# Patient Record
Sex: FEMALE | Race: BLACK OR AFRICAN AMERICAN | NOT HISPANIC OR LATINO | Employment: FULL TIME | URBAN - METROPOLITAN AREA
[De-identification: names, ages, dates, MRNs, and addresses within clinical notes are randomized per-mention and may not be internally consistent; named-entity substitution may affect disease eponyms.]

---

## 2020-01-26 ENCOUNTER — HOSPITAL ENCOUNTER (INPATIENT)
Facility: HOSPITAL | Age: 55
LOS: 11 days | Discharge: HOME | DRG: 442 | End: 2020-02-06
Attending: HOSPITALIST | Admitting: HOSPITALIST
Payer: COMMERCIAL

## 2020-01-26 DIAGNOSIS — D64.9 NORMOCYTIC ANEMIA: ICD-10-CM

## 2020-01-26 DIAGNOSIS — R53.81 DEBILITY: ICD-10-CM

## 2020-01-26 DIAGNOSIS — R07.9 CHEST PAIN: ICD-10-CM

## 2020-01-26 DIAGNOSIS — R55 SYNCOPE AND COLLAPSE: ICD-10-CM

## 2020-01-26 DIAGNOSIS — E88.09 HYPOALBUMINEMIA: Primary | ICD-10-CM

## 2020-01-26 DIAGNOSIS — K72.00 ACUTE HEPATIC FAILURE: ICD-10-CM

## 2020-01-26 DIAGNOSIS — B18.2 CHRONIC HEPATITIS C WITHOUT HEPATIC COMA: ICD-10-CM

## 2020-01-26 DIAGNOSIS — S36.119A HEPATIC TRAUMA, INITIAL ENCOUNTER: ICD-10-CM

## 2020-01-26 PROCEDURE — 20600001 HC STEP DOWN PRIVATE ROOM

## 2020-01-26 RX ORDER — SODIUM CHLORIDE 0.9 % (FLUSH) 0.9 %
10 SYRINGE (ML) INJECTION
Status: DISCONTINUED | OUTPATIENT
Start: 2020-01-27 | End: 2020-02-06 | Stop reason: HOSPADM

## 2020-01-26 NOTE — LETTER
February 6, 2020         [address]  [facility] [address]  Phone: [phone]       Patient: Albania Richter   YOB: 1965  Date of Visit: 02/06/2020    To Whom It May Concern:    Albania Richter  was hospitalized at [facility] from 01/26/2020 to 02/06/2020 for management of acute liver injury. She may return to work on 02/24/2020 no restrictions. If you have any questions or concerns, or if I can be of further assistance, please do not hesitate to contact me.    Sincerely,        Abel Schmid MD

## 2020-01-26 NOTE — LETTER
February 6, 2020         [address]  [facility] [address]  Phone: [phone]       Patient: Albania Richter   YOB: 1965  Date of Visit: 02/06/2020    To Whom It May Concern:    Albania Richter was at [facility] from 01/26/2020 to 02/06/2020. She may return to work/school on 02/24/2020 with no restrictions. If you have any questions or concerns, or if I can be of further assistance, please do not hesitate to contact me.    Sincerely,        Abel Schmid MD

## 2020-01-26 NOTE — PLAN OF CARE
[facility] Transfer Acceptance Note    [place] [facility] [place] [address] -  Please call extension [phone] (if nobody answers, this will flip to a beeper, so put in your call back number) upon patient arrival to floor for Hospital Medicine admit team assignment and for additional admit orders for the patient.  Do not page the attending, staff physician associate with the patient on arrival (may not be in-house at the time of arrival).  Rather, always call [phone] to reach the triage physician for orders and team assignment.     Transferring Facility/Hospital: [facility]     Referring Provider/Specialty giving report: Dr. Lit Cortez    Accepting Physician for admission to hospital: Zelalem Dangelo MD    [place] Dest[place] & [place] [facility] [place] [address] -     Date of acceptance:  1/26/2020   2:49 PM    Patients name: Albania Richter     Allergies:Review of patient's allergies indicates:  Allergies not on file     Reason for transfer:  higher level of care,      Overview/ Report from Physician/Mid-Level Provider:    HPI:  55 Y/O with hx of fatty liver dz (dx 13 yrs ago), presented on 1/23 with abdominal pain and fvers/chills CT scan showed inflamed gallbladder and concerns for acalculous cholecystitis, surgery was consulted, on admit pt was afebrile, borderline Bp (90/60s) nromocardic, normal pulse ox, she had no leukocytosis, normal renal function, labs notable for T. Bili of 4.5 and transaminase values ast/alt of 1662/1831 with alk phos of 247.  Minaya sign was present on examination.     On 1/23 - Dr. Miguelito Honeycutt took aptient for lap corbin and intraoperative cholangiogram, cholangiogram described as entire biliary tree was visualized and there was rapid emptying into the small bowel, We then removed the cholangiogram catheter     Subsequent lab w/u on 1/25 noted pts HCV Ab was positive, blood cultures shown no antwon x 2 days,a nd urine culture with no significant growth,  on 1/24 pt  with some acute dyspnea checst pain, cardiac enzymes negative, pain started after IV acetylcysteine was administered due to hx of increased Tylenol intake prior to arrival (taking 8-500mg Tylenol daily for 4 days prior to admission, apap level 2.4 )  GI recommended acetylcysteine which has completed.     Conference call with Dr. Cortez and Dr. Rowe hepatology  -patient has had HIDA scan that has returned normal, no encephalopathy reported.   Current Inpatient meds are D5-Normal saline infusion, Lovenox Sq,  Protonix, DIlaudid PRN and Zofran PRN     No reported drug use     VS: Temp:  [99.7 °F (37.6 °C)]   Pulse:  [90]   Resp:  [20]   BP: (90)/(46)   SpO2:  [93 %]     Labs:  1/22 - T.bili 4.5   Ast/alt - 1662/1831     1/26     Cbc - 9.5/9.9/209     CMP  - na 142 k 3.6      Diagnostic Tests/Radiographs:   CT scan 1/23  -fatty enlarged liver, 19cm increased echogenicity , alarge amt of pericholecystic edema or fluid, no stones by ultrasound, correlate clinically for cholecystitis,     HIDA scan completed today - reported result as normal.     To Do List upon arrival:  Admit to Stepdown bed     1. Obtain HCV viral load RNA_Autoimmune markers - ANA_Anti-Sm muscle Ab, Anti-LKM, Anti-microsome  Check EBV, CMV, HSV 1-2 serum titers   Check IgG levels   Check Ammonia level  Check Type & Screen - obtain blood consent   Check ABG x 1 _Lactic acid   Check Blood cultures _UA/reflex culture and CXR     2. Trend CBC_PT/INR_CMP_Mg_Phos  Daily to q12hrs   -POCT Blood glucose monitoring     3. Hepatology consult            Zelalem Dangelo M.D.  Attending Physician  [facility] Medicine Dept.  Pager: [phone]

## 2020-01-27 PROBLEM — K76.82 HEPATIC ENCEPHALOPATHY: Status: ACTIVE | Noted: 2020-01-27

## 2020-01-27 PROBLEM — E83.39 HYPOPHOSPHATEMIA: Status: ACTIVE | Noted: 2020-01-27

## 2020-01-27 PROBLEM — E88.09 HYPOALBUMINEMIA: Status: ACTIVE | Noted: 2020-01-27

## 2020-01-27 PROBLEM — Z90.49 S/P LAPAROSCOPIC CHOLECYSTECTOMY: Status: ACTIVE | Noted: 2020-01-27

## 2020-01-27 PROBLEM — S36.119A LIVER INJURY: Status: ACTIVE | Noted: 2020-01-26

## 2020-01-27 PROBLEM — D64.9 NORMOCYTIC ANEMIA: Status: ACTIVE | Noted: 2020-01-27

## 2020-01-27 PROBLEM — B19.20 HEPATITIS C: Status: ACTIVE | Noted: 2020-01-27

## 2020-01-27 LAB
ALBUMIN SERPL BCP-MCNC: 2.5 G/DL (ref 3.5–5.2)
ALBUMIN SERPL BCP-MCNC: 2.7 G/DL (ref 3.5–5.2)
ALLENS TEST: ABNORMAL
ALP SERPL-CCNC: 193 U/L (ref 55–135)
ALP SERPL-CCNC: 200 U/L (ref 55–135)
ALT SERPL W/O P-5'-P-CCNC: 1401 U/L (ref 10–44)
ALT SERPL W/O P-5'-P-CCNC: 1451 U/L (ref 10–44)
AMMONIA PLAS-SCNC: 86 UMOL/L (ref 10–50)
ANION GAP SERPL CALC-SCNC: 13 MMOL/L (ref 8–16)
ANION GAP SERPL CALC-SCNC: 9 MMOL/L (ref 8–16)
APAP SERPL-MCNC: <3 UG/ML (ref 10–20)
APTT BLDCRRT: 26.5 SEC (ref 21–32)
AST SERPL-CCNC: 1437 U/L (ref 10–40)
AST SERPL-CCNC: 1476 U/L (ref 10–40)
BASOPHILS # BLD AUTO: 0.04 K/UL (ref 0–0.2)
BASOPHILS # BLD AUTO: 0.04 K/UL (ref 0–0.2)
BASOPHILS NFR BLD: 0.3 % (ref 0–1.9)
BASOPHILS NFR BLD: 0.4 % (ref 0–1.9)
BILIRUB DIRECT SERPL-MCNC: >14 MG/DL (ref 0.1–0.3)
BILIRUB SERPL-MCNC: 24.5 MG/DL (ref 0.1–1)
BILIRUB SERPL-MCNC: 24.8 MG/DL (ref 0.1–1)
BILIRUB UR QL STRIP: ABNORMAL
BUN SERPL-MCNC: 10 MG/DL (ref 6–20)
BUN SERPL-MCNC: 11 MG/DL (ref 6–20)
CALCIUM SERPL-MCNC: 8.7 MG/DL (ref 8.7–10.5)
CALCIUM SERPL-MCNC: 8.8 MG/DL (ref 8.7–10.5)
CERULOPLASMIN SERPL-MCNC: 39 MG/DL (ref 15–45)
CHLORIDE SERPL-SCNC: 105 MMOL/L (ref 95–110)
CHLORIDE SERPL-SCNC: 106 MMOL/L (ref 95–110)
CLARITY UR REFRACT.AUTO: CLEAR
CO2 SERPL-SCNC: 22 MMOL/L (ref 23–29)
CO2 SERPL-SCNC: 24 MMOL/L (ref 23–29)
COLOR UR AUTO: ABNORMAL
CREAT SERPL-MCNC: 0.8 MG/DL (ref 0.5–1.4)
CREAT SERPL-MCNC: 0.9 MG/DL (ref 0.5–1.4)
DIFFERENTIAL METHOD: ABNORMAL
DIFFERENTIAL METHOD: ABNORMAL
EOSINOPHIL # BLD AUTO: 0.3 K/UL (ref 0–0.5)
EOSINOPHIL # BLD AUTO: 0.3 K/UL (ref 0–0.5)
EOSINOPHIL NFR BLD: 2.4 % (ref 0–8)
EOSINOPHIL NFR BLD: 2.5 % (ref 0–8)
ERYTHROCYTE [DISTWIDTH] IN BLOOD BY AUTOMATED COUNT: 14.6 % (ref 11.5–14.5)
ERYTHROCYTE [DISTWIDTH] IN BLOOD BY AUTOMATED COUNT: 14.6 % (ref 11.5–14.5)
EST. GFR  (AFRICAN AMERICAN): >60 ML/MIN/1.73 M^2
EST. GFR  (AFRICAN AMERICAN): >60 ML/MIN/1.73 M^2
EST. GFR  (NON AFRICAN AMERICAN): >60 ML/MIN/1.73 M^2
EST. GFR  (NON AFRICAN AMERICAN): >60 ML/MIN/1.73 M^2
FERRITIN SERPL-MCNC: ABNORMAL NG/ML (ref 20–300)
GLUCOSE SERPL-MCNC: 100 MG/DL (ref 70–110)
GLUCOSE SERPL-MCNC: 102 MG/DL (ref 70–110)
GLUCOSE UR QL STRIP: NEGATIVE
HAV IGM SERPL QL IA: NEGATIVE
HBV CORE IGM SERPL QL IA: NEGATIVE
HBV SURFACE AG SERPL QL IA: NEGATIVE
HCO3 UR-SCNC: 24 MMOL/L (ref 24–28)
HCT VFR BLD AUTO: 31.9 % (ref 37–48.5)
HCT VFR BLD AUTO: 32.6 % (ref 37–48.5)
HCV AB SERPL QL IA: POSITIVE
HGB BLD-MCNC: 10 G/DL (ref 12–16)
HGB BLD-MCNC: 9.7 G/DL (ref 12–16)
HGB UR QL STRIP: NEGATIVE
HIV 1+2 AB+HIV1 P24 AG SERPL QL IA: NEGATIVE
IGG SERPL-MCNC: 1505 MG/DL (ref 650–1600)
IMM GRANULOCYTES # BLD AUTO: 0.16 K/UL (ref 0–0.04)
IMM GRANULOCYTES # BLD AUTO: 0.2 K/UL (ref 0–0.04)
IMM GRANULOCYTES NFR BLD AUTO: 1.5 % (ref 0–0.5)
IMM GRANULOCYTES NFR BLD AUTO: 1.7 % (ref 0–0.5)
INR PPP: 1.3 (ref 0.8–1.2)
INR PPP: 1.4 (ref 0.8–1.2)
KETONES UR QL STRIP: NEGATIVE
LACTATE SERPL-SCNC: 1.2 MMOL/L (ref 0.5–2.2)
LDH SERPL L TO P-CCNC: 2.31 MMOL/L (ref 0.36–1.25)
LEUKOCYTE ESTERASE UR QL STRIP: NEGATIVE
LYMPHOCYTES # BLD AUTO: 2.5 K/UL (ref 1–4.8)
LYMPHOCYTES # BLD AUTO: 2.7 K/UL (ref 1–4.8)
LYMPHOCYTES NFR BLD: 22.1 % (ref 18–48)
LYMPHOCYTES NFR BLD: 23 % (ref 18–48)
MAGNESIUM SERPL-MCNC: 2 MG/DL (ref 1.6–2.6)
MAGNESIUM SERPL-MCNC: 2 MG/DL (ref 1.6–2.6)
MCH RBC QN AUTO: 29.5 PG (ref 27–31)
MCH RBC QN AUTO: 29.7 PG (ref 27–31)
MCHC RBC AUTO-ENTMCNC: 30.4 G/DL (ref 32–36)
MCHC RBC AUTO-ENTMCNC: 30.7 G/DL (ref 32–36)
MCV RBC AUTO: 97 FL (ref 82–98)
MCV RBC AUTO: 97 FL (ref 82–98)
MONOCYTES # BLD AUTO: 1.3 K/UL (ref 0.3–1)
MONOCYTES # BLD AUTO: 1.4 K/UL (ref 0.3–1)
MONOCYTES NFR BLD: 11.7 % (ref 4–15)
MONOCYTES NFR BLD: 12.5 % (ref 4–15)
NEUTROPHILS # BLD AUTO: 6.4 K/UL (ref 1.8–7.7)
NEUTROPHILS # BLD AUTO: 7.4 K/UL (ref 1.8–7.7)
NEUTROPHILS NFR BLD: 60.1 % (ref 38–73)
NEUTROPHILS NFR BLD: 61.8 % (ref 38–73)
NITRITE UR QL STRIP: NEGATIVE
NRBC BLD-RTO: 0 /100 WBC
NRBC BLD-RTO: 0 /100 WBC
PCO2 BLDA: 31.9 MMHG (ref 35–45)
PH SMN: 7.48 [PH] (ref 7.35–7.45)
PH UR STRIP: 7 [PH] (ref 5–8)
PHOSPHATE SERPL-MCNC: 2 MG/DL (ref 2.7–4.5)
PHOSPHATE SERPL-MCNC: 2.4 MG/DL (ref 2.7–4.5)
PLATELET # BLD AUTO: 221 K/UL (ref 150–350)
PLATELET # BLD AUTO: 224 K/UL (ref 150–350)
PMV BLD AUTO: 13 FL (ref 9.2–12.9)
PMV BLD AUTO: 14 FL (ref 9.2–12.9)
PO2 BLDA: 88 MMHG (ref 80–100)
POC BE: 1 MMOL/L
POC SATURATED O2: 98 % (ref 95–100)
POC TCO2: 25 MMOL/L (ref 23–27)
POCT GLUCOSE: 138 MG/DL (ref 70–110)
POTASSIUM SERPL-SCNC: 3.5 MMOL/L (ref 3.5–5.1)
POTASSIUM SERPL-SCNC: 3.6 MMOL/L (ref 3.5–5.1)
PROT SERPL-MCNC: 6.2 G/DL (ref 6–8.4)
PROT SERPL-MCNC: 6.3 G/DL (ref 6–8.4)
PROT UR QL STRIP: NEGATIVE
PROTHROMBIN TIME: 13 SEC (ref 9–12.5)
PROTHROMBIN TIME: 13.4 SEC (ref 9–12.5)
RBC # BLD AUTO: 3.29 M/UL (ref 4–5.4)
RBC # BLD AUTO: 3.37 M/UL (ref 4–5.4)
SAMPLE: ABNORMAL
SITE: ABNORMAL
SODIUM SERPL-SCNC: 139 MMOL/L (ref 136–145)
SODIUM SERPL-SCNC: 140 MMOL/L (ref 136–145)
SP GR UR STRIP: 1.01 (ref 1–1.03)
URN SPEC COLLECT METH UR: ABNORMAL
WBC # BLD AUTO: 10.63 K/UL (ref 3.9–12.7)
WBC # BLD AUTO: 11.97 K/UL (ref 3.9–12.7)

## 2020-01-27 PROCEDURE — 85610 PROTHROMBIN TIME: CPT

## 2020-01-27 PROCEDURE — 87040 BLOOD CULTURE FOR BACTERIA: CPT

## 2020-01-27 PROCEDURE — 99223 1ST HOSP IP/OBS HIGH 75: CPT | Mod: ,,, | Performed by: HOSPITALIST

## 2020-01-27 PROCEDURE — 81003 URINALYSIS AUTO W/O SCOPE: CPT

## 2020-01-27 PROCEDURE — 87529 HSV DNA AMP PROBE: CPT | Mod: 91

## 2020-01-27 PROCEDURE — 86038 ANTINUCLEAR ANTIBODIES: CPT

## 2020-01-27 PROCEDURE — 87522 HEPATITIS C REVRS TRNSCRPJ: CPT

## 2020-01-27 PROCEDURE — 82784 ASSAY IGA/IGD/IGG/IGM EACH: CPT

## 2020-01-27 PROCEDURE — 84100 ASSAY OF PHOSPHORUS: CPT

## 2020-01-27 PROCEDURE — 80329 ANALGESICS NON-OPIOID 1 OR 2: CPT

## 2020-01-27 PROCEDURE — 80053 COMPREHEN METABOLIC PANEL: CPT

## 2020-01-27 PROCEDURE — 86706 HEP B SURFACE ANTIBODY: CPT

## 2020-01-27 PROCEDURE — 82140 ASSAY OF AMMONIA: CPT

## 2020-01-27 PROCEDURE — 36600 WITHDRAWAL OF ARTERIAL BLOOD: CPT

## 2020-01-27 PROCEDURE — 82248 BILIRUBIN DIRECT: CPT

## 2020-01-27 PROCEDURE — 20600001 HC STEP DOWN PRIVATE ROOM

## 2020-01-27 PROCEDURE — 36415 COLL VENOUS BLD VENIPUNCTURE: CPT

## 2020-01-27 PROCEDURE — 86235 NUCLEAR ANTIGEN ANTIBODY: CPT

## 2020-01-27 PROCEDURE — 83735 ASSAY OF MAGNESIUM: CPT

## 2020-01-27 PROCEDURE — 80321 ALCOHOLS BIOMARKERS 1OR 2: CPT

## 2020-01-27 PROCEDURE — 83605 ASSAY OF LACTIC ACID: CPT

## 2020-01-27 PROCEDURE — 99223 PR INITIAL HOSPITAL CARE,LEVL III: ICD-10-PCS | Mod: ,,, | Performed by: INTERNAL MEDICINE

## 2020-01-27 PROCEDURE — 86703 HIV-1/HIV-2 1 RESULT ANTBDY: CPT

## 2020-01-27 PROCEDURE — 83735 ASSAY OF MAGNESIUM: CPT | Mod: 91

## 2020-01-27 PROCEDURE — 82728 ASSAY OF FERRITIN: CPT

## 2020-01-27 PROCEDURE — 87799 DETECT AGENT NOS DNA QUANT: CPT

## 2020-01-27 PROCEDURE — 99223 1ST HOSP IP/OBS HIGH 75: CPT | Mod: ,,, | Performed by: INTERNAL MEDICINE

## 2020-01-27 PROCEDURE — 80053 COMPREHEN METABOLIC PANEL: CPT | Mod: 91

## 2020-01-27 PROCEDURE — 93010 EKG 12-LEAD: ICD-10-PCS | Mod: ,,, | Performed by: INTERNAL MEDICINE

## 2020-01-27 PROCEDURE — 85730 THROMBOPLASTIN TIME PARTIAL: CPT

## 2020-01-27 PROCEDURE — 82390 ASSAY OF CERULOPLASMIN: CPT

## 2020-01-27 PROCEDURE — 25000003 PHARM REV CODE 250: Performed by: STUDENT IN AN ORGANIZED HEALTH CARE EDUCATION/TRAINING PROGRAM

## 2020-01-27 PROCEDURE — 63600175 PHARM REV CODE 636 W HCPCS: Performed by: STUDENT IN AN ORGANIZED HEALTH CARE EDUCATION/TRAINING PROGRAM

## 2020-01-27 PROCEDURE — 85610 PROTHROMBIN TIME: CPT | Mod: 91

## 2020-01-27 PROCEDURE — 27000221 HC OXYGEN, UP TO 24 HOURS

## 2020-01-27 PROCEDURE — 86704 HEP B CORE ANTIBODY TOTAL: CPT

## 2020-01-27 PROCEDURE — 93010 ELECTROCARDIOGRAM REPORT: CPT | Mod: ,,, | Performed by: INTERNAL MEDICINE

## 2020-01-27 PROCEDURE — 86376 MICROSOMAL ANTIBODY EACH: CPT

## 2020-01-27 PROCEDURE — 80074 ACUTE HEPATITIS PANEL: CPT

## 2020-01-27 PROCEDURE — 84100 ASSAY OF PHOSPHORUS: CPT | Mod: 91

## 2020-01-27 PROCEDURE — 86256 FLUORESCENT ANTIBODY TITER: CPT | Mod: 91

## 2020-01-27 PROCEDURE — 94761 N-INVAS EAR/PLS OXIMETRY MLT: CPT

## 2020-01-27 PROCEDURE — 99900035 HC TECH TIME PER 15 MIN (STAT)

## 2020-01-27 PROCEDURE — 82803 BLOOD GASES ANY COMBINATION: CPT

## 2020-01-27 PROCEDURE — 85025 COMPLETE CBC W/AUTO DIFF WBC: CPT

## 2020-01-27 PROCEDURE — 86803 HEPATITIS C AB TEST: CPT

## 2020-01-27 PROCEDURE — 99223 PR INITIAL HOSPITAL CARE,LEVL III: ICD-10-PCS | Mod: ,,, | Performed by: HOSPITALIST

## 2020-01-27 PROCEDURE — 93005 ELECTROCARDIOGRAM TRACING: CPT

## 2020-01-27 RX ORDER — IBUPROFEN 600 MG/1
600 TABLET ORAL EVERY 8 HOURS PRN
Status: ON HOLD | COMMUNITY
End: 2020-01-30 | Stop reason: HOSPADM

## 2020-01-27 RX ORDER — IBUPROFEN 200 MG
400 TABLET ORAL EVERY 6 HOURS PRN
Status: DISCONTINUED | OUTPATIENT
Start: 2020-01-27 | End: 2020-01-27

## 2020-01-27 RX ORDER — HYDROMORPHONE HYDROCHLORIDE 1 MG/ML
0.2 INJECTION, SOLUTION INTRAMUSCULAR; INTRAVENOUS; SUBCUTANEOUS ONCE
Status: COMPLETED | OUTPATIENT
Start: 2020-01-27 | End: 2020-01-27

## 2020-01-27 RX ORDER — KETOROLAC TROMETHAMINE 15 MG/ML
15 INJECTION, SOLUTION INTRAMUSCULAR; INTRAVENOUS ONCE
Status: DISCONTINUED | OUTPATIENT
Start: 2020-01-27 | End: 2020-01-27

## 2020-01-27 RX ORDER — ENOXAPARIN SODIUM 100 MG/ML
40 INJECTION SUBCUTANEOUS EVERY 24 HOURS
Status: DISCONTINUED | OUTPATIENT
Start: 2020-01-27 | End: 2020-02-06 | Stop reason: HOSPADM

## 2020-01-27 RX ORDER — SODIUM CHLORIDE, SODIUM LACTATE, POTASSIUM CHLORIDE, CALCIUM CHLORIDE 600; 310; 30; 20 MG/100ML; MG/100ML; MG/100ML; MG/100ML
INJECTION, SOLUTION INTRAVENOUS CONTINUOUS
Status: ACTIVE | OUTPATIENT
Start: 2020-01-27 | End: 2020-01-28

## 2020-01-27 RX ORDER — ACETAMINOPHEN 500 MG
500 TABLET ORAL EVERY 6 HOURS PRN
Status: ON HOLD | COMMUNITY
End: 2020-01-30 | Stop reason: HOSPADM

## 2020-01-27 RX ORDER — ONDANSETRON 4 MG/1
4 TABLET, ORALLY DISINTEGRATING ORAL EVERY 6 HOURS PRN
Status: DISCONTINUED | OUTPATIENT
Start: 2020-01-27 | End: 2020-01-29

## 2020-01-27 RX ORDER — SODIUM CHLORIDE, SODIUM LACTATE, POTASSIUM CHLORIDE, CALCIUM CHLORIDE 600; 310; 30; 20 MG/100ML; MG/100ML; MG/100ML; MG/100ML
INJECTION, SOLUTION INTRAVENOUS CONTINUOUS
Status: ACTIVE | OUTPATIENT
Start: 2020-01-27 | End: 2020-01-27

## 2020-01-27 RX ORDER — KETOROLAC TROMETHAMINE 10 MG/1
10 TABLET, FILM COATED ORAL EVERY 6 HOURS PRN
Status: DISPENSED | OUTPATIENT
Start: 2020-01-27 | End: 2020-01-30

## 2020-01-27 RX ORDER — LACTULOSE 10 G/15ML
20 SOLUTION ORAL 3 TIMES DAILY
Status: DISCONTINUED | OUTPATIENT
Start: 2020-01-27 | End: 2020-01-29

## 2020-01-27 RX ORDER — HYDROMORPHONE HYDROCHLORIDE 1 MG/ML
0.2 INJECTION, SOLUTION INTRAMUSCULAR; INTRAVENOUS; SUBCUTANEOUS ONCE
Status: DISCONTINUED | OUTPATIENT
Start: 2020-01-27 | End: 2020-01-27

## 2020-01-27 RX ADMIN — ONDANSETRON 4 MG: 4 TABLET, ORALLY DISINTEGRATING ORAL at 03:01

## 2020-01-27 RX ADMIN — SODIUM CHLORIDE, SODIUM LACTATE, POTASSIUM CHLORIDE, AND CALCIUM CHLORIDE: .6; .31; .03; .02 INJECTION, SOLUTION INTRAVENOUS at 06:01

## 2020-01-27 RX ADMIN — LACTULOSE 20 G: 20 SOLUTION ORAL at 11:01

## 2020-01-27 RX ADMIN — ACETYLCYSTEINE 6.25 MG/KG/HR: 200 INJECTION, SOLUTION INTRAVENOUS at 09:01

## 2020-01-27 RX ADMIN — KETOROLAC TROMETHAMINE 10 MG: 10 TABLET, FILM COATED ORAL at 08:01

## 2020-01-27 RX ADMIN — ENOXAPARIN SODIUM 40 MG: 100 INJECTION SUBCUTANEOUS at 04:01

## 2020-01-27 RX ADMIN — LACTULOSE 20 G: 20 SOLUTION ORAL at 04:01

## 2020-01-27 RX ADMIN — SODIUM CHLORIDE, SODIUM LACTATE, POTASSIUM CHLORIDE, AND CALCIUM CHLORIDE: .6; .31; .03; .02 INJECTION, SOLUTION INTRAVENOUS at 05:01

## 2020-01-27 RX ADMIN — CEFTRIAXONE 2 G: 2 INJECTION, SOLUTION INTRAVENOUS at 01:01

## 2020-01-27 RX ADMIN — PHYTONADIONE 10 MG: 10 INJECTION, EMULSION INTRAMUSCULAR; INTRAVENOUS; SUBCUTANEOUS at 11:01

## 2020-01-27 RX ADMIN — ACETYLCYSTEINE 6.25 MG/KG/HR: 200 INJECTION, SOLUTION INTRAVENOUS at 01:01

## 2020-01-27 RX ADMIN — HYDROMORPHONE HYDROCHLORIDE 0.2 MG: 1 INJECTION, SOLUTION INTRAMUSCULAR; INTRAVENOUS; SUBCUTANEOUS at 01:01

## 2020-01-27 NOTE — PLAN OF CARE
"VSS. A/Ox4.  C/o abdominal discomfort and "some nausea".  PRN zofran given with full relief, patient sleeping upon one hour follow up.  Acetadote infusing at 25 mL/hr due to pump not having a continuous infusion option, charge nurse made aware and also attempted to fix.  Patient lethargic and with delayed responses.  Family at bedside.  Patient has been NPO for liver U/S that is scheduled for first thing in the AM.  No acute events overnight. Safety maintained.  All questions answered. Patient and family updated on plan of care.  Will continue to monitor.    "

## 2020-01-27 NOTE — PHARMACY MED REC
"Admission Medication Reconciliation - Pharmacy Consult Note    The home medication history was taken by Patrizia Dougherty.  Based on information gathered and subsequent review by the clinical pharmacist, the items below may need attention.    You may go to "Admission" then "Reconcile Home Medications" tabs to review and/or act upon these items.        No issues noted with the medication reconciliation.        Please address this information as you see fit.  Feel free to contact us if you have any questions or require assistance.    Berto Matias, PharmD  [id number]                .    .          "

## 2020-01-27 NOTE — ASSESSMENT & PLAN NOTE
Ms Richter is a 54 year old female with history of ?fatty liver disease, who is presenting from OSH due to concern for acute liver injury.    Unclear etiology of patient's acute liver injury.  Not encephalopathic.  No evidence of underlying advanced liver disease though unclear if history of underlying fatty liver. Given preceding symptoms abdominal pain, nausea vomiting diarrhea concern for while illness however initial viral workup at outside hospital was negative with exception of hep C antibody positive (pending viral titer though this is unlikely acute viral hep C).  Differential otherwise includes infectious, drug, vascular, ischemic, autoimmune.  Not currently hypotensive though on admission to outside hospital was noted to be hypotensive with systolic in 90s.  No personal history of autoimmune disorder and IgG is within limits.  Right upper quadrant negative for vascular injury.    Plan  - continue NAC gtt.  - repeat viral studies, CMV EBV HSV pending  - autoimmune serologies pending  - denies history of EtOH use and would not be c/w current presentation.  PETH pending  - mental status currently alert and oriented.  Monitor closely.  No narcotics or sedatives.  No lactulose or rifaximin. CT head for acute changes in mentation  - if initial workup is negative will plan for biopsy tomorrow  - HCV positive.  PCR pending.  - coagulopathy.  Trial of vitamin K 10 mg IV x3 days  - blood cultures, infectious workup, empiric ceftriaxone  - Trend CMP, CBC, INR b.i.d.  - call fellow with any change in clinical status  - transplant:  Will follow clinically and determine if evaluation is indicated

## 2020-01-27 NOTE — ASSESSMENT & PLAN NOTE
Found to be Hep C at OSH. Denies illicit drug use.    Plan:  -will obtain Hep C RNA  -Hepatology consulted

## 2020-01-27 NOTE — H&P
[facility] Hospital Medicine  History & Physical    Patient Name: Albania Richter  MRN: [id number]  Admission Date: 1/26/2020  Attending Physician: Zheng Thompson MD   Primary Care Provider: Primary [facility] Medicine Team: [facility] 4 Jaquelin Dunn MD     Patient information was obtained from patient and ER records.     Subjective:     Principal Problem:Acute hepatic failure    Chief Complaint:   Chief Complaint   Patient presents with    Abdominal Pain    Fatigue        HPI: Albania Richter is a 54 year old female with history of fatty liver disease who presents from [facility] for acute liver injury and hepatology evaluation.    She initially presented to the OSH after 1 week of abdominal pain and increased fatigue. She denies fever or chills, nausea or vomiting. She denies confusion. Her only medications ibuprofen and extra strength tylenol (x 2 q4hrs) which she states she only took occasionally for headache. She denies smoking, alcohol, and illicit drug use. She does not have a family history of liver disease or autoimmune disease.     At the OSH, she was BP 90/60's and afebrile on presentation. She had no leukocytosis. Labs were significant for Tbili 4.5, AST/ALT 1662/1831, and alk phos 247. CT scan showed inflamed gallbladder and concerns for acalculous cholecystitis. Surgery was consulted and performed Lap cholecystomy the following day. Intraoperative cholangiogram was negative. HIDA later found to be negative. Subsequent labs on 1/25 were significant for HCV Ab positive. On 1/24, she was started on IV acetylcysteine at the recommendation of GI due to a history of increased Tylenol intake prior to arrival (taking 8-500mg Tylenol daily for 4 days prior to admission, apap level 2.4) which was noted to be complete per transfer note.  .    [place] [facility] [place] further Hepatology evaluation.    Past Medical History:   Diagnosis Date    Fatty  liver disease, nonalcoholic        Past Surgical History:   Procedure Laterality Date     x3         Review of patient's allergies indicates:  No Known Allergies    No current facility-administered medications on file prior to encounter.      Current Outpatient Medications on File Prior to Encounter   Medication Sig    acetaminophen (TYLENOL EXTRA STRENGTH) 500 MG tablet Take 500 mg by mouth every 6 (six) hours as needed for Pain.    ibuprofen (ADVIL,MOTRIN) 600 MG tablet Take 600 mg by mouth every 8 (eight) hours as needed for Pain.     Family History     None        Tobacco Use    Smoking status: Not on file   Substance and Sexual Activity    Alcohol use: Not on file    Drug use: Not on file    Sexual activity: Not on file     Review of Systems   Constitutional: Negative for chills, fatigue and fever.   HENT: Negative for hearing loss, sore throat and trouble swallowing.    Eyes: Negative for visual disturbance.   Respiratory: Negative for cough and shortness of breath.    Cardiovascular: Negative for chest pain.   Gastrointestinal: Positive for abdominal distention. Negative for abdominal pain, constipation, diarrhea, nausea and vomiting.   Genitourinary: Negative for dysuria and frequency.   Musculoskeletal: Negative for arthralgias and myalgias.   Skin: Negative for rash.   Neurological: Negative for dizziness, weakness and headaches.     Objective:     Vital Signs (Most Recent):  Temp: 99.5 °F (37.5 °C) (20)  Pulse: 83 (20)  Resp: 18 (20)  BP: (!) 96/54 (20)  SpO2: 98 % (20) Vital Signs (24h Range):  Temp:  [99.5 °F (37.5 °C)-100 °F (37.8 °C)] 99.5 °F (37.5 °C)  Pulse:  [83-91] 83  Resp:  [18-20] 18  SpO2:  [93 %-98 %] 98 %  BP: (90-99)/(46-54) 96/54     Weight: 95.7 kg (210 lb 15.7 oz)  Body mass index is 34.05 kg/m².    Physical Exam   Constitutional: No distress.   Tired appearing    HENT:   Head: Normocephalic and atraumatic.   Right  Ear: External ear normal.   Left Ear: External ear normal.   Nose: Nose normal.   Eyes: Right eye exhibits no discharge. Left eye exhibits no discharge. Scleral icterus is present.   Neck: Neck supple.   Cardiovascular: Normal rate, regular rhythm and normal heart sounds. Exam reveals no gallop and no friction rub.   No murmur heard.  Pulmonary/Chest: Effort normal and breath sounds normal. No stridor. No respiratory distress. She has no wheezes. She has no rales.   Abdominal: Soft. Bowel sounds are normal. She exhibits no distension. There is tenderness. There is no rebound and no guarding.   Diffuse tenderness  Healing surgical scar without erythema or drainage   Musculoskeletal: She exhibits no edema.   Neurological: She is alert.   Oriented x 3, no astericks on exam   Skin: Skin is warm and dry. She is not diaphoretic.   Psychiatric: She has a normal mood and affect. Her behavior is normal. Thought content normal.   Nursing note and vitals reviewed.          Significant Labs:   CBC:   Recent Labs   Lab 01/27/20  0013   WBC 10.63   HGB 10.0*   HCT 32.6*        CMP:   Recent Labs   Lab 01/27/20  0013      K 3.5      CO2 24      BUN 10   CREATININE 0.9   CALCIUM 8.7   PROT 6.3   ALBUMIN 2.7*   ALKPHOS 200*   AST 1,476*   ALT 1,451*   ANIONGAP 9   EGFRNONAA >60.0         Assessment/Plan:     * Acute Liver Injury  55 yo Female with history of fatty liver disease presents with acute liver injury at OSH. Denies alcohol and IV drug use. Reported recent extra strength tylenol use prior to admission and found to be Hep C positive on labs. Received NAC at OSH. DDX include acute injury from Hepatitis C vs  DILI from tylenol vs autoimmune.    Plan:  -will obtain acute hep panel, Hep C RNA, ferritin, ABDI, Anti-smooth muscle, ceruloplasmin, CMV PCR, EBV PCR, HSV PCR, HIV, ammonia, IgG  -will continue NAC for now   -Hepatology consulted, appreciate recs  -blood cultures pending    Hepatitis C  Found to  be Hep C at OSH. Denies illicit drug use.    Plan:  -will obtain Hep C RNA  -Hepatology consulted      S/P laparoscopic cholecystectomy  S/p laparoscopic cholecystomy at OSH on 1/24     VTE Risk Mitigation (From admission, onward)         Ordered     IP VTE HIGH RISK PATIENT  Once      01/26/20 2351     Reason for No Pharmacological VTE Prophylaxis  Once     Question:  Reasons:  Answer:  Risk of Bleeding    01/26/20 2351                   Jaquelin Dunn MD  Department of Hospital Medicine   [facility]

## 2020-01-27 NOTE — CODE/ RAPID DOCUMENTATION
"RAPID RESPONSE NURSE NOTE     Admit Date: 2020  LOS: 1  Code Status: Full Code   Date of Consult: 2020  : 1965  Age: 54 y.o.  Weight:   Wt Readings from Last 1 Encounters:   20 95.7 kg (210 lb 15.7 oz)     Sex: female  Race: Black or    Bed: 8083/80 A:   MRN: [id number]  Time Rapid Response Team page Received: 1157  Time Rapid Response Team at Bedside: 1159  Time Rapid Response Team left Bedside: 1225  Was the patient discharged from an ICU this admission?   no  Was the patient discharged from a PACU within last 24 hours?  no  Did the patient receive conscious sedation/general anesthesia within last 24 hours?  no  Was the patient in the ED within the past 24 hours?  no  Was the patient started on NIPPV within the past 24 hours?  no  Did this progress into an ARC or CPA:  no  Attending Physician: Zheng Thompson MD  Primary Service: [facility] 4  Consult Requested By: Zheng Thompson MD     SITUATION     Reason for Call: syncopal episode  Called to evaluate the patient for Neuro    BACKGROUND     Why is the patient in the hospital?: Liver injury    Patient has a past medical history of Fatty liver disease, nonalcoholic, Hepatitis C, and S/P laparoscopic cholecystectomy.    ASSESSMENT/INTERVENTIONS     /70   Pulse 84   Temp 98.5 °F (36.9 °C) (Axillary)   Resp 18   Ht 5' 6" (1.676 m)   Wt 95.7 kg (210 lb 15.7 oz)   SpO2 96%   Breastfeeding? No   BMI 34.05 kg/m²     What did you find: Per bedside RN, pt was in bathroom, vomited, and experienced a syncopal episode. Upon return to bed, pt back to baseline. VSS. Pt sitting in bed lethargic, oriented x 4, and following commands. No respiratory distress noted. EKG completed. Primary team notified by bedside RN of syncopal episode.       RECOMMENDATIONS     We recommend: Fall precautions, closely monitor vital signs and neurological exam.     FOLLOW-UP/CONTINGENCY PLAN       Call the Rapid Response Nurse, " Michelle Briones RN at x [phone] for additional questions or concerns.    PHYSICIAN ESCALATION       Disposition: Remain in room 8083.

## 2020-01-27 NOTE — PLAN OF CARE
01/27/20 1431   Discharge Assessment   Confirmed/corrected address and phone number on facesheet? Yes   Assessment information obtained from? Patient;Caregiver  (Son)   Expected Length of Stay (days) 3   Communicated expected length of stay with patient/caregiver yes   Prior to hospitilization cognitive status: Alert/Oriented   Prior to hospitalization functional status: Independent   Current cognitive status: Alert/Oriented   Current Functional Status: Independent   Lives With parent(s)   Able to Return to Prior Arrangements yes   Is patient able to care for self after discharge? Unable to determine at this time (comments)   Who are your caregiver(s) and their phone number(s)? Pelon Kearns son [phone]   Patient's perception of discharge disposition home or selfcare   Readmission Within the Last 30 Days no previous admission in last 30 days   Patient currently being followed by outpatient case management? Unable to determine (comments)   Patient currently receives any other outside agency services? No   Equipment Currently Used at Home none   Do you have any problems affording any of your prescribed medications? No   Is the patient taking medications as prescribed? yes   Does the patient have transportation home? Yes   Transportation Anticipated family or friend will provide   Dialysis Name and Scheduled days n/a   Does the patient receive services at the Coumadin Clinic? No   Discharge Plan A Home with family   Discharge Plan B Home Health;Home with family   DME Needed Upon Discharge  none

## 2020-01-27 NOTE — HPI
Ms Richter is a 54 year old female with history of ?fatty liver disease, who is presenting from OSH due to concern for acute liver injury.    History obtained from discussion with patient, daughter and chart review.    Her daughter reports that she was well at baseline until approximately 2 weeks ago when she began to develop abdominal pain (diffuse, non-radiating), fatigue, weakness, nausea, vomitus and diarrhea (non-bloody).  Due to worsening symptoms she presented outside hospital for evaluation.  On presentation on 01/22 labs are notable for bilirubin 4.5, AST 1.6k, ALT 1.8k, , no INR available.  Abdominal imaging was concerning for acute cholecystitis with gallbladder wall thickening and pericholecystic fluid therefore underwent lap corbin on 01/23 with negative IOC.  Despite cholecystectomy her labs continue to worsen including a bilirubin which prateek from 4.6 on 01/23 to 10.201/25.  LFTs fluctuating initially improving to AST 1.0 K, ALT 1.3k on 1/25. INR was 1.3 on 1/25.  GI workup included viral hep panel which was notable for positive hep C antibody.  Due worsening liver function was started on NAC and transferred for further evaluation.  Verbally had HIDA scan outside hospital which was negative (report not available).  On admission labs are notable for INR 1.4, T bili 24.8, AST 1.4 K, ALT 1.4 K, ammonia 86.    For history patient reports that she is feeling well currently with exception of fatigue. She reports she she has some persistent abdominal pain predominantly right upper quadrant.  Denies nausea or vomiting. Feels that her symptoms did not improve after cholecystectomy at all.  Denies other history except as above.    She denies any prior history of liver disease.  Denies any family history of liver disease. Denies any knowledge of hep C contraction or prior history of jaundice or hepatitis.    Social History  - Herbal / OTC / Online medications: denies  - APAP: 8 extra-strength tab/day x 4  days prior to admission  - ETOH: none  - Prior ETOH-related hospitalizations: n/a  - Incarcerations/Law issues due to ETOH: n/a  - Drug use: denies  - Smoking: denies  - Occupation: works as  and sitter at [employer]  - Live with: , daughter and grand-daughter  - Animals: none  - Functional status: fully autonomous prior to admission    Past Surgical History  - , cholecystectomy    Family History  - no family history of liver disease

## 2020-01-27 NOTE — SUBJECTIVE & OBJECTIVE
Review of Systems   Constitutional: Positive for fatigue. Negative for activity change, appetite change, chills, diaphoresis, fever and unexpected weight change.   HENT: Negative for sore throat and trouble swallowing.    Eyes: Negative for visual disturbance.   Respiratory: Negative for chest tightness and shortness of breath.    Cardiovascular: Negative for chest pain and leg swelling.   Gastrointestinal: Negative for abdominal distention, abdominal pain, anal bleeding, blood in stool, constipation, diarrhea, nausea and vomiting.   Genitourinary: Negative for dysuria and hematuria.   Musculoskeletal: Negative for arthralgias and myalgias.   Skin: Negative for rash.   Neurological: Negative for dizziness, weakness, light-headedness and headaches.   Psychiatric/Behavioral: Negative for agitation and confusion.       Past Medical History:   Diagnosis Date    Fatty liver disease, nonalcoholic     Hepatitis C 2020    S/P laparoscopic cholecystectomy 2020       Past Surgical History:   Procedure Laterality Date     x3         Family history of liver disease: No    Review of patient's allergies indicates:  No Known Allergies    Tobacco Use    Smoking status: Never Smoker    Smokeless tobacco: Never Used   Substance and Sexual Activity    Alcohol use: Not on file    Drug use: Not on file    Sexual activity: Not on file       Medications Prior to Admission   Medication Sig Dispense Refill Last Dose    acetaminophen (TYLENOL EXTRA STRENGTH) 500 MG tablet Take 500 mg by mouth every 6 (six) hours as needed for Pain.       ibuprofen (ADVIL,MOTRIN) 600 MG tablet Take 600 mg by mouth every 8 (eight) hours as needed for Pain.          Objective:     Vital Signs (Most Recent):  Temp: 98.5 °F (36.9 °C) (20 0719)  Pulse: 84 (20 1218)  Resp: 18 (20 1218)  BP: 128/70 (20 1201)  SpO2: 96 % (20 1218) Vital Signs (24h Range):  Temp:  [98.3 °F (36.8 °C)-100 °F (37.8 °C)] 98.5 °F  (36.9 °C)  Pulse:  [81-97] 84  Resp:  [18-20] 18  SpO2:  [91 %-98 %] 96 %  BP: ()/(53-70) 128/70     Weight: 95.7 kg (210 lb 15.7 oz) (01/26/20 2300)  Body mass index is 34.05 kg/m².    Physical Exam   Constitutional: She is oriented to person, place, and time. She appears well-developed and well-nourished. No distress.   Appears tired but awake alert and answering questions appropriately.  No confusion.  Multiple family members at bedside.   HENT:   Head: Normocephalic and atraumatic.   Mouth/Throat: Oropharynx is clear and moist. No oropharyngeal exudate.   Eyes: Conjunctivae are normal. Scleral icterus is present.   Cardiovascular: Normal rate, regular rhythm, normal heart sounds and intact distal pulses.   Pulmonary/Chest: Effort normal and breath sounds normal. No respiratory distress.   Abdominal: Soft. Bowel sounds are normal. She exhibits no distension and no mass. There is no tenderness. There is no rebound and no guarding.   Soft, mild right upper quadrant tenderness.  Healing labp incisions.   Musculoskeletal: She exhibits edema. She exhibits no tenderness.   Lymphadenopathy:     She has no cervical adenopathy.   Neurological: She is alert and oriented to person, place, and time.   No asterixis.   Skin: Skin is warm. Capillary refill takes less than 2 seconds. No rash noted. She is not diaphoretic.   Psychiatric: She has a normal mood and affect. Her behavior is normal. Judgment and thought content normal.   Nursing note and vitals reviewed.      MELD-Na score: 22 at 1/27/2020  3:09 AM  MELD score: 22 at 1/27/2020  3:09 AM  Calculated from:  Serum Creatinine: 0.8 mg/dL (Rounded to 1 mg/dL) at 1/27/2020  3:09 AM  Serum Sodium: 140 mmol/L (Rounded to 137 mmol/L) at 1/27/2020  3:09 AM  Total Bilirubin: 24.8 mg/dL at 1/27/2020  3:09 AM  INR(ratio): 1.4 at 1/27/2020  3:09 AM  Age: 54 years    Significant Labs:  Labs within the past month have been reviewed.    Significant Imaging:  Labs: Reviewed

## 2020-01-27 NOTE — SUBJECTIVE & OBJECTIVE
Past Medical History:   Diagnosis Date    Fatty liver disease, nonalcoholic        Past Surgical History:   Procedure Laterality Date     x3         Review of patient's allergies indicates:  No Known Allergies    No current facility-administered medications on file prior to encounter.      Current Outpatient Medications on File Prior to Encounter   Medication Sig    acetaminophen (TYLENOL EXTRA STRENGTH) 500 MG tablet Take 500 mg by mouth every 6 (six) hours as needed for Pain.    ibuprofen (ADVIL,MOTRIN) 600 MG tablet Take 600 mg by mouth every 8 (eight) hours as needed for Pain.     Family History     None        Tobacco Use    Smoking status: Not on file   Substance and Sexual Activity    Alcohol use: Not on file    Drug use: Not on file    Sexual activity: Not on file     Review of Systems   Constitutional: Negative for chills, fatigue and fever.   HENT: Negative for hearing loss, sore throat and trouble swallowing.    Eyes: Negative for visual disturbance.   Respiratory: Negative for cough and shortness of breath.    Cardiovascular: Negative for chest pain.   Gastrointestinal: Positive for abdominal distention. Negative for abdominal pain, constipation, diarrhea, nausea and vomiting.   Genitourinary: Negative for dysuria and frequency.   Musculoskeletal: Negative for arthralgias and myalgias.   Skin: Negative for rash.   Neurological: Negative for dizziness, weakness and headaches.     Objective:     Vital Signs (Most Recent):  Temp: 99.5 °F (37.5 °C) (20)  Pulse: 83 (20)  Resp: 18 (20)  BP: (!) 96/54 (20)  SpO2: 98 % (20) Vital Signs (24h Range):  Temp:  [99.5 °F (37.5 °C)-100 °F (37.8 °C)] 99.5 °F (37.5 °C)  Pulse:  [83-91] 83  Resp:  [18-20] 18  SpO2:  [93 %-98 %] 98 %  BP: (90-99)/(46-54) 96/54     Weight: 95.7 kg (210 lb 15.7 oz)  Body mass index is 34.05 kg/m².    Physical Exam   Constitutional: No distress.   Tired appearing    HENT:    Head: Normocephalic and atraumatic.   Right Ear: External ear normal.   Left Ear: External ear normal.   Nose: Nose normal.   Eyes: Right eye exhibits no discharge. Left eye exhibits no discharge. Scleral icterus is present.   Neck: Neck supple.   Cardiovascular: Normal rate, regular rhythm and normal heart sounds. Exam reveals no gallop and no friction rub.   No murmur heard.  Pulmonary/Chest: Effort normal and breath sounds normal. No stridor. No respiratory distress. She has no wheezes. She has no rales.   Abdominal: Soft. Bowel sounds are normal. She exhibits no distension. There is tenderness. There is no rebound and no guarding.   Diffuse tenderness  Healing surgical scar without erythema or drainage   Musculoskeletal: She exhibits no edema.   Neurological: She is alert.   Oriented x 3, no astericks on exam   Skin: Skin is warm and dry. She is not diaphoretic.   Psychiatric: She has a normal mood and affect. Her behavior is normal. Thought content normal.   Nursing note and vitals reviewed.          Significant Labs:   CBC:   Recent Labs   Lab 01/27/20  0013   WBC 10.63   HGB 10.0*   HCT 32.6*        CMP:   Recent Labs   Lab 01/27/20  0013      K 3.5      CO2 24      BUN 10   CREATININE 0.9   CALCIUM 8.7   PROT 6.3   ALBUMIN 2.7*   ALKPHOS 200*   AST 1,476*   ALT 1,451*   ANIONGAP 9   EGFRNONAA >60.0

## 2020-01-27 NOTE — HPI
Albania Richter is a 54 year old female with history of fatty liver disease who presents from [facility] for acute liver injury and hepatology evaluation.    She initially presented to the OSH after 1 week of abdominal pain and increased fatigue. She denies fever or chills, nausea or vomiting. She denies confusion. Her only medications ibuprofen and extra strength tylenol (x 2 q4hrs) which she states she only took occasionally for headache. She denies smoking, alcohol, and illicit drug use. She does not have a family history of liver disease or autoimmune disease.     At the OSH, she was BP 90/60's and afebrile on presentation. She had no leukocytosis. Labs were significant for Tbili 4.5, AST/ALT 1662/1831, and alk phos 247. CT scan showed inflamed gallbladder and concerns for acalculous cholecystitis. Surgery was consulted and performed Lap cholecystomy the following day. Intraoperative cholangiogram was negative. HIDA later found to be negative. Subsequent labs on 1/25 were significant for HCV Ab positive. On 1/24, she was started on IV acetylcysteine at the recommendation of GI due to a history of increased Tylenol intake prior to arrival (taking 8-500mg Tylenol daily for 4 days prior to admission, apap level 2.4) which was noted to be complete per transfer note.  .    Transfer to [facility] was requested for further Hepatology evaluation.

## 2020-01-27 NOTE — ASSESSMENT & PLAN NOTE
53 yo Female with history of fatty liver disease presents with acute liver injury at OSH. Denies alcohol and IV drug use. Reported recent extra strength tylenol use prior to admission and found to be Hep C positive on labs. Received NAC at OSH. DDX include acute injury from Hepatitis C vs  DILI from tylenol vs autoimmune.    Plan:  -will obtain acute hep panel, Hep C RNA, ferritin, ABDI, Anti-smooth muscle, ceruloplasmin, CMV PCR, EBV PCR, HSV PCR, HIV, ammonia, IgG  -will continue NAC for now   -Hepatology consulted, appreciate recs  -blood cultures pending

## 2020-01-27 NOTE — CONSULTS
[facility]  Hepatology  Consult Note    Patient Name: Albania Richter  MRN: [id number]  Admission Date: 1/26/2020  Hospital Length of Stay: 1 days  Attending Provider: Zheng Thompson MD   Primary Care Physician: Primary Doctor No  Principal Problem:Liver injury    Inpatient consult to Hepatology  Consult performed by: Elan Hernandez MD  Consult ordered by: Jaquelin Dunn MD        Subjective:     Transplant status: No    HPI:  Ms Richter is a 54 year old female with history of ?fatty liver disease, who is presenting from [facility] due to concern for acute liver injury.    History obtained from discussion with patient, daughter and chart review.    Her daughter reports that she was well at baseline until approximately 2 weeks ago when she began to develop abdominal pain (diffuse, non-radiating), fatigue, weakness, nausea, vomitus and diarrhea (non-bloody).  Due to worsening symptoms she presented outside hospital for evaluation.  On presentation on 01/22 labs are notable for bilirubin 4.5, AST 1.6k, ALT 1.8k, , no INR available.  Abdominal imaging was concerning for acute cholecystitis with gallbladder wall thickening and pericholecystic fluid therefore underwent lap corbin on 01/23 with negative IOC.  Despite cholecystectomy her labs continue to worsen including a bilirubin which prateek from 4.6 on 01/23 to 10.201/25.  LFTs fluctuating initially improving to AST 1.0 K, ALT 1.3k on 1/25. INR was 1.3 on 1/25.  GI workup included viral hep panel which was notable for positive hep C antibody.  Due worsening liver function was started on NAC and transferred for further evaluation.  Verbally had HIDA scan outside hospital which was negative (report not available).  On admission labs are notable for INR 1.4, T bili 24.8, AST 1.4 K, ALT 1.4 K, ammonia 86.    For history patient reports that she is feeling well currently with exception of fatigue. She reports she she has some persistent abdominal  pain predominantly right upper quadrant.  Denies nausea or vomiting. Feels that her symptoms did not improve after cholecystectomy at all.  Denies other history except as above.    She denies any prior history of liver disease.  Denies any family history of liver disease. Denies any knowledge of hep C contraction or prior history of jaundice or hepatitis.    Social History  - Herbal / OTC / Online medications: denies  - APAP: 8 extra-strength tab/day x 4 days prior to admission  - ETOH: none  - Prior ETOH-related hospitalizations: n/a  - Incarcerations/Law issues due to ETOH: n/a  - Drug use: denies  - Smoking: denies  - Occupation: works as  and sitter at [employer]  - Live with: , daughter and grand-daughter  - Animals: none  - Functional status: fully autonomous prior to admission    Past Surgical History  - , cholecystectomy    Family History  - no family history of liver disease          Review of Systems   Constitutional: Positive for fatigue. Negative for activity change, appetite change, chills, diaphoresis, fever and unexpected weight change.   HENT: Negative for sore throat and trouble swallowing.    Eyes: Negative for visual disturbance.   Respiratory: Negative for chest tightness and shortness of breath.    Cardiovascular: Negative for chest pain and leg swelling.   Gastrointestinal: Negative for abdominal distention, abdominal pain, anal bleeding, blood in stool, constipation, diarrhea, nausea and vomiting.   Genitourinary: Negative for dysuria and hematuria.   Musculoskeletal: Negative for arthralgias and myalgias.   Skin: Negative for rash.   Neurological: Negative for dizziness, weakness, light-headedness and headaches.   Psychiatric/Behavioral: Negative for agitation and confusion.       Past Medical History:   Diagnosis Date    Fatty liver disease, nonalcoholic     Hepatitis C 2020    S/P laparoscopic cholecystectomy 2020       Past Surgical History:    Procedure Laterality Date     x3         Family history of liver disease: No    Review of patient's allergies indicates:  No Known Allergies    Tobacco Use    Smoking status: Never Smoker    Smokeless tobacco: Never Used   Substance and Sexual Activity    Alcohol use: Not on file    Drug use: Not on file    Sexual activity: Not on file       Medications Prior to Admission   Medication Sig Dispense Refill Last Dose    acetaminophen (TYLENOL EXTRA STRENGTH) 500 MG tablet Take 500 mg by mouth every 6 (six) hours as needed for Pain.       ibuprofen (ADVIL,MOTRIN) 600 MG tablet Take 600 mg by mouth every 8 (eight) hours as needed for Pain.          Objective:     Vital Signs (Most Recent):  Temp: 98.5 °F (36.9 °C) (20 0719)  Pulse: 84 (20 1218)  Resp: 18 (20 1218)  BP: 128/70 (20 1201)  SpO2: 96 % (20 1218) Vital Signs (24h Range):  Temp:  [98.3 °F (36.8 °C)-100 °F (37.8 °C)] 98.5 °F (36.9 °C)  Pulse:  [81-97] 84  Resp:  [18-20] 18  SpO2:  [91 %-98 %] 96 %  BP: ()/(53-70) 128/70     Weight: 95.7 kg (210 lb 15.7 oz) (20 2300)  Body mass index is 34.05 kg/m².    Physical Exam   Constitutional: She is oriented to person, place, and time. She appears well-developed and well-nourished. No distress.   Appears tired but awake alert and answering questions appropriately.  No confusion.  Multiple family members at bedside.   HENT:   Head: Normocephalic and atraumatic.   Mouth/Throat: Oropharynx is clear and moist. No oropharyngeal exudate.   Eyes: Conjunctivae are normal. Scleral icterus is present.   Cardiovascular: Normal rate, regular rhythm, normal heart sounds and intact distal pulses.   Pulmonary/Chest: Effort normal and breath sounds normal. No respiratory distress.   Abdominal: Soft. Bowel sounds are normal. She exhibits no distension and no mass. There is no tenderness. There is no rebound and no guarding.   Soft, mild right upper quadrant tenderness.  Healing  labp incisions.   Musculoskeletal: She exhibits edema. She exhibits no tenderness.   Lymphadenopathy:     She has no cervical adenopathy.   Neurological: She is alert and oriented to person, place, and time.   No asterixis.   Skin: Skin is warm. Capillary refill takes less than 2 seconds. No rash noted. She is not diaphoretic.   Psychiatric: She has a normal mood and affect. Her behavior is normal. Judgment and thought content normal.   Nursing note and vitals reviewed.      MELD-Na score: 22 at 1/27/2020  3:09 AM  MELD score: 22 at 1/27/2020  3:09 AM  Calculated from:  Serum Creatinine: 0.8 mg/dL (Rounded to 1 mg/dL) at 1/27/2020  3:09 AM  Serum Sodium: 140 mmol/L (Rounded to 137 mmol/L) at 1/27/2020  3:09 AM  Total Bilirubin: 24.8 mg/dL at 1/27/2020  3:09 AM  INR(ratio): 1.4 at 1/27/2020  3:09 AM  Age: 54 years    Significant Labs:  Labs within the past month have been reviewed.    Significant Imaging:  Labs: Reviewed    Assessment/Plan:     * Acute Liver Injury  Ms Richter is a 54 year old female with history of ?fatty liver disease, who is presenting from OSH due to concern for acute liver injury.    Unclear etiology of patient's acute liver injury.  Not encephalopathic.  No evidence of underlying advanced liver disease though unclear if history of underlying fatty liver. Given preceding symptoms abdominal pain, nausea vomiting diarrhea concern for while illness however initial viral workup at outside hospital was negative with exception of hep C antibody positive (pending viral titer though this is unlikely acute viral hep C).  Differential otherwise includes infectious, drug, vascular, ischemic, autoimmune.  Not currently hypotensive though on admission to outside hospital was noted to be hypotensive with systolic in 90s.  No personal history of autoimmune disorder and IgG is within limits.  Right upper quadrant negative for vascular injury.    Plan  - continue NAC gtt.  - repeat viral studies, CMV EBV HSV  pending  - autoimmune serologies pending  - denies history of EtOH use and would not be c/w current presentation.  PETH pending  - mental status currently alert and oriented.  Monitor closely.  No narcotics or sedatives.  No lactulose or rifaximin. CT head for acute changes in mentation  - if initial workup is negative will plan for biopsy tomorrow  - HCV positive.  PCR pending.  - coagulopathy.  Trial of vitamin K 10 mg IV x3 days  - blood cultures, infectious workup, empiric ceftriaxone  - Trend CMP, CBC, INR b.i.d.  - call fellow with any change in clinical status  - transplant:  Will follow clinically and determine if evaluation is indicated          Thank you for your consult. I will follow-up with patient. Please contact us if you have any additional questions.    Elan Hernandez MD  Hepatology  [facility]

## 2020-01-27 NOTE — CARE UPDATE
Ms. Albania Richter is being admitted to hospital medicine for management of acute liver injury of undetermined etiology.     Hospital course complicated by reported syncopal episode this morning following episode of emesis without associated neurological deficits. EKG and ABG unremarkable. Glucose was normal.     On bedside interview, this morning she reported continued severe right upper quadrant abdominal cramping that was radiating throughout the abdomen, associated with fatigue and generalized fatigue.     Exam is notable for jaundice, scleral icterus, abdominal distension, and tenderness in the right upper quadrant. She is AAO X3.     Plan:   -One dose of Vitamin K ordered.   -Lactulose scheduled TID for hyperammonemia.   -Initiate Ceftriaxone for SBP coverage.   -Tylenol level non-detectable on admission, but will continue NAC as level unclear prior to transfer.   -Follow-up RUQ ultrasound.   -Follow-up remainder of work-up thus far, per hepatology recommendations; appreciate assistance.   -NPO at midnight for possible liver biopsy on 01/28.   -Pain control with Ketorolac PRN, and will attempt to avoid narcotics as do not want to increase risk of encephalopathy.   -Avoid all Tylenol based products.

## 2020-01-27 NOTE — SIGNIFICANT EVENT
Rapid Response Respiratory Therapy Note      Code Status: Full Code   : 1965  Age: 54 y.o.  Weight:   Wt Readings from Last 1 Encounters:   20 95.7 kg (210 lb 15.7 oz)     Sex: female  Race: Black or    Bed: 8083/8083 A:   MRN: [id number]  Time page Received: 1155  Time Rapid Response RT at Bedside: 1156  Time Rapid Response RT left Bedside: 1220  Report given to:     SITUATION     Evaluated patient for:     BACKGROUND     Patient has a past medical history of Fatty liver disease, nonalcoholic, Hepatitis C, and S/P laparoscopic cholecystectomy.    ASSESSMENT/INTERVENTIONS     Upon arrival in room pt lethargic.  Had just vomitted whiel using the bathroom.  Placed on 2LNC.  resp status stable    Pulse:    Respiratory rate:    Temperature: Temp: 98.5 °F (36.9 °C) BP: BP: 128/70 SpO2:    Level of Consciousness: Level of Consciousness (AVPU): alert  Respiratory Effort: Respiratory Effort: Normal, Unlabored  Expansion/Accessory Muscle Usage: Expansion/Accessory Muscles/Retractions: expansion symmetric, no retractions, no use of accessory muscles  All Lung Field Breath Sounds:    O2 Device/Concentration:     Most recent blood gas:   Recent Labs     20  0013   LACTATE 1.2     PF Ratio Calculator  P/F Ratio:      Current Respiratory Care Orders:     NIPPV: No Surgical airway: No Vent: No  ETCO2 monitored:    Ambu at bedside:      RECOMMENDATIONS   ?  We recommend:     ESCALATION      Discussed plan of care with     and primary RT,   .     FOLLOW-UP     Disposition: Remain in room 8083.    Please call back the Rapid Response RT, Gillian Barrett, RRT at x [phone] for any questions or concerns.

## 2020-01-28 PROBLEM — E87.6 HYPOKALEMIA: Status: ACTIVE | Noted: 2020-01-28

## 2020-01-28 LAB
ALBUMIN SERPL BCP-MCNC: 2.3 G/DL (ref 3.5–5.2)
ALP SERPL-CCNC: 170 U/L (ref 55–135)
ALT SERPL W/O P-5'-P-CCNC: 1146 U/L (ref 10–44)
ANION GAP SERPL CALC-SCNC: 8 MMOL/L (ref 8–16)
AST SERPL-CCNC: 1081 U/L (ref 10–40)
BASOPHILS # BLD AUTO: 0.04 K/UL (ref 0–0.2)
BASOPHILS NFR BLD: 0.4 % (ref 0–1.9)
BILIRUB SERPL-MCNC: 21.6 MG/DL (ref 0.1–1)
BUN SERPL-MCNC: 10 MG/DL (ref 6–20)
CALCIUM SERPL-MCNC: 8.4 MG/DL (ref 8.7–10.5)
CHLORIDE SERPL-SCNC: 105 MMOL/L (ref 95–110)
CO2 SERPL-SCNC: 25 MMOL/L (ref 23–29)
CREAT SERPL-MCNC: 0.9 MG/DL (ref 0.5–1.4)
DIFFERENTIAL METHOD: ABNORMAL
EOSINOPHIL # BLD AUTO: 0.3 K/UL (ref 0–0.5)
EOSINOPHIL NFR BLD: 3.1 % (ref 0–8)
ERYTHROCYTE [DISTWIDTH] IN BLOOD BY AUTOMATED COUNT: 15 % (ref 11.5–14.5)
EST. GFR  (AFRICAN AMERICAN): >60 ML/MIN/1.73 M^2
EST. GFR  (NON AFRICAN AMERICAN): >60 ML/MIN/1.73 M^2
GLUCOSE SERPL-MCNC: 99 MG/DL (ref 70–110)
HBV CORE AB SERPL QL IA: NEGATIVE
HBV SURFACE AB SER-ACNC: NEGATIVE M[IU]/ML
HCT VFR BLD AUTO: 27.8 % (ref 37–48.5)
HCV AB SERPL QL IA: POSITIVE
HCV RNA SERPL NAA+PROBE-LOG IU: 6.93 LOG (10) IU/ML
HCV RNA SERPL QL NAA+PROBE: DETECTED IU/ML
HCV RNA SPEC NAA+PROBE-ACNC: ABNORMAL IU/ML
HGB BLD-MCNC: 8.6 G/DL (ref 12–16)
HSV-1 DNA BY PCR: NEGATIVE
HSV-1 DNA BY PCR: NEGATIVE
HSV-2 DNA BY PCR: NEGATIVE
HSV-2 DNA BY PCR: NEGATIVE
IMM GRANULOCYTES # BLD AUTO: 0.25 K/UL (ref 0–0.04)
IMM GRANULOCYTES NFR BLD AUTO: 2.4 % (ref 0–0.5)
INR PPP: 1.3 (ref 0.8–1.2)
IRON SERPL-MCNC: 192 UG/DL (ref 30–160)
LKM AB SER-ACNC: 3.1 UNITS
LYMPHOCYTES # BLD AUTO: 2.3 K/UL (ref 1–4.8)
LYMPHOCYTES NFR BLD: 21.6 % (ref 18–48)
MAGNESIUM SERPL-MCNC: 2 MG/DL (ref 1.6–2.6)
MCH RBC QN AUTO: 29.3 PG (ref 27–31)
MCHC RBC AUTO-ENTMCNC: 30.9 G/DL (ref 32–36)
MCV RBC AUTO: 95 FL (ref 82–98)
MITOCHONDRIA AB TITR SER IF: NORMAL {TITER}
MONOCYTES # BLD AUTO: 1.4 K/UL (ref 0.3–1)
MONOCYTES NFR BLD: 13.4 % (ref 4–15)
NEUTROPHILS # BLD AUTO: 6.2 K/UL (ref 1.8–7.7)
NEUTROPHILS NFR BLD: 59.1 % (ref 38–73)
NRBC BLD-RTO: 0 /100 WBC
PHOSPHATE SERPL-MCNC: 2.6 MG/DL (ref 2.7–4.5)
PLATELET # BLD AUTO: 211 K/UL (ref 150–350)
PMV BLD AUTO: 13.3 FL (ref 9.2–12.9)
POTASSIUM SERPL-SCNC: 3.3 MMOL/L (ref 3.5–5.1)
PROT SERPL-MCNC: 5.7 G/DL (ref 6–8.4)
PROTHROMBIN TIME: 12.8 SEC (ref 9–12.5)
RBC # BLD AUTO: 2.94 M/UL (ref 4–5.4)
SATURATED IRON: 87 % (ref 20–50)
SODIUM SERPL-SCNC: 138 MMOL/L (ref 136–145)
TOTAL IRON BINDING CAPACITY: 221 UG/DL (ref 250–450)
TRANSFERRIN SERPL-MCNC: 149 MG/DL (ref 200–375)
WBC # BLD AUTO: 10.51 K/UL (ref 3.9–12.7)

## 2020-01-28 PROCEDURE — 83735 ASSAY OF MAGNESIUM: CPT

## 2020-01-28 PROCEDURE — 94761 N-INVAS EAR/PLS OXIMETRY MLT: CPT

## 2020-01-28 PROCEDURE — 63600175 PHARM REV CODE 636 W HCPCS: Performed by: STUDENT IN AN ORGANIZED HEALTH CARE EDUCATION/TRAINING PROGRAM

## 2020-01-28 PROCEDURE — 99233 PR SUBSEQUENT HOSPITAL CARE,LEVL III: ICD-10-PCS | Mod: ,,, | Performed by: HOSPITALIST

## 2020-01-28 PROCEDURE — 84100 ASSAY OF PHOSPHORUS: CPT

## 2020-01-28 PROCEDURE — 99233 SBSQ HOSP IP/OBS HIGH 50: CPT | Mod: ,,, | Performed by: HOSPITALIST

## 2020-01-28 PROCEDURE — 85025 COMPLETE CBC W/AUTO DIFF WBC: CPT

## 2020-01-28 PROCEDURE — 25000003 PHARM REV CODE 250: Performed by: STUDENT IN AN ORGANIZED HEALTH CARE EDUCATION/TRAINING PROGRAM

## 2020-01-28 PROCEDURE — 83540 ASSAY OF IRON: CPT

## 2020-01-28 PROCEDURE — 25000003 PHARM REV CODE 250: Performed by: HOSPITALIST

## 2020-01-28 PROCEDURE — 99900035 HC TECH TIME PER 15 MIN (STAT)

## 2020-01-28 PROCEDURE — 85610 PROTHROMBIN TIME: CPT

## 2020-01-28 PROCEDURE — 86720 LEPTOSPIRA ANTIBODY: CPT

## 2020-01-28 PROCEDURE — 94799 UNLISTED PULMONARY SVC/PX: CPT

## 2020-01-28 PROCEDURE — 20600001 HC STEP DOWN PRIVATE ROOM

## 2020-01-28 PROCEDURE — 36415 COLL VENOUS BLD VENIPUNCTURE: CPT

## 2020-01-28 PROCEDURE — 80053 COMPREHEN METABOLIC PANEL: CPT

## 2020-01-28 RX ORDER — ONDANSETRON 2 MG/ML
4 INJECTION INTRAMUSCULAR; INTRAVENOUS ONCE
Status: COMPLETED | OUTPATIENT
Start: 2020-01-28 | End: 2020-01-28

## 2020-01-28 RX ORDER — POTASSIUM CHLORIDE 20 MEQ/1
40 TABLET, EXTENDED RELEASE ORAL
Status: COMPLETED | OUTPATIENT
Start: 2020-01-28 | End: 2020-01-28

## 2020-01-28 RX ADMIN — LACTULOSE 20 G: 20 SOLUTION ORAL at 08:01

## 2020-01-28 RX ADMIN — KETOROLAC TROMETHAMINE 10 MG: 10 TABLET, FILM COATED ORAL at 08:01

## 2020-01-28 RX ADMIN — KETOROLAC TROMETHAMINE 10 MG: 10 TABLET, FILM COATED ORAL at 10:01

## 2020-01-28 RX ADMIN — POTASSIUM CHLORIDE 40 MEQ: 1500 TABLET, EXTENDED RELEASE ORAL at 02:01

## 2020-01-28 RX ADMIN — KETOROLAC TROMETHAMINE 10 MG: 10 TABLET, FILM COATED ORAL at 04:01

## 2020-01-28 RX ADMIN — PHYTONADIONE 10 MG: 10 INJECTION, EMULSION INTRAMUSCULAR; INTRAVENOUS; SUBCUTANEOUS at 10:01

## 2020-01-28 RX ADMIN — ONDANSETRON 4 MG: 4 TABLET, ORALLY DISINTEGRATING ORAL at 05:01

## 2020-01-28 RX ADMIN — CEFTRIAXONE 2 G: 2 INJECTION, SOLUTION INTRAVENOUS at 12:01

## 2020-01-28 RX ADMIN — ENOXAPARIN SODIUM 40 MG: 100 INJECTION SUBCUTANEOUS at 06:01

## 2020-01-28 RX ADMIN — POTASSIUM CHLORIDE 40 MEQ: 1500 TABLET, EXTENDED RELEASE ORAL at 10:01

## 2020-01-28 RX ADMIN — ONDANSETRON 4 MG: 4 TABLET, ORALLY DISINTEGRATING ORAL at 10:01

## 2020-01-28 RX ADMIN — ONDANSETRON 4 MG: 2 INJECTION INTRAMUSCULAR; INTRAVENOUS at 06:01

## 2020-01-28 RX ADMIN — ACETYLCYSTEINE 6.25 MG/KG/HR: 200 INJECTION, SOLUTION INTRAVENOUS at 06:01

## 2020-01-28 NOTE — PLAN OF CARE
No significant events or changes overnight, pt slept throughout most of shift except when needing incontinence care or toileting, several family members visiting pt until almost 11pm, attempted to answer questions regarding POC and pt's condition (family did not seem satisfied with answers provided and encouraged to be present when MDs rounded in am), prn med given for pain and nausea, required additional dose of IV nausea med this am with moderate relief noted, call light in reach, will continue to monitor

## 2020-01-28 NOTE — NURSING
Pt AAx3, waxes and wanes, lethargic for most of shift. Call light in reach, bed in lowest position, family at bedside throughout shift. Administered medication per orders, pt tolerated well. Pt reported headache this AM. Reported findings to team, team stated there was nothing that could be administered due to pt current conditions. At 1200 pt family called for help. When RN entered pt was in restroom, unconscious but breathing. RN could not easily rouse pt. Called for rapid response. Pt then had two small episodes of emesis. Able to transport pt back to bed. Rapid response team at bedside. VSS were stable. Refer to orders placed and completed. MD later at bedside to assess. VSS, frequent hourly rounding completed. Educated pt about need to monitor I and O's. No other significant events throughout shift. Will continue to monitor.

## 2020-01-28 NOTE — ASSESSMENT & PLAN NOTE
55 yo Female with history of fatty liver disease presents with acute liver injury at OSH. Denies alcohol and IV drug use. Reported recent extra strength tylenol use prior to admission and found to be Hep C positive on labs. Received NAC at OSH. DDX include acute injury from Hepatitis C vs  DILI from tylenol vs autoimmune.  Workup: ceruloplasmin WNL. Acute hep panel +hepC ab, HIV neg  Ferritin 20k    Plan:  -F/u Hep C RNA, ABDI, Anti-smooth muscle, CMV PCR, EBV PCR, HSV PCR  -Continue NAC for until AST/ALT <1k  -Hepatology following: appreciate assistance

## 2020-01-28 NOTE — SUBJECTIVE & OBJECTIVE
Interval History: NAEON. Pt complains of fatigue and some shortness of breath. Incentive spirometer offered.     Review of Systems   Constitutional: Positive for fatigue. Negative for activity change, appetite change, chills, diaphoresis, fever and unexpected weight change.   HENT: Negative for sore throat and trouble swallowing.    Eyes: Negative for visual disturbance.   Respiratory: Negative for chest tightness and shortness of breath.    Cardiovascular: Negative for chest pain and leg swelling.   Gastrointestinal: Positive for abdominal distention. Negative for abdominal pain, anal bleeding, blood in stool, constipation, diarrhea, nausea and vomiting.   Genitourinary: Negative for dysuria and hematuria.   Musculoskeletal: Negative for arthralgias and myalgias.   Skin: Negative for rash.   Neurological: Negative for dizziness, weakness, light-headedness and headaches.   Psychiatric/Behavioral: Negative for agitation and confusion.     Objective:     Vital Signs (Most Recent):  Temp: 98.5 °F (36.9 °C) (01/28/20 1223)  Pulse: 78 (01/28/20 1511)  Resp: 18 (01/28/20 1223)  BP: 121/67 (01/28/20 1223)  SpO2: 96 % (01/28/20 1223) Vital Signs (24h Range):  Temp:  [97.6 °F (36.4 °C)-98.8 °F (37.1 °C)] 98.5 °F (36.9 °C)  Pulse:  [73-82] 78  Resp:  [16-19] 18  SpO2:  [96 %-100 %] 96 %  BP: ()/(55-74) 121/67     Weight: 95.7 kg (210 lb 15.7 oz)  Body mass index is 34.05 kg/m².    Intake/Output Summary (Last 24 hours) at 1/28/2020 1636  Last data filed at 1/28/2020 0500  Gross per 24 hour   Intake 2573.33 ml   Output 150 ml   Net 2423.33 ml      Physical Exam   Constitutional: She is oriented to person, place, and time. No distress.   HENT:   Head: Normocephalic and atraumatic.   Mouth/Throat: Oropharynx is clear and moist. No oropharyngeal exudate.   Eyes: Conjunctivae are normal. Scleral icterus is present.   Cardiovascular: Normal rate, regular rhythm, normal heart sounds and intact distal pulses.   Pulmonary/Chest:  Effort normal and breath sounds normal. No respiratory distress.   Abdominal: Soft. Bowel sounds are normal. She exhibits no distension. There is no tenderness. There is no guarding.   Healing laproscopic incisions.   Musculoskeletal: She exhibits edema. She exhibits no tenderness.   Lymphadenopathy:     She has no cervical adenopathy.   Neurological: She is alert and oriented to person, place, and time.   No asterixis.   Skin: Skin is warm and dry. She is not diaphoretic.   Jaundiced   Psychiatric: She has a normal mood and affect. Her behavior is normal.   Nursing note and vitals reviewed.    Significant Labs: All pertinent labs within the past 24 hours have been reviewed.    Significant Imaging: I have reviewed and interpreted all pertinent imaging results/findings within the past 24 hours.

## 2020-01-28 NOTE — TREATMENT PLAN
Hepatology Treatment Plan    Albania Richter is a 54 y.o. female admitted to hospital 1/26/2020 (Hospital Day: 3) due to Liver injury.     Interval History  - reports she is feeling well this morning. Endorses some abdominal pain which is persistent  - reports overngiht she was going to the bathroom and felt unwell and light headed (see event note), no LOC or head injury  - labs today with improving LFTs  - no confusion  - viral and AI serologies thusfar negative    Objective  Temp:  [97.6 °F (36.4 °C)-98.8 °F (37.1 °C)] 98.5 °F (36.9 °C) (01/28 1223)  Pulse:  [73-82] 75 (01/28 1223)  BP: ()/(55-74) 121/67 (01/28 1223)  Resp:  [16-19] 18 (01/28 1223)  SpO2:  [96 %-100 %] 96 % (01/28 1223)    General: Alert, Oriented x3, no distress  Neurologic: Asterixis absent  Abdomen: Normoactive bowel sounds. Non-distended. Normal tympany. Soft. Tender to palpation mild tenderness RUQ. No peritoneal signs.    Laboratory    Lab Results   Component Value Date    WBC 10.51 01/28/2020    HGB 8.6 (L) 01/28/2020    HCT 27.8 (L) 01/28/2020    MCV 95 01/28/2020     01/28/2020       Lab Results   Component Value Date     01/28/2020    K 3.3 (L) 01/28/2020     01/28/2020    CO2 25 01/28/2020    BUN 10 01/28/2020    CREATININE 0.9 01/28/2020    CALCIUM 8.4 (L) 01/28/2020       Lab Results   Component Value Date    ALBUMIN 2.3 (L) 01/28/2020    ALT 1,146 (H) 01/28/2020    AST 1,081 (H) 01/28/2020    ALKPHOS 170 (H) 01/28/2020    BILITOT 21.6 (H) 01/28/2020       Lab Results   Component Value Date    INR 1.3 (H) 01/28/2020    INR 1.4 (H) 01/27/2020    INR 1.3 (H) 01/27/2020       MELD-Na score: 21 at 1/28/2020  4:52 AM  MELD score: 21 at 1/28/2020  4:52 AM  Calculated from:  Serum Creatinine: 0.9 mg/dL (Rounded to 1 mg/dL) at 1/28/2020  4:52 AM  Serum Sodium: 138 mmol/L (Rounded to 137 mmol/L) at 1/28/2020  4:52 AM  Total Bilirubin: 21.6 mg/dL at 1/28/2020  4:52 AM  INR(ratio): 1.3 at 1/28/2020  4:52 AM  Age: 54  years    Plan  - acute liver injury, improving. Etiology remains unclear  - will continue supportive care. If not improving/worsening will plan for biopsy  - continue BID labs  - see consult note for additional recommendations  - please obtain daily CBC, BMP, LFT, INR  - Plan of care was discussed with primary team    Thank you for involving us in the care of Albania Richter. Please call with any additional concerns or questions.    Elan Hernandez MD  Gastroenterology Fellow

## 2020-01-28 NOTE — PROGRESS NOTES
[facility] Hospital Medicine  Progress Note    Patient Name: Albania Richter  MRN: [id number]  Patient Class: IP- Inpatient   Admission Date: 1/26/2020  Length of Stay: 2 days  Attending Physician: Zheng Thompson MD  Primary Care Provider: John Acosta Jr, MD    Hospital Medicine Team: [facility] MED 4 Sary Arango MD    Subjective:     Principal Problem:Liver injury        HPI:  Albania Richter is a 54 year old female with history of fatty liver disease who presents from [facility] for acute liver injury and hepatology evaluation.    She initially presented to the OSH after 1 week of abdominal pain and increased fatigue. She denies fever or chills, nausea or vomiting. She denies confusion. Her only medications ibuprofen and extra strength tylenol (x 2 q4hrs) which she states she only took occasionally for headache. She denies smoking, alcohol, and illicit drug use. She does not have a family history of liver disease or autoimmune disease.     At the OSH, she was BP 90/60's and afebrile on presentation. She had no leukocytosis. Labs were significant for Tbili 4.5, AST/ALT 1662/1831, and alk phos 247. CT scan showed inflamed gallbladder and concerns for acalculous cholecystitis. Surgery was consulted and performed Lap cholecystomy the following day. Intraoperative cholangiogram was negative. HIDA later found to be negative. Subsequent labs on 1/25 were significant for HCV Ab positive. On 1/24, she was started on IV acetylcysteine at the recommendation of GI due to a history of increased Tylenol intake prior to arrival (taking 8-500mg Tylenol daily for 4 days prior to admission, apap level 2.4) which was noted to be complete per transfer note.  .    Transfer to [facility] [place] was requested for further Hepatology evaluation.    Overview/Hospital Course:  Ms. Richter presented as a transfer for hepatology evaluation for acute liver injury. Upon admission she was found to have  AST/ALT of 1437 and 1401, T Brennan was 24.8. NAC was continued (reported tylenol) and she was started on lactulose due to complaints of extreme fatigue and an elevated ammonia of 86. Hepatology was consulted, further studies are pending.     Interval History: NAEON. Pt complains of fatigue and some shortness of breath. Incentive spirometer offered.     Review of Systems   Constitutional: Positive for fatigue. Negative for activity change, appetite change, chills, diaphoresis, fever and unexpected weight change.   HENT: Negative for sore throat and trouble swallowing.    Eyes: Negative for visual disturbance.   Respiratory: Negative for chest tightness and shortness of breath.    Cardiovascular: Negative for chest pain and leg swelling.   Gastrointestinal: Positive for abdominal distention. Negative for abdominal pain, anal bleeding, blood in stool, constipation, diarrhea, nausea and vomiting.   Genitourinary: Negative for dysuria and hematuria.   Musculoskeletal: Negative for arthralgias and myalgias.   Skin: Negative for rash.   Neurological: Negative for dizziness, weakness, light-headedness and headaches.   Psychiatric/Behavioral: Negative for agitation and confusion.     Objective:     Vital Signs (Most Recent):  Temp: 98.5 °F (36.9 °C) (01/28/20 1223)  Pulse: 78 (01/28/20 1511)  Resp: 18 (01/28/20 1223)  BP: 121/67 (01/28/20 1223)  SpO2: 96 % (01/28/20 1223) Vital Signs (24h Range):  Temp:  [97.6 °F (36.4 °C)-98.8 °F (37.1 °C)] 98.5 °F (36.9 °C)  Pulse:  [73-82] 78  Resp:  [16-19] 18  SpO2:  [96 %-100 %] 96 %  BP: ()/(55-74) 121/67     Weight: 95.7 kg (210 lb 15.7 oz)  Body mass index is 34.05 kg/m².    Intake/Output Summary (Last 24 hours) at 1/28/2020 1636  Last data filed at 1/28/2020 0500  Gross per 24 hour   Intake 2573.33 ml   Output 150 ml   Net 2423.33 ml      Physical Exam   Constitutional: She is oriented to person, place, and time. No distress.   HENT:   Head: Normocephalic and atraumatic.    Mouth/Throat: Oropharynx is clear and moist. No oropharyngeal exudate.   Eyes: Conjunctivae are normal. Scleral icterus is present.   Cardiovascular: Normal rate, regular rhythm, normal heart sounds and intact distal pulses.   Pulmonary/Chest: Effort normal and breath sounds normal. No respiratory distress.   Abdominal: Soft. Bowel sounds are normal. She exhibits no distension. There is no tenderness. There is no guarding.   Healing laproscopic incisions.   Musculoskeletal: She exhibits edema. She exhibits no tenderness.   Lymphadenopathy:     She has no cervical adenopathy.   Neurological: She is alert and oriented to person, place, and time.   No asterixis.   Skin: Skin is warm and dry. She is not diaphoretic.   Jaundiced   Psychiatric: She has a normal mood and affect. Her behavior is normal.   Nursing note and vitals reviewed.    Significant Labs: All pertinent labs within the past 24 hours have been reviewed.    Significant Imaging: I have reviewed and interpreted all pertinent imaging results/findings within the past 24 hours.      Assessment/Plan:      * Acute Liver Injury  53 yo Female with history of fatty liver disease presents with acute liver injury at OSH. Denies alcohol and IV drug use. Reported recent extra strength tylenol use prior to admission and found to be Hep C positive on labs. Received NAC at OSH. DDX include acute injury from Hepatitis C vs  DILI from tylenol vs autoimmune.  Workup: ceruloplasmin WNL. Acute hep panel +hepC ab, HIV neg  Ferritin 20k    Plan:  -F/u Hep C RNA, ABDI, Anti-smooth muscle, CMV PCR, EBV PCR, HSV PCR  -Continue NAC for until AST/ALT <1k  -Hepatology following: appreciate assistance    S/P laparoscopic cholecystectomy  S/p laparoscopic cholecystomy at OSH on 1/24     Hepatitis C  Found to be Hep C at OSH. Denies illicit drug use.    Plan:  -Hep C RNA: >8mil  -Hepatology consulted      VTE Risk Mitigation (From admission, onward)         Ordered     enoxaparin  injection 40 mg  Daily      01/27/20 0127     IP VTE HIGH RISK PATIENT  Once      01/26/20 2351     Reason for No Pharmacological VTE Prophylaxis  Once     Question:  Reasons:  Answer:  Risk of Bleeding    01/26/20 1501                      Sary Arango MD  Department of Hospital Medicine   [facility]

## 2020-01-28 NOTE — ASSESSMENT & PLAN NOTE
Found to be Hep C at OSH. Denies illicit drug use.    Plan:  -Hep C RNA: >8mil  -Hepatology consulted

## 2020-01-28 NOTE — HOSPITAL COURSE
Ms. Albania Richter was admitted to \[facility]"" medicine on 01/26/20 as a transfer for hepatology evaluation for acute liver injury. Upon admission she was found to have AST/ALT of 1437 and 1401, with hyperbilirubinemia (24.8). Her Tylenol level was negative on admission, however NAC was continued due to reported history of prior Tylenol use. She was also started on Lactulose due to complaints of extreme fatigue and an elevated ammonia of 86 on admission. She was also initiated on Ceftriaxone for possible underlying infectious etiology; discontinued on 01/30 without further evidence of infection. She also received three days of Vitamin K supplementation. Hepatology was consulted on admission and subsequent workup was positive for HepC with moderate HCV viral load (>8 mil), however unclear if representative of acute infection. Subsequent workup was negative for HIV, Hep A and B, HSV and underlying Sumit's disease. Autoimmune workup thus far significant for positive anti-smooth muscle antibodies, however not with significantly high titer. On 01/29, NAC was discontinued as LFTs continued to trend down to less than 1K. On 01/30, she had an ultrasound guided liver biopsy to help with further clarification of underlying etiology; results on 02/04 were suggestive of steatohepatitis with possible advanced fibrosis, however pathologist noted that biopsy was sub-optimal overall and will likely need to be repeated. PT/OT evaluated patient on admission with recommendations for rehab; however were unable to obtain placement to rehab and SNF. Patient's liver enzyme abnormalities and generalized weakness continued to improve and she was discharged home with home health for PT/OT on 02/06. Outpatient follow-up with hepatology scheduled in 04/2020.

## 2020-01-29 LAB
ALBUMIN SERPL BCP-MCNC: 2.3 G/DL (ref 3.5–5.2)
ALP SERPL-CCNC: 171 U/L (ref 55–135)
ALT SERPL W/O P-5'-P-CCNC: 1060 U/L (ref 10–44)
ANA SER QL IF: NORMAL
ANION GAP SERPL CALC-SCNC: 8 MMOL/L (ref 8–16)
AST SERPL-CCNC: 946 U/L (ref 10–40)
BASOPHILS # BLD AUTO: 0.03 K/UL (ref 0–0.2)
BASOPHILS NFR BLD: 0.3 % (ref 0–1.9)
BILIRUB SERPL-MCNC: 17.9 MG/DL (ref 0.1–1)
BUN SERPL-MCNC: 12 MG/DL (ref 6–20)
CALCIUM SERPL-MCNC: 8.3 MG/DL (ref 8.7–10.5)
CHLORIDE SERPL-SCNC: 107 MMOL/L (ref 95–110)
CMV DNA SERPL NAA+PROBE-ACNC: NORMAL IU/ML
CO2 SERPL-SCNC: 22 MMOL/L (ref 23–29)
CREAT SERPL-MCNC: 0.9 MG/DL (ref 0.5–1.4)
DIFFERENTIAL METHOD: ABNORMAL
EBV DNA BY PCR: NORMAL IU/ML
EOSINOPHIL # BLD AUTO: 0.4 K/UL (ref 0–0.5)
EOSINOPHIL NFR BLD: 3.7 % (ref 0–8)
ERYTHROCYTE [DISTWIDTH] IN BLOOD BY AUTOMATED COUNT: 15.8 % (ref 11.5–14.5)
EST. GFR  (AFRICAN AMERICAN): >60 ML/MIN/1.73 M^2
EST. GFR  (NON AFRICAN AMERICAN): >60 ML/MIN/1.73 M^2
GLUCOSE SERPL-MCNC: 143 MG/DL (ref 70–110)
HCT VFR BLD AUTO: 27.6 % (ref 37–48.5)
HGB BLD-MCNC: 8.9 G/DL (ref 12–16)
IMM GRANULOCYTES # BLD AUTO: 0.39 K/UL (ref 0–0.04)
IMM GRANULOCYTES NFR BLD AUTO: 3.9 % (ref 0–0.5)
INR PPP: 1.2 (ref 0.8–1.2)
LYMPHOCYTES # BLD AUTO: 2 K/UL (ref 1–4.8)
LYMPHOCYTES NFR BLD: 20.6 % (ref 18–48)
MAGNESIUM SERPL-MCNC: 2.1 MG/DL (ref 1.6–2.6)
MCH RBC QN AUTO: 30.3 PG (ref 27–31)
MCHC RBC AUTO-ENTMCNC: 32.2 G/DL (ref 32–36)
MCV RBC AUTO: 94 FL (ref 82–98)
MONOCYTES # BLD AUTO: 1.2 K/UL (ref 0.3–1)
MONOCYTES NFR BLD: 11.9 % (ref 4–15)
NEUTROPHILS # BLD AUTO: 5.9 K/UL (ref 1.8–7.7)
NEUTROPHILS NFR BLD: 59.6 % (ref 38–73)
NRBC BLD-RTO: 0 /100 WBC
PHOSPHATE SERPL-MCNC: 2.7 MG/DL (ref 2.7–4.5)
PLATELET # BLD AUTO: 223 K/UL (ref 150–350)
PMV BLD AUTO: 13.5 FL (ref 9.2–12.9)
POTASSIUM SERPL-SCNC: 3.5 MMOL/L (ref 3.5–5.1)
PROT SERPL-MCNC: 5.9 G/DL (ref 6–8.4)
PROTHROMBIN TIME: 12.2 SEC (ref 9–12.5)
RBC # BLD AUTO: 2.94 M/UL (ref 4–5.4)
SODIUM SERPL-SCNC: 137 MMOL/L (ref 136–145)
WBC # BLD AUTO: 9.88 K/UL (ref 3.9–12.7)

## 2020-01-29 PROCEDURE — 63600175 PHARM REV CODE 636 W HCPCS: Performed by: STUDENT IN AN ORGANIZED HEALTH CARE EDUCATION/TRAINING PROGRAM

## 2020-01-29 PROCEDURE — 99232 SBSQ HOSP IP/OBS MODERATE 35: CPT | Mod: ,,, | Performed by: INTERNAL MEDICINE

## 2020-01-29 PROCEDURE — 97161 PT EVAL LOW COMPLEX 20 MIN: CPT

## 2020-01-29 PROCEDURE — 83735 ASSAY OF MAGNESIUM: CPT

## 2020-01-29 PROCEDURE — 99233 SBSQ HOSP IP/OBS HIGH 50: CPT | Mod: ,,, | Performed by: HOSPITALIST

## 2020-01-29 PROCEDURE — 85025 COMPLETE CBC W/AUTO DIFF WBC: CPT

## 2020-01-29 PROCEDURE — 99232 PR SUBSEQUENT HOSPITAL CARE,LEVL II: ICD-10-PCS | Mod: ,,, | Performed by: INTERNAL MEDICINE

## 2020-01-29 PROCEDURE — 84100 ASSAY OF PHOSPHORUS: CPT

## 2020-01-29 PROCEDURE — 80053 COMPREHEN METABOLIC PANEL: CPT

## 2020-01-29 PROCEDURE — 99233 PR SUBSEQUENT HOSPITAL CARE,LEVL III: ICD-10-PCS | Mod: ,,, | Performed by: HOSPITALIST

## 2020-01-29 PROCEDURE — 97165 OT EVAL LOW COMPLEX 30 MIN: CPT

## 2020-01-29 PROCEDURE — 25000003 PHARM REV CODE 250: Performed by: STUDENT IN AN ORGANIZED HEALTH CARE EDUCATION/TRAINING PROGRAM

## 2020-01-29 PROCEDURE — 85610 PROTHROMBIN TIME: CPT

## 2020-01-29 PROCEDURE — 36415 COLL VENOUS BLD VENIPUNCTURE: CPT

## 2020-01-29 PROCEDURE — 20600001 HC STEP DOWN PRIVATE ROOM

## 2020-01-29 RX ORDER — ONDANSETRON 2 MG/ML
4 INJECTION INTRAMUSCULAR; INTRAVENOUS ONCE
Status: COMPLETED | OUTPATIENT
Start: 2020-01-29 | End: 2020-01-29

## 2020-01-29 RX ORDER — ONDANSETRON 8 MG/1
8 TABLET, ORALLY DISINTEGRATING ORAL EVERY 8 HOURS PRN
Status: DISCONTINUED | OUTPATIENT
Start: 2020-01-29 | End: 2020-02-06 | Stop reason: HOSPADM

## 2020-01-29 RX ORDER — HYDROXYZINE HYDROCHLORIDE 25 MG/1
25 TABLET, FILM COATED ORAL ONCE
Status: COMPLETED | OUTPATIENT
Start: 2020-01-29 | End: 2020-01-29

## 2020-01-29 RX ORDER — LACTULOSE 10 G/15ML
20 SOLUTION ORAL 2 TIMES DAILY
Status: DISCONTINUED | OUTPATIENT
Start: 2020-01-29 | End: 2020-01-30

## 2020-01-29 RX ADMIN — PHYTONADIONE 10 MG: 10 INJECTION, EMULSION INTRAMUSCULAR; INTRAVENOUS; SUBCUTANEOUS at 09:01

## 2020-01-29 RX ADMIN — HYDROXYZINE HYDROCHLORIDE 25 MG: 25 TABLET, FILM COATED ORAL at 09:01

## 2020-01-29 RX ADMIN — SODIUM CHLORIDE 500 ML: 0.9 INJECTION, SOLUTION INTRAVENOUS at 05:01

## 2020-01-29 RX ADMIN — ENOXAPARIN SODIUM 40 MG: 100 INJECTION SUBCUTANEOUS at 04:01

## 2020-01-29 RX ADMIN — CEFTRIAXONE 2 G: 2 INJECTION, SOLUTION INTRAVENOUS at 11:01

## 2020-01-29 RX ADMIN — ACETYLCYSTEINE 6.25 MG/KG/HR: 200 INJECTION, SOLUTION INTRAVENOUS at 05:01

## 2020-01-29 RX ADMIN — ONDANSETRON 4 MG: 2 INJECTION INTRAMUSCULAR; INTRAVENOUS at 02:01

## 2020-01-29 RX ADMIN — LACTULOSE 20 G: 20 SOLUTION ORAL at 09:01

## 2020-01-29 RX ADMIN — ONDANSETRON 8 MG: 8 TABLET, ORALLY DISINTEGRATING ORAL at 02:01

## 2020-01-29 NOTE — SUBJECTIVE & OBJECTIVE
Interval History: Pt reports feeling better overall but still has complaints of fatigue and intermittent nausea. LFTs are improving and hepatic autoimmune workup is pending.     Review of Systems   Constitutional: Positive for fatigue. Negative for activity change, appetite change, chills, diaphoresis, fever and unexpected weight change.   HENT: Negative for sore throat and trouble swallowing.    Eyes: Negative for visual disturbance.   Respiratory: Negative for chest tightness and shortness of breath.    Cardiovascular: Negative for chest pain and leg swelling.   Gastrointestinal: Positive for abdominal distention and nausea. Negative for abdominal pain, constipation, diarrhea and vomiting.   Genitourinary: Negative for dysuria and hematuria.   Musculoskeletal: Negative for arthralgias and myalgias.   Skin: Negative for rash.   Neurological: Negative for dizziness, weakness, light-headedness and headaches.   Psychiatric/Behavioral: Negative for agitation and confusion.     Objective:     Vital Signs (Most Recent):  Temp: 97.9 °F (36.6 °C) (01/29/20 0749)  Pulse: 83 (01/29/20 0759)  Resp: 18 (01/29/20 0749)  BP: 115/66 (01/29/20 0749)  SpO2: 95 % (01/29/20 0749) Vital Signs (24h Range):  Temp:  [97.9 °F (36.6 °C)-98.5 °F (36.9 °C)] 97.9 °F (36.6 °C)  Pulse:  [73-88] 83  Resp:  [17-18] 18  SpO2:  [94 %-99 %] 95 %  BP: (102-121)/(57-67) 115/66     Weight: 95.7 kg (210 lb 15.7 oz)  Body mass index is 34.05 kg/m².    Intake/Output Summary (Last 24 hours) at 1/29/2020 0905  Last data filed at 1/29/2020 0745  Gross per 24 hour   Intake 597 ml   Output --   Net 597 ml      Physical Exam   Constitutional: She is oriented to person, place, and time. No distress.   HENT:   Head: Normocephalic and atraumatic.   Mouth/Throat: Oropharynx is clear and moist. No oropharyngeal exudate.   Eyes: Conjunctivae are normal. Scleral icterus is present.   Cardiovascular: Normal rate, regular rhythm, normal heart sounds and intact distal  pulses.   Pulmonary/Chest: Effort normal and breath sounds normal. No respiratory distress.   Abdominal: Soft. Bowel sounds are normal. She exhibits no distension. There is no tenderness. There is no guarding.   Healing laproscopic incisions.   Musculoskeletal: She exhibits edema. She exhibits no tenderness.   Lymphadenopathy:     She has no cervical adenopathy.   Neurological: She is alert and oriented to person, place, and time.   No asterixis.   Skin: Skin is warm and dry. She is not diaphoretic.   Jaundiced   Psychiatric: She has a normal mood and affect. Her behavior is normal.   Nursing note and vitals reviewed.      Significant Labs: All pertinent labs within the past 24 hours have been reviewed.    Significant Imaging: I have reviewed and interpreted all pertinent imaging results/findings within the past 24 hours.

## 2020-01-29 NOTE — PROGRESS NOTES
[facility]  Hepatology  Progress Note    Patient Name: Albania Richter  MRN: [id number]  Admission Date: 1/26/2020  Hospital Length of Stay: 3 days  Attending Provider: Zheng Thompson MD   Primary Care Physician: John Acosta Jr, MD  Principal Problem:Liver injury    Subjective:     Transplant status: No    HPI: Ms Richter is a 54 year old female with history of ?fatty liver disease, who is presenting from OSH due to concern for acute liver injury.    History obtained from discussion with patient, daughter and chart review.    Her daughter reports that she was well at baseline until approximately 2 weeks ago when she began to develop abdominal pain (diffuse, non-radiating), fatigue, weakness, nausea, vomitus and diarrhea (non-bloody).  Due to worsening symptoms she presented outside hospital for evaluation.  On presentation on 01/22 labs are notable for bilirubin 4.5, AST 1.6k, ALT 1.8k, , no INR available.  Abdominal imaging was concerning for acute cholecystitis with gallbladder wall thickening and pericholecystic fluid therefore underwent lap corbin on 01/23 with negative IOC.  Despite cholecystectomy her labs continue to worsen including a bilirubin which prateek from 4.6 on 01/23 to 10.201/25.  LFTs fluctuating initially improving to AST 1.0 K, ALT 1.3k on 1/25. INR was 1.3 on 1/25.  GI workup included viral hep panel which was notable for positive hep C antibody.  Due worsening liver function was started on NAC and transferred for further evaluation.  Verbally had HIDA scan outside hospital which was negative (report not available).  On admission labs are notable for INR 1.4, T bili 24.8, AST 1.4 K, ALT 1.4 K, ammonia 86.    For history patient reports that she is feeling well currently with exception of fatigue. She reports she she has some persistent abdominal pain predominantly right upper quadrant.  Denies nausea or vomiting. Feels that her symptoms did not improve after  cholecystectomy at all.  Denies other history except as above.    She denies any prior history of liver disease.  Denies any family history of liver disease. Denies any knowledge of hep C contraction or prior history of jaundice or hepatitis.    Social History  - Herbal / OTC / Online medications: denies  - APAP: 8 extra-strength tab/day x 4 days prior to admission  - ETOH: none  - Prior ETOH-related hospitalizations: n/a  - Incarcerations/Law issues due to ETOH: n/a  - Drug use: denies  - Smoking: denies  - Occupation: works as  and sitter at [employer]  - Live with: , daughter and grand-daughter  - Animals: none  - Functional status: fully autonomous prior to admission    Past Surgical History  - , cholecystectomy    Family History  - no family history of liver disease          Interval History:   - LFTs continuing to downtrend  - workup thusfar negative for etiology  - HCV +ve, titer 8 million  - ABDI pending  - reports she's feeling well today, abdominal pain improving, no n/v. Poor appetite but improving.    Current Facility-Administered Medications   Medication    acetylcysteine 20% (200 mg/ml) (ACETADOTE) 12,000 mg in dextrose 5 % 500 mL infusion    cefTRIAXone (ROCEPHIN) 2 g in dextrose 5 % 50 mL IVPB    enoxaparin injection 40 mg    ketorolac tablet 10 mg    lactulose 20 gram/30 mL solution Soln 20 g    ondansetron disintegrating tablet 8 mg    pneumoc 13-rosemary conj-dip cr(PF) (PREVNAR 13 (PF)) 0.5 mL    sodium chloride 0.9% flush 10 mL       Objective:     Vital Signs (Most Recent):  Temp: 99 °F (37.2 °C) (20 1203)  Pulse: 75 (20 1203)  Resp: (!) 22 (20 1203)  BP: 108/70 (20 1203)  SpO2: 99 % (20 1203) Vital Signs (24h Range):  Temp:  [97.9 °F (36.6 °C)-99 °F (37.2 °C)] 99 °F (37.2 °C)  Pulse:  [73-88] 75  Resp:  [17-22] 22  SpO2:  [94 %-99 %] 99 %  BP: (102-116)/(57-70) 108/70     Weight: 95.7 kg (210 lb 15.7 oz) (20 2300)  Body mass  index is 34.05 kg/m².    Physical Exam   Constitutional: She is oriented to person, place, and time. No distress.   Lying in bed, appears comfortable, no distress. Daughter at bedside.   HENT:   Head: Normocephalic and atraumatic.   Mouth/Throat: No oropharyngeal exudate.   Eyes: Conjunctivae are normal. No scleral icterus.   Pulmonary/Chest: Effort normal. No respiratory distress.   Abdominal: Soft. Bowel sounds are normal. She exhibits no distension. There is no tenderness. There is no guarding.   Soft, mild tenderness RUQ   Musculoskeletal: She exhibits no edema or tenderness.   Neurological: She is alert and oriented to person, place, and time.   No asterixis, no confusion   Skin: Skin is warm. Capillary refill takes less than 2 seconds. She is not diaphoretic.   Psychiatric: She has a normal mood and affect. Thought content normal.   Nursing note and vitals reviewed.      MELD-Na score: 19 at 1/29/2020  4:42 AM  MELD score: 19 at 1/29/2020  4:42 AM  Calculated from:  Serum Creatinine: 0.9 mg/dL (Rounded to 1 mg/dL) at 1/29/2020  4:42 AM  Serum Sodium: 137 mmol/L at 1/29/2020  4:42 AM  Total Bilirubin: 17.9 mg/dL at 1/29/2020  4:42 AM  INR(ratio): 1.2 at 1/29/2020  4:42 AM  Age: 54 years    Significant Labs:  Labs within the past month have been reviewed.    Significant Imaging:  Labs: Reviewed    Assessment/Plan:     * Acute Liver Injury  Ms Richter is a 54 year old female with history of ?fatty liver disease, who is presenting from OSH due to concern for acute liver injury.    Unclear etiology of patient's acute liver injury.  Not encephalopathic.  LFTs improving. Workup only notable for positive HCV AB (titer 8.5 million). Unclear when the infection occurred as no risk factors, but unlikely this represents acute hepatitis C. Given unclear etiology will plan for hepatic biopsy.    Plan  - continue NAC gtt.  - hepatic biopsy tomorrow  - additional viral and autoimmune serologies pending  - denies history of EtOH  use and would not be c/w current presentation.  PETH pending  - mental status currently alert and oriented.  Monitor closely.  No narcotics or sedatives.  No lactulose or rifaximin. CT head for acute changes in mentation  - HCV positive. 8.5 million titer. Plan for outpatient treatment  - coagulopathy.  Trial of vitamin K 10 mg IV x3 days  - daily CBC, CMP, INR        Thank you for your consult. I will follow-up with patient. Please contact us if you have any additional questions.    Elan Hernandez MD  Hepatology  [facility]

## 2020-01-29 NOTE — PLAN OF CARE
No significant changes or events overnight, vitals stable for pt, continues to have intermittent periods of extreme nausea requiring prn meds, pain med given x2 with moderate relief noted, IV sites remain free of complications, family at bedside for pt's comfort and to assist with ADLs, received bedbath before bedtime last night/family wants pt to shower and pt continues to be lethargic and easily fatigued, call light in reach, bed alarm for safety, will continue to monitor

## 2020-01-29 NOTE — PLAN OF CARE
Problem: Occupational Therapy Goal  Goal: Occupational Therapy Goal  Description  Goals to be met by: 2/12/20    Patient will increase functional independence with ADLs by performing:    UE Dressing with [place].  LE Dressing with Supervision.  Grooming while standing at sink with Supervision.  Toileting from toilet with Supervision for hygiene and clothing management.   Supine to sit with [place].  Toilet transfer to toilet with Supervision.  Upper extremity exercise program per handout, with independence.     Outcome: Ongoing, Progressing   Evaluation completed. Initiate POC.   Kat Arce OT  1/29/2020

## 2020-01-29 NOTE — ASSESSMENT & PLAN NOTE
Ms Richter is a 54 year old female with history of ?fatty liver disease, who is presenting from OSH due to concern for acute liver injury.    Unclear etiology of patient's acute liver injury.  Not encephalopathic.  LFTs improving. Workup only notable for positive HCV AB (titer 8.5 million). Unclear when the infection occurred as no risk factors, but unlikely this represents acute hepatitis C. Given unclear etiology will plan for hepatic biopsy.    Plan  - continue NAC gtt.  - hepatic biopsy tomorrow  - additional viral and autoimmune serologies pending  - denies history of EtOH use and would not be c/w current presentation.  PETH pending  - mental status currently alert and oriented.  Monitor closely.  No narcotics or sedatives.  No lactulose or rifaximin. CT head for acute changes in mentation  - HCV positive. 8.5 million titer. Plan for outpatient treatment  - coagulopathy.  Trial of vitamin K 10 mg IV x3 days  - daily CBC, CMP, INR

## 2020-01-29 NOTE — PT/OT/SLP EVAL
Occupational Therapy   Evaluation    Name: Albania Richter  MRN: [id number]  Admitting Diagnosis:  Liver injury      Recommendations:     Discharge Recommendations: home health OT  Discharge Equipment Recommendations:  (TBD pending progress)  Barriers to discharge:  None    Assessment:     Albania Richter is a 54 y.o. female with a medical diagnosis of Liver injury.  She presents alert and motivated to participate in therapy session. Upon arrival, pt found supine with with daughter at bedside. Performance deficits affecting function: weakness, impaired endurance, impaired functional mobilty, gait instability, impaired self care skills, impaired balance, impaired cardiopulmonary response to activity.  Pt requires increased level of skilled assistance and functional mobility at this time and is a high fall risk. She would benefit from HHOT following d/c to continue to progress towards goals and improve quality of life.     Rehab Prognosis: Good; patient would benefit from acute skilled OT services to address these deficits and reach maximum level of function.       Plan:     Patient to be seen 4 x/week to address the above listed problems via self-care/home management, therapeutic activities, therapeutic exercises, neuromuscular re-education  · Plan of Care Expires: 02/27/20  · Plan of Care Reviewed with: patient, daughter    Subjective     Chief Complaint: Fatigue  Patient/Family Comments/goals: Return to PLOF    Occupational Profile:  Living Environment: Pt lives with daughter in [facility] with no TAYO; bathroom contains tub-shower combo with shower chair   Previous level of function: PTA, pt reports being independent with ADL and functional mobility   Roles and Routines: Home/community dweller, does not drive or perform house hold management ( daughter assist)  Equipment Used at Home:  shower chair  Assistance upon Discharge: Pt will have assistance from daughter     Pain/Comfort:  · Pain Rating 1: 0/10  · Pain Rating  Post-Intervention 1: 0/10    Patients cultural, spiritual, Anglican conflicts given the current situation: no    Objective:     Communicated with: RN prior to session.  Patient found supine with telemetry, peripheral IV, bed alarm upon OT entry to room.    General Precautions: Standard, fall   Orthopedic Precautions:N/A   Braces: N/A     Occupational Performance:    Bed Mobility:    · Patient completed Rolling/Turning to Left with  supervision  · Patient completed Supine to Sit with stand by assistance using side rail    Functional Mobility/Transfers:  · Patient completed Sit <> Stand Transfer with contact guard assistance  with  rolling walker   · Patient completed Bed <> Chair Transfer using Stand Pivot technique with contact guard assistance with rolling walker  · Functional Mobility: Pt completed functional mobility from EOB < bedside chair with CGA and RW for balance and safety. Pt displayed fatigue and increase in WOB following transfer.    Activities of Daily Living:  · Upper Body Dressing: minimum assistance to deirdre gown like jacket while seated EOB  * declined further ADL during evaluation    Cognitive/Visual Perceptual:  Cognitive/Psychosocial Skills:     -       Oriented to: Person, Place and Time   -       Follows Commands/attention:Follows multistep  commands  -       Communication: clear/fluent  -       Safety awareness/insight to disability: intact   -       Mood/Affect/Coping skills/emotional control: Appropriate to situation  Visual/Perceptual:      -Intact ( wears readers)    Physical Exam:  Postural examination/scapula alignment:    -       Rounded shoulders  Skin integrity: Visible skin intact  Edema:  None noted  Dominant hand:    -       RUE  Upper Extremity Range of Motion:     -       Right Upper Extremity: WFL  -       Left Upper Extremity: WFL  Upper Extremity Strength:    -       Right Upper Extremity: WFL 4+/5  -       Left Upper Extremity: WFL  4+/5   Strength:    -       Right  Upper Extremity: WFL  -       Left Upper Extremity: WFL    [facility] 6 Click ADL:  AMPAC Total Score: 20    Treatment & Education:  -Pt edu on OT role/POC-- daughter in room for education  -Importance of OOB activity with staff assistance ( UIC during each meal)  -Safety during functional t/f and mobility   -Communication board updated  - All questions/concerns answered within OT scope of practice  Education:    Patient left up in chair with all lines intact, call button in reach and RN notified    GOALS:   Multidisciplinary Problems     Occupational Therapy Goals        Problem: Occupational Therapy Goal    Goal Priority Disciplines Outcome Interventions   Occupational Therapy Goal     OT, PT/OT Ongoing, Progressing    Description:  Goals to be met by: 20    Patient will increase functional independence with ADLs by performing:    UE Dressing with [place].  LE Dressing with Supervision.  Grooming while standing at sink with Supervision.  Toileting from toilet with Supervision for hygiene and clothing management.   Supine to sit with [place].  Toilet transfer to toilet with Supervision.  Upper extremity exercise program per handout, with independence.                      History:     Past Medical History:   Diagnosis Date    Fatty liver disease, nonalcoholic     Hepatitis C 2020    S/P laparoscopic cholecystectomy 2020       Past Surgical History:   Procedure Laterality Date     x3         Time Tracking:     OT Date of Treatment: 20  OT Start Time: 831  OT Stop Time: 847  OT Total Time (min): 16 min    Billable Minutes:Evaluation 16    Kat Arce OT  2020

## 2020-01-29 NOTE — PLAN OF CARE
Problem: Physical Therapy Goal  Goal: Physical Therapy Goal  Description  Goals to be met by: 2020    Patient will increase functional independence with mobility by performin. Supine to sit with [place]  2. Sit to stand transfer with [place]  3. Gait  x 150 feet with [place] using least restrictive AD  4. Lower extremity exercise program x 20 reps per handout, with independence     Outcome: Ongoing, Progressing    Tracey Ferraro, ISABELLA  2020

## 2020-01-29 NOTE — SUBJECTIVE & OBJECTIVE
Interval History:   - LFTs continuing to downtrend  - workup thusfar negative for etiology  - HCV +ve, titer 8 million  - ABDI pending  - reports she's feeling well today, abdominal pain improving, no n/v. Poor appetite but improving.    Current Facility-Administered Medications   Medication    acetylcysteine 20% (200 mg/ml) (ACETADOTE) 12,000 mg in dextrose 5 % 500 mL infusion    cefTRIAXone (ROCEPHIN) 2 g in dextrose 5 % 50 mL IVPB    enoxaparin injection 40 mg    ketorolac tablet 10 mg    lactulose 20 gram/30 mL solution Soln 20 g    ondansetron disintegrating tablet 8 mg    pneumoc 13-rosemary conj-dip cr(PF) (PREVNAR 13 (PF)) 0.5 mL    sodium chloride 0.9% flush 10 mL       Objective:     Vital Signs (Most Recent):  Temp: 99 °F (37.2 °C) (01/29/20 1203)  Pulse: 75 (01/29/20 1203)  Resp: (!) 22 (01/29/20 1203)  BP: 108/70 (01/29/20 1203)  SpO2: 99 % (01/29/20 1203) Vital Signs (24h Range):  Temp:  [97.9 °F (36.6 °C)-99 °F (37.2 °C)] 99 °F (37.2 °C)  Pulse:  [73-88] 75  Resp:  [17-22] 22  SpO2:  [94 %-99 %] 99 %  BP: (102-116)/(57-70) 108/70     Weight: 95.7 kg (210 lb 15.7 oz) (01/26/20 2300)  Body mass index is 34.05 kg/m².    Physical Exam   Constitutional: She is oriented to person, place, and time. No distress.   Lying in bed, appears comfortable, no distress. Daughter at bedside.   HENT:   Head: Normocephalic and atraumatic.   Mouth/Throat: No oropharyngeal exudate.   Eyes: Conjunctivae are normal. No scleral icterus.   Pulmonary/Chest: Effort normal. No respiratory distress.   Abdominal: Soft. Bowel sounds are normal. She exhibits no distension. There is no tenderness. There is no guarding.   Soft, mild tenderness RUQ   Musculoskeletal: She exhibits no edema or tenderness.   Neurological: She is alert and oriented to person, place, and time.   No asterixis, no confusion   Skin: Skin is warm. Capillary refill takes less than 2 seconds. She is not diaphoretic.   Psychiatric: She has a normal mood and  affect. Thought content normal.   Nursing note and vitals reviewed.      MELD-Na score: 19 at 1/29/2020  4:42 AM  MELD score: 19 at 1/29/2020  4:42 AM  Calculated from:  Serum Creatinine: 0.9 mg/dL (Rounded to 1 mg/dL) at 1/29/2020  4:42 AM  Serum Sodium: 137 mmol/L at 1/29/2020  4:42 AM  Total Bilirubin: 17.9 mg/dL at 1/29/2020  4:42 AM  INR(ratio): 1.2 at 1/29/2020  4:42 AM  Age: 54 years    Significant Labs:  Labs within the past month have been reviewed.    Significant Imaging:  Labs: Reviewed

## 2020-01-29 NOTE — PT/OT/SLP EVAL
Physical Therapy Evaluation    Patient Name:  Albania Richter  MRN: [id number]    Recommendations:     Discharge Recommendations: Home Health PT  Equipment recommendations: rolling walker (pending pt progress)  Barriers to discharge: Fall risk     Assessment:     Albania Richter is a 54 y.o. female admitted to [facility] on 2020 with medical diagnosis of Liver injury. Pt presents with weakness, impaired balance, decreased endurance, impaired cardiopulmonary response to activity, decreased safety awareness, altered gait mechanics, impaired functional mobility  and gait instability. Albania Richter would benefit from acute PT intervention to address listed functional deficits, provide patient/caregiver education, reduce fall risk, and maximize (I) and safety with functional mobility. Once medically stable, recommending pt discharge home, with home health PT.     Rehab Prognosis: Good; patient would benefit from acute skilled PT services to address these deficits and reach maximum level of function.      Plan:     During this hospitalization, patient to be seen 4 x/week to address the identified rehab impairments via gait training, therapeutic activities, therapeutic exercises, neuromuscular re-education and progress towards stated goals.     Plan of Care Expires:  20  Plan of Care reviewed with: patient, daughter    This plan of care has been discussed with the patient/caregiver, who was included in its development and is in agreement with the identified goals and treatment plan.     Subjective     Communicated with RN prior to session.  Patient agreeable to participate. Pt's daughter present at bedside upon PT entrance into room.    Patient comments/goals: Pt wants to be able to go back to work.     Pain/Comfort:  · Location: n/a  · Pain Ratin/10   · Pain Rating Post-Intervention: 0/10     Patients cultural, spiritual, Advent conflicts given the current situation: No cultural, spiritual, or educational needs  identified.    Patient History: information obtained from patient      Living Environment: Pt lives with daughter in [facility] with no TAYO.   Prior Level of Function: independent with mobility and ADLs  DME owned: none  Caregiver Assistance: Daughter takes classes, but is able to help her mother with all activities when available.     Objective:     Patient found supine with: telemetry, peripheral IV    Recent Surgery: * No surgery found *    General Precautions: Standard, fall   Orthopedic Precautions:N/A   Braces: N/A   Body mass index is 34.05 kg/m².  Oxygen Device: room air      Exams:     Cognition:  o Pt is slightly letheragic but oriented x 4  o Pt was able to follow commands throughout evaluation      Edema: slight BLE edema     Lower Extremity Range of Motion:  o Right Lower Extremity: WFL  o Left Lower Extremity: WFL     Lower Extremity Strength:  o Right Lower Extremity: WFL  o Left Lower Extremity: WFL    Functional Mobility:    Bed Mobility:  · Supine > Sit: Stand-by Assistance  · Sit > Supine: not assessed pt was left up in chair     Transfers:   · Sit <> Stand Transfer: Contact Guard Assistance EOB with RW              Gait:  · Distance: ~10 steps from EOB to chair  · Assistance level: Contact Guard Assistance  · Assistive Device: rolling walker  · Gait Assessment: decreased step length , decreased gait speed and unsteady gait      Balance:  · Static Sit: Stand-By Assist at EOB    Outcome Measure: AM-PAC 6 CLICK MOBILITY  Total Score:17     Patient/Caregiver Education:     Therapist educated pt/caregiver regarding:    PT POC and goals for therapy    Safety with mobility and fall risk    Instruction on use of call button and importance of calling nursing staff for assistance with mobility     Patient/caregiver able to verbalize understanding; will follow-up with pt/caregiver during current admit for additional questions/concerns within scope of practice.     White board updated.     Patient left up in  chair with all lines intact, call button in reach, chair alarm on, RN notified and Daughter present.    GOALS:   Multidisciplinary Problems     Physical Therapy Goals        Problem: Physical Therapy Goal    Goal Priority Disciplines Outcome Goal Variances Interventions   Physical Therapy Goal     PT, PT/OT Ongoing, Progressing     Description:  Goals to be met by: 2020    Patient will increase functional independence with mobility by performin. Supine to sit with [place]  2. Sit to stand transfer with [place]  3. Gait  x 150 feet with [place] using least restrictive AD  4. Lower extremity exercise program x 20 reps per handout, with independence                        History:     Past Medical History:   Diagnosis Date    Fatty liver disease, nonalcoholic     Hepatitis C 2020    S/P laparoscopic cholecystectomy 2020       Past Surgical History:   Procedure Laterality Date     x3         Time Tracking:     PT Received On: 20  PT Start Time: 831     PT Stop Time: 0850  PT Total Time (min): 19 min     Billable Minutes: Evaluation 19 minutes    ISABELLA Castro  2020

## 2020-01-29 NOTE — PLAN OF CARE
Problem: Adult Inpatient Plan of Care  Goal: Plan of Care Review  Outcome: Ongoing, Progressing    POC reviewed with Pt and family. VSS. A&Ox4. No acute changes. Pt complains some nausea, PRN medication given with some relief. Pt also complains of some anxiety related to her sickness and condition, MD aware. Safety checks preformed. Call light in reach. All questions answered. Will continue to monitor.

## 2020-01-29 NOTE — PLAN OF CARE
Pt AAOx4, VSS, pain 9/10 at this time. Pt reports pain to right side of back. Medicine team informed. No new orders at this time. Pt weak and lathargic most of the day. Family at bedside. Will continue to monitor.

## 2020-01-30 PROBLEM — B18.2 CHRONIC HEPATITIS C WITHOUT HEPATIC COMA: Status: ACTIVE | Noted: 2020-01-27

## 2020-01-30 LAB
ALBUMIN SERPL BCP-MCNC: 2.1 G/DL (ref 3.5–5.2)
ALP SERPL-CCNC: 152 U/L (ref 55–135)
ALT SERPL W/O P-5'-P-CCNC: 972 U/L (ref 10–44)
ANION GAP SERPL CALC-SCNC: 6 MMOL/L (ref 8–16)
AST SERPL-CCNC: 891 U/L (ref 10–40)
BASOPHILS # BLD AUTO: 0.03 K/UL (ref 0–0.2)
BASOPHILS NFR BLD: 0.3 % (ref 0–1.9)
BILIRUB SERPL-MCNC: 14.1 MG/DL (ref 0.1–1)
BUN SERPL-MCNC: 9 MG/DL (ref 6–20)
CALCIUM SERPL-MCNC: 8.2 MG/DL (ref 8.7–10.5)
CHLORIDE SERPL-SCNC: 107 MMOL/L (ref 95–110)
CO2 SERPL-SCNC: 25 MMOL/L (ref 23–29)
CREAT SERPL-MCNC: 1 MG/DL (ref 0.5–1.4)
DIFFERENTIAL METHOD: ABNORMAL
EOSINOPHIL # BLD AUTO: 0.6 K/UL (ref 0–0.5)
EOSINOPHIL NFR BLD: 6.2 % (ref 0–8)
ERYTHROCYTE [DISTWIDTH] IN BLOOD BY AUTOMATED COUNT: 16.5 % (ref 11.5–14.5)
EST. GFR  (AFRICAN AMERICAN): >60 ML/MIN/1.73 M^2
EST. GFR  (NON AFRICAN AMERICAN): >60 ML/MIN/1.73 M^2
GLUCOSE SERPL-MCNC: 81 MG/DL (ref 70–110)
HCT VFR BLD AUTO: 26 % (ref 37–48.5)
HGB BLD-MCNC: 8.5 G/DL (ref 12–16)
IMM GRANULOCYTES # BLD AUTO: 0.4 K/UL (ref 0–0.04)
IMM GRANULOCYTES NFR BLD AUTO: 4.2 % (ref 0–0.5)
INR PPP: 1.2 (ref 0.8–1.2)
LEPTOSPIRA AB SER QL: NEGATIVE
LYMPHOCYTES # BLD AUTO: 2.3 K/UL (ref 1–4.8)
LYMPHOCYTES NFR BLD: 24 % (ref 18–48)
MAGNESIUM SERPL-MCNC: 1.9 MG/DL (ref 1.6–2.6)
MCH RBC QN AUTO: 30.8 PG (ref 27–31)
MCHC RBC AUTO-ENTMCNC: 32.7 G/DL (ref 32–36)
MCV RBC AUTO: 94 FL (ref 82–98)
MONOCYTES # BLD AUTO: 1.3 K/UL (ref 0.3–1)
MONOCYTES NFR BLD: 13.3 % (ref 4–15)
NEUTROPHILS # BLD AUTO: 5 K/UL (ref 1.8–7.7)
NEUTROPHILS NFR BLD: 52 % (ref 38–73)
NRBC BLD-RTO: 0 /100 WBC
PHOSPHATE SERPL-MCNC: 3.3 MG/DL (ref 2.7–4.5)
PLATELET # BLD AUTO: 228 K/UL (ref 150–350)
PMV BLD AUTO: 13.8 FL (ref 9.2–12.9)
POTASSIUM SERPL-SCNC: 3.5 MMOL/L (ref 3.5–5.1)
PROT SERPL-MCNC: 5.6 G/DL (ref 6–8.4)
PROTHROMBIN TIME: 12 SEC (ref 9–12.5)
RBC # BLD AUTO: 2.76 M/UL (ref 4–5.4)
SODIUM SERPL-SCNC: 138 MMOL/L (ref 136–145)
WBC # BLD AUTO: 9.58 K/UL (ref 3.9–12.7)

## 2020-01-30 PROCEDURE — 99232 PR SUBSEQUENT HOSPITAL CARE,LEVL II: ICD-10-PCS | Mod: ,,, | Performed by: HOSPITALIST

## 2020-01-30 PROCEDURE — 88307 PR  SURG PATH,LEVEL V: ICD-10-PCS | Mod: 26,,, | Performed by: PATHOLOGY

## 2020-01-30 PROCEDURE — 25000003 PHARM REV CODE 250: Performed by: STUDENT IN AN ORGANIZED HEALTH CARE EDUCATION/TRAINING PROGRAM

## 2020-01-30 PROCEDURE — 88313 SPECIAL STAINS GROUP 2: CPT | Mod: 26,,, | Performed by: PATHOLOGY

## 2020-01-30 PROCEDURE — 85025 COMPLETE CBC W/AUTO DIFF WBC: CPT

## 2020-01-30 PROCEDURE — 88313 SPECIAL STAINS GROUP 2: CPT | Performed by: PATHOLOGY

## 2020-01-30 PROCEDURE — 85610 PROTHROMBIN TIME: CPT

## 2020-01-30 PROCEDURE — 63600175 PHARM REV CODE 636 W HCPCS: Performed by: STUDENT IN AN ORGANIZED HEALTH CARE EDUCATION/TRAINING PROGRAM

## 2020-01-30 PROCEDURE — 88307 TISSUE EXAM BY PATHOLOGIST: CPT | Performed by: PATHOLOGY

## 2020-01-30 PROCEDURE — 88313 PR  SPECIAL STAINS,GROUP II: ICD-10-PCS | Mod: 26,,, | Performed by: PATHOLOGY

## 2020-01-30 PROCEDURE — 80053 COMPREHEN METABOLIC PANEL: CPT

## 2020-01-30 PROCEDURE — 83735 ASSAY OF MAGNESIUM: CPT

## 2020-01-30 PROCEDURE — 36415 COLL VENOUS BLD VENIPUNCTURE: CPT

## 2020-01-30 PROCEDURE — 97110 THERAPEUTIC EXERCISES: CPT

## 2020-01-30 PROCEDURE — 99232 SBSQ HOSP IP/OBS MODERATE 35: CPT | Mod: ,,, | Performed by: HOSPITALIST

## 2020-01-30 PROCEDURE — 20600001 HC STEP DOWN PRIVATE ROOM

## 2020-01-30 PROCEDURE — 84100 ASSAY OF PHOSPHORUS: CPT

## 2020-01-30 PROCEDURE — 88307 TISSUE EXAM BY PATHOLOGIST: CPT | Mod: 26,,, | Performed by: PATHOLOGY

## 2020-01-30 RX ORDER — HYDROXYZINE HYDROCHLORIDE 25 MG/1
25 TABLET, FILM COATED ORAL 3 TIMES DAILY PRN
Status: DISCONTINUED | OUTPATIENT
Start: 2020-01-30 | End: 2020-02-06 | Stop reason: HOSPADM

## 2020-01-30 RX ORDER — MIDAZOLAM HYDROCHLORIDE 1 MG/ML
INJECTION INTRAMUSCULAR; INTRAVENOUS CODE/TRAUMA/SEDATION MEDICATION
Status: COMPLETED | OUTPATIENT
Start: 2020-01-30 | End: 2020-01-30

## 2020-01-30 RX ORDER — FENTANYL CITRATE 50 UG/ML
INJECTION, SOLUTION INTRAMUSCULAR; INTRAVENOUS CODE/TRAUMA/SEDATION MEDICATION
Status: COMPLETED | OUTPATIENT
Start: 2020-01-30 | End: 2020-01-30

## 2020-01-30 RX ORDER — KETOROLAC TROMETHAMINE 10 MG/1
10 TABLET, FILM COATED ORAL EVERY 6 HOURS PRN
Status: DISPENSED | OUTPATIENT
Start: 2020-01-30 | End: 2020-02-02

## 2020-01-30 RX ORDER — HYDROXYZINE HYDROCHLORIDE 25 MG/1
25 TABLET, FILM COATED ORAL 3 TIMES DAILY PRN
Start: 2020-01-30 | End: 2020-02-06

## 2020-01-30 RX ORDER — SODIUM CHLORIDE 9 MG/ML
INJECTION, SOLUTION INTRAVENOUS
Status: COMPLETED | OUTPATIENT
Start: 2020-01-30 | End: 2020-01-30

## 2020-01-30 RX ADMIN — HYDROXYZINE HYDROCHLORIDE 25 MG: 25 TABLET, FILM COATED ORAL at 06:01

## 2020-01-30 RX ADMIN — MIDAZOLAM HYDROCHLORIDE 0.5 MG: 1 INJECTION, SOLUTION INTRAMUSCULAR; INTRAVENOUS at 02:01

## 2020-01-30 RX ADMIN — SODIUM CHLORIDE 500 ML: 0.9 INJECTION, SOLUTION INTRAVENOUS at 02:01

## 2020-01-30 RX ADMIN — KETOROLAC TROMETHAMINE 10 MG: 10 TABLET, FILM COATED ORAL at 12:01

## 2020-01-30 RX ADMIN — ENOXAPARIN SODIUM 40 MG: 100 INJECTION SUBCUTANEOUS at 04:01

## 2020-01-30 RX ADMIN — FENTANYL CITRATE 50 MCG: 50 INJECTION, SOLUTION INTRAMUSCULAR; INTRAVENOUS at 02:01

## 2020-01-30 RX ADMIN — KETOROLAC TROMETHAMINE 10 MG: 10 TABLET, FILM COATED ORAL at 10:01

## 2020-01-30 RX ADMIN — SODIUM CHLORIDE, SODIUM LACTATE, POTASSIUM CHLORIDE, AND CALCIUM CHLORIDE 1000 ML: .6; .31; .03; .02 INJECTION, SOLUTION INTRAVENOUS at 11:01

## 2020-01-30 RX ADMIN — SODIUM CHLORIDE 500 ML: 0.9 INJECTION, SOLUTION INTRAVENOUS at 03:01

## 2020-01-30 NOTE — ASSESSMENT & PLAN NOTE
53 yo Female with history of fatty liver disease presents with acute liver injury at OSH. Denies alcohol and IV drug use. Reported recent extra strength tylenol use prior to admission and found to be Hep C positive on labs. Received NAC at OSH. DDX include acute injury from Hepatitis C vs  DILI from tylenol vs autoimmune.  Workup: ceruloplasmin WNL, HIV neg, Ferritin 20k  Acute hep panel +hepC ab, Hep C RNA elevated >8mil, autoimmune workup pending.     Plan:  -Continue NAC for until AST/ALT <1k  - IV vitamin K supplementation  -Supportive care  -Hepatology following: appreciate assistance  -Plan for liver biopsy on 1/30, NPO at midnight

## 2020-01-30 NOTE — NURSING
Pt arrives for Liver Bx to US. Orders, hx, labs reviewed.Consent attained at the bedside. Pt aaox4, able to state why she is here. .

## 2020-01-30 NOTE — PLAN OF CARE
US liver bx completed. Pt tolerated well. NAD noted or reported. Pt to recover in ROCU, 1 hr w/ sandbag, then ok to transfer back to her room, to remain flat for 1 hr UNTIL 1515. Site CDI. Report given to ROCU and primary nurse. Pain management protocol explained to pt.

## 2020-01-30 NOTE — NURSING
Pt to ROCU post procedure bay #4. Recd bedside report from KONG Newby. Pt arrived a/o and denies pain. Pt has sandbag in place for one hour and off @ 1600.

## 2020-01-30 NOTE — CONSULTS
"IR Note    Consult received for transjugular liver biopsy. H&P reviewed. 54yoF c questionable NAFLD and concern for acute DILI. Hepatology following. Discussed with hepatology fellow, Dr. Elan Hernandez and staff, Dr. Rupinder Mendoza. No need for hepatic venous manometry. No indication for transjugular sampling. Percutaneous US guided biopsy is recommended. This can be performed in US department. Please place order for "US biopsy liver" if still desired. Would likely be done sooner. Primary team can call [phone] to expedite scheduling. This was discussed with IM4 team at [phone].    Sp Bell MD  R[facility], Diagnostic and Interventional Radiology  Pager: [phone]       "

## 2020-01-30 NOTE — PLAN OF CARE
Problem: Occupational Therapy Goal  Goal: Occupational Therapy Goal  Description  Goals to be met by: 2/12/20    Patient will increase functional independence with ADLs by performing:    UE Dressing with [place].  LE Dressing with Supervision.  Grooming while standing at sink with Supervision.  Toileting from toilet with Supervision for hygiene and clothing management.   Supine to sit with [place].  Toilet transfer to toilet with Supervision.  Upper extremity exercise program per handout, with independence.     Outcome: Ongoing, Progressing   Pt progressing towards goals.   Kat Arce OT  1/30/2020

## 2020-01-30 NOTE — PROGRESS NOTES
[facility] Hospital Medicine  Progress Note    Patient Name: Albania Richter  MRN: [id number]  Patient Class: IP- Inpatient   Admission Date: 1/26/2020  Length of Stay: 4 days  Attending Physician: Zheng Thompson MD  Primary Care Provider: John Acosta Jr, MD    Hospital Medicine [place] [facility] 4 Sary Arango MD    Subjective:     Principal Problem:Liver injury    HPI:  Albania Richter is a 54 year old female with history of fatty liver disease who presents from [facility] for acute liver injury and hepatology evaluation.    She initially presented to the OSH after 1 week of abdominal pain and increased fatigue. She denies fever or chills, nausea or vomiting. She denies confusion. Her only medications ibuprofen and extra strength tylenol (x 2 q4hrs) which she states she only took occasionally for headache. She denies smoking, alcohol, and illicit drug use. She does not have a family history of liver disease or autoimmune disease.     At the OSH, she was BP 90/60's and afebrile on presentation. She had no leukocytosis. Labs were significant for Tbili 4.5, AST/ALT 1662/1831, and alk phos 247. CT scan showed inflamed gallbladder and concerns for acalculous cholecystitis. Surgery was consulted and performed Lap cholecystomy the following day. Intraoperative cholangiogram was negative. HIDA later found to be negative. Subsequent labs on 1/25 were significant for HCV Ab positive. On 1/24, she was started on IV acetylcysteine at the recommendation of GI due to a history of increased Tylenol intake prior to arrival (taking 8-500mg Tylenol daily for 4 days prior to admission, apap level 2.4) which was noted to be complete per transfer note.  .    [place] [facility] [place]atology evaluation.    Overview/Hospital Course:  Ms. Richter presented as a transfer for hepatology evaluation for acute liver injury. Upon admission she was found to have AST/ALT  of 1437 and 1401, T Brennan was 24.8. NAC was continued due to reported tylenol use and she was started on lactulose due to complaints of extreme fatigue and an elevated ammonia of 86. Hepatology was consulted and subsequent workup was positive for hep C antibody with moderate HCV viral load (>8mil). Workup was negative for HIV, Hep A &B, HSV and ceruloplasmin was wnl. Autoimmune workup was negative. On 1/29 NAC was discontinued as LFTs continued to trend down to less than 1k. During admission she received ceftriaxone x3 doses but due to no overt signs of infection, it was discontinued.  On 1/30 she had an ultrasound guided liver biopsy was instructed to follow up with hepatology for the results. Throughout admission she has reported small improvements in how she is feeling however overall still complains of fatigue. If LFTs continue to improve and she remains stable, expected d/c on 1/31.    Interval History: Hypotensive overnight however pt reports she slept well and feels better after getting adequate rest. She does report some anxiety over her recent hospitalization.     Review of Systems   Constitutional: Positive for fatigue. Negative for activity change, appetite change, chills, diaphoresis, fever and unexpected weight change.   HENT: Negative for sore throat and trouble swallowing.    Eyes: Negative for visual disturbance.   Respiratory: Negative for chest tightness and shortness of breath.    Cardiovascular: Negative for chest pain and leg swelling.   Gastrointestinal: Positive for abdominal distention and nausea. Negative for abdominal pain, constipation, diarrhea and vomiting.   Genitourinary: Negative for dysuria and hematuria.   Musculoskeletal: Negative for arthralgias and myalgias.   Skin: Negative for rash.   Neurological: Negative for dizziness, weakness, light-headedness and headaches.   Psychiatric/Behavioral: Negative for agitation and confusion.     Objective:     Vital Signs (Most Recent):  Temp:  98 °F (36.7 °C) (01/30/20 1127)  Pulse: 71 (01/30/20 1430)  Resp: (!) 22 (01/30/20 1430)  BP: 98/63 (01/30/20 1430)  SpO2: 100 % (01/30/20 1430) Vital Signs (24h Range):  Temp:  [98 °F (36.7 °C)-98.9 °F (37.2 °C)] 98 °F (36.7 °C)  Pulse:  [64-88] 71  Resp:  [15-24] 22  SpO2:  [97 %-100 %] 100 %  BP: ()/(50-69) 98/63     Weight: 95.7 kg (210 lb 15.7 oz)  Body mass index is 34.05 kg/m².    Intake/Output Summary (Last 24 hours) at 1/30/2020 1434  Last data filed at 1/30/2020 0600  Gross per 24 hour   Intake 1300 ml   Output 550 ml   Net 750 ml      Physical Exam   Constitutional: She is oriented to person, place, and time. No distress.   HENT:   Head: Normocephalic and atraumatic.   Mouth/Throat: Oropharynx is clear and moist. No oropharyngeal exudate.   Eyes: Conjunctivae are normal. Scleral icterus is present.   Cardiovascular: Normal rate, regular rhythm, normal heart sounds and intact distal pulses.   Pulmonary/Chest: Effort normal and breath sounds normal. No respiratory distress.   Abdominal: Soft. Bowel sounds are normal. She exhibits no distension. There is no tenderness. There is no guarding.   Healing laproscopic incisions.   Musculoskeletal: She exhibits edema. She exhibits no tenderness.   Lymphadenopathy:     She has no cervical adenopathy.   Neurological: She is alert and oriented to person, place, and time.   No asterixis.   Skin: Skin is warm and dry. She is not diaphoretic.   Jaundiced   Psychiatric: She has a normal mood and affect. Her behavior is normal.   Nursing note and vitals reviewed.      Significant Labs: All pertinent labs within the past 24 hours have been reviewed.    Significant Imaging: I have reviewed and interpreted all pertinent imaging results/findings within the past 24 hours.      Assessment/Plan:      * Acute Liver Injury  55 yo Female with history of fatty liver disease presents with acute liver injury at OSH. Denies alcohol and IV drug use. Reported recent extra strength  tylenol use prior to admission and found to be Hep C positive on labs. Received NAC at OSH. DDX include acute injury from Hepatitis C vs  DILI from tylenol vs autoimmune.  Workup: ceruloplasmin WNL, HIV neg, Ferritin 20k  Acute hep panel +hepC ab, Hep C RNA elevated >8mil, autoimmune workup pending.     Plan:  - D/c NAC, LFTs improved   - D/c ceftriaxone, no sign of infection during initial presentation or throughout admission  - D/c lactulose, pt without any signs of encephalopathy.   - IV vitamin K supplementation (x3 days completed)  - Continue supportive care with IV hydration in the setting of decreased oral intake  - Hepatology following: appreciate assistance  - Liver biopsy on 1/30, will f/u results outpatient       Hypokalemia  3.3 on admit, subsequently improved to wnl.   -BMP daily, replete prn    Hypophosphatemia  2.4 on admit, subsequently improved to wnl  - BMP daily, replete prn      Chronic hepatitis C without hepatic coma  Found to be Hep C at OSH. Denies illicit drug use.    Plan:  -Hep C RNA: >8mil, Hep C ab +  -Hepatology following: appreciate assistance      VTE Risk Mitigation (From admission, onward)         Ordered     enoxaparin injection 40 mg  Daily      01/27/20 0127     IP VTE HIGH RISK PATIENT  Once      01/26/20 2351     Reason for No Pharmacological VTE Prophylaxis  Once     Question:  Reasons:  Answer:  Risk of Bleeding    01/26/20 2351                      Sary Arango MD  Department of Hospital Medicine   [facility]

## 2020-01-30 NOTE — PLAN OF CARE
Problem: Adult Inpatient Plan of Care  Goal: Plan of Care Review  Outcome: Ongoing, Progressing     POC reviewed with Pt and family. VSS. A&Ox4. No acute changes. Liver biopsy completed today and Pt tolerated well. IV fluids given per MD order. Safety checks preformed. Call light in reach. All questions answered. Will continue to monitor.

## 2020-01-30 NOTE — PLAN OF CARE
Vitals remain consistent for pt with continued hypotension, received another bolus this am with minimal effectiveness noted, prn pain med and anxiety med given at bedtime, pt slept intermittently 3-4 hours, having temperature regulation issues the last few hours and has been covering/uncovering self accordingly, family remained at bedside for pt's comfort, call light in reach, bed alarm in use for safety, will continue to monitor

## 2020-01-30 NOTE — ASSESSMENT & PLAN NOTE
Found to be Hep C at OSH. Denies illicit drug use.    Plan:  -Hep C RNA: >8mil  -Hepatology following: appreciate assistance

## 2020-01-30 NOTE — TREATMENT PLAN
Hepatology Treatment Plan    Albania Richter is a 54 y.o. female admitted to hospital 1/26/2020 (Hospital Day: 5) due to Liver injury.     Interval History  - pending liver biopsy today.  - reports he is feeling well, denies any complaints.  She says her abdominal pain is improving.  Denies any headache.  No nausea or vomiting. Appetite remains poor though she is eating.  She is hesitant to get out of bed but is waiting to work with PT  - LFTs continue to improve, bilirubin improved to 14    Objective  Temp:  [98 °F (36.7 °C)-98.9 °F (37.2 °C)] 98 °F (36.7 °C) (01/30 1127)  Pulse:  [14-88] 77 (01/30 1445)  BP: ()/(50-69) 100/63 (01/30 1445)  Resp:  [15-80] 17 (01/30 1445)  SpO2:  [97 %-100 %] 100 % (01/30 1445)    General: Alert, Oriented x3, no distress  Neurologic: Asterixis absent  Abdomen: Normoactive bowel sounds. Non-distended. Normal tympany. Soft. Non-tender. No peritoneal signs.    Laboratory    Lab Results   Component Value Date    WBC 9.58 01/30/2020    HGB 8.5 (L) 01/30/2020    HCT 26.0 (L) 01/30/2020    MCV 94 01/30/2020     01/30/2020       Lab Results   Component Value Date     01/30/2020    K 3.5 01/30/2020     01/30/2020    CO2 25 01/30/2020    BUN 9 01/30/2020    CREATININE 1.0 01/30/2020    CALCIUM 8.2 (L) 01/30/2020       Lab Results   Component Value Date    ALBUMIN 2.1 (L) 01/30/2020     (H) 01/30/2020     (H) 01/30/2020    ALKPHOS 152 (H) 01/30/2020    BILITOT 14.1 (H) 01/30/2020       Lab Results   Component Value Date    INR 1.2 01/30/2020    INR 1.2 01/29/2020    INR 1.3 (H) 01/28/2020       MELD-Na score: 18 at 1/30/2020  5:21 AM  MELD score: 18 at 1/30/2020  5:21 AM  Calculated from:  Serum Creatinine: 1.0 mg/dL at 1/30/2020  5:21 AM  Serum Sodium: 138 mmol/L (Rounded to 137 mmol/L) at 1/30/2020  5:21 AM  Total Bilirubin: 14.1 mg/dL at 1/30/2020  5:21 AM  INR(ratio): 1.2 at 1/30/2020  5:21 AM  Age: 54 years    Plan  - continue supportive care.  -  liver biopsy today  - discontinue NAC  - anticipate discharge tomorrow, PT OT  - please obtain daily CBC, BMP, LFT, INR  - Plan of care was discussed with primary team    Thank you for involving us in the care of Albania Richter. Please call with any additional concerns or questions.    Elan Hernandez MD  Gastroenterology Fellow

## 2020-01-30 NOTE — H&P
Inpatient Radiology Pre-procedure Note    History of Present Illness:  Albania Richter is a 54 y.o. female who presents for US guided random liver biopsy.    Admission H&P reviewed.  Past Medical History:   Diagnosis Date    Fatty liver disease, nonalcoholic     Hepatitis C 2020    VL PCR +    S/P laparoscopic cholecystectomy 2020     Past Surgical History:   Procedure Laterality Date     x3         Review of Systems:   As documented in primary team H&P    Home Meds:   Prior to Admission medications    Medication Sig Start Date End Date Taking? Authorizing Provider   acetaminophen (TYLENOL EXTRA STRENGTH) 500 MG tablet Take 500 mg by mouth every 6 (six) hours as needed for Pain.  20 Yes Historical Provider, MD   ibuprofen (ADVIL,MOTRIN) 600 MG tablet Take 600 mg by mouth every 8 (eight) hours as needed for Pain.  20 Yes Historical Provider, MD     Scheduled Meds:    enoxaparin  40 mg Subcutaneous Daily    lactated ringers  1,000 mL Intravenous Once     Continuous Infusions:   PRN Meds:hydrOXYzine, ondansetron, pneumoc 13-rosemary conj-dip cr(PF), sodium chloride 0.9%  Anticoagulants/Antiplatelets: no anticoagulation    Allergies: Review of patient's allergies indicates:  No Known Allergies  Sedation Hx: have not been any systemic reactions    Labs:  Recent Labs   Lab 20  0521   INR 1.2       Recent Labs   Lab 20  0521   WBC 9.58   HGB 8.5*   HCT 26.0*   MCV 94         Recent Labs   Lab 20  0013  20  0521      < > 81      < > 138   K 3.5   < > 3.5      < > 107   CO2 24   < > 25   BUN 10   < > 9   CREATININE 0.9   < > 1.0   CALCIUM 8.7   < > 8.2*   MG 2.0   < > 1.9   ALT 1,451*   < > 972*   AST 1,476*   < > 891*   ALBUMIN 2.7*   < > 2.1*   BILITOT 24.5*   < > 14.1*   BILIDIR >14.0*  --   --     < > = values in this interval not displayed.         Vitals:  Temp: 98 °F (36.7 °C) (20 1127)  Pulse: 64 (20 1127)  Resp: 20 (20  1127)  BP: (!) 96/50 (01/30/20 1127)  SpO2: 100 % (01/30/20 1127)     Physical Exam:  ASA: 3  Mallampati: 3    General: no acute distress, jaundiced  Mental Status: alert and oriented to person, place and time  HEENT: normocephalic, atraumatic  Chest: unlabored breathing  Abdomen: nondistended  Extremity: moves all extremities    Plan: US guided random liver biopsy  Sedation Plan: Moderate    Shruthi Pastrana MD  Resident  Department of Radiology  Pager: [phone]

## 2020-01-30 NOTE — ASSESSMENT & PLAN NOTE
53 yo Female with history of fatty liver disease presents with acute liver injury at OSH. Denies alcohol and IV drug use. Reported recent extra strength tylenol use prior to admission and found to be Hep C positive on labs. Received NAC at OSH. DDX include acute injury from Hepatitis C vs  DILI from tylenol vs autoimmune.  Workup: ceruloplasmin WNL, HIV neg, Ferritin 20k  Acute hep panel +hepC ab, Hep C RNA elevated >8mil, autoimmune workup pending.     Plan:  - D/c NAC, LFTs improved   - D/c ceftriaxone, no sign of infection during initial presentation or throughout admission  - D/c lactulose, pt without any signs of encephalopathy.   - IV vitamin K supplementation (x3 days completed)  - Continue supportive care with IV hydration in the setting of decreased oral intake  - Hepatology following: appreciate assistance  - Liver biopsy on 1/30, will f/u results outpatient

## 2020-01-30 NOTE — PROGRESS NOTES
[facility] Hospital Medicine  Progress Note    Patient Name: Albania Richter  MRN: [id number]  Patient Class: IP- Inpatient   Admission Date: 1/26/2020  Length of Stay: 3 days  Attending Physician: Zheng Thompson MD  Primary Care Provider: oJhn Acosta Jr, MD    Hospital Medicine Team: [facility] 4 Sary Arango MD    Subjective:     Principal Problem:Liver injury      HPI:  Albania Richter is a 54 year old female with history of fatty liver disease who presents from [facility] for acute liver injury and hepatology evaluation.    She initially presented to the OSH after 1 week of abdominal pain and increased fatigue. She denies fever or chills, nausea or vomiting. She denies confusion. Her only medications ibuprofen and extra strength tylenol (x 2 q4hrs) which she states she only took occasionally for headache. She denies smoking, alcohol, and illicit drug use. She does not have a family history of liver disease or autoimmune disease.     At the OSH, she was BP 90/60's and afebrile on presentation. She had no leukocytosis. Labs were significant for Tbili 4.5, AST/ALT 1662/1831, and alk phos 247. CT scan showed inflamed gallbladder and concerns for acalculous cholecystitis. Surgery was consulted and performed Lap cholecystomy the following day. Intraoperative cholangiogram was negative. HIDA later found to be negative. Subsequent labs on 1/25 were significant for HCV Ab positive. On 1/24, she was started on IV acetylcysteine at the recommendation of GI due to a history of increased Tylenol intake prior to arrival (taking 8-500mg Tylenol daily for 4 days prior to admission, apap level 2.4) which was noted to be complete per transfer note.  .    Transfer to [facility] was requested for further Hepatology evaluation.    Overview/Hospital Course:  Ms. Richter presented as a transfer for hepatology evaluation for acute liver injury. Upon admission she was found to have  AST/ALT of 1437 and 1401, T Brennan was 24.8. NAC was continued (reported tylenol) and she was started on lactulose due to complaints of extreme fatigue and an elevated ammonia of 86. Hepatology was consulted and workup was positive for hep C antibody and HCV viral load was moderate (>8mil). Workup was also negative for HIV, Hep A &B, HSV and ceruloplasmin was wnl.     Interval History: Pt reports feeling better overall but still has complaints of fatigue and intermittent nausea. LFTs are improving and hepatic autoimmune workup is pending.     Review of Systems   Constitutional: Positive for fatigue. Negative for activity change, appetite change, chills, diaphoresis, fever and unexpected weight change.   HENT: Negative for sore throat and trouble swallowing.    Eyes: Negative for visual disturbance.   Respiratory: Negative for chest tightness and shortness of breath.    Cardiovascular: Negative for chest pain and leg swelling.   Gastrointestinal: Positive for abdominal distention and nausea. Negative for abdominal pain, constipation, diarrhea and vomiting.   Genitourinary: Negative for dysuria and hematuria.   Musculoskeletal: Negative for arthralgias and myalgias.   Skin: Negative for rash.   Neurological: Negative for dizziness, weakness, light-headedness and headaches.   Psychiatric/Behavioral: Negative for agitation and confusion.     Objective:     Vital Signs (Most Recent):  Temp: 97.9 °F (36.6 °C) (01/29/20 0749)  Pulse: 83 (01/29/20 0759)  Resp: 18 (01/29/20 0749)  BP: 115/66 (01/29/20 0749)  SpO2: 95 % (01/29/20 0749) Vital Signs (24h Range):  Temp:  [97.9 °F (36.6 °C)-98.5 °F (36.9 °C)] 97.9 °F (36.6 °C)  Pulse:  [73-88] 83  Resp:  [17-18] 18  SpO2:  [94 %-99 %] 95 %  BP: (102-121)/(57-67) 115/66     Weight: 95.7 kg (210 lb 15.7 oz)  Body mass index is 34.05 kg/m².    Intake/Output Summary (Last 24 hours) at 1/29/2020 0905  Last data filed at 1/29/2020 0758  Gross per 24 hour   Intake 597 ml   Output --    Net 597 ml      Physical Exam   Constitutional: She is oriented to person, place, and time. No distress.   HENT:   Head: Normocephalic and atraumatic.   Mouth/Throat: Oropharynx is clear and moist. No oropharyngeal exudate.   Eyes: Conjunctivae are normal. Scleral icterus is present.   Cardiovascular: Normal rate, regular rhythm, normal heart sounds and intact distal pulses.   Pulmonary/Chest: Effort normal and breath sounds normal. No respiratory distress.   Abdominal: Soft. Bowel sounds are normal. She exhibits no distension. There is no tenderness. There is no guarding.   Healing laproscopic incisions.   Musculoskeletal: She exhibits edema. She exhibits no tenderness.   Lymphadenopathy:     She has no cervical adenopathy.   Neurological: She is alert and oriented to person, place, and time.   No asterixis.   Skin: Skin is warm and dry. She is not diaphoretic.   Jaundiced   Psychiatric: She has a normal mood and affect. Her behavior is normal.   Nursing note and vitals reviewed.      Significant Labs: All pertinent labs within the past 24 hours have been reviewed.    Significant Imaging: I have reviewed and interpreted all pertinent imaging results/findings within the past 24 hours.      Assessment/Plan:      * Acute Liver Injury  55 yo Female with history of fatty liver disease presents with acute liver injury at OSH. Denies alcohol and IV drug use. Reported recent extra strength tylenol use prior to admission and found to be Hep C positive on labs. Received NAC at OSH. DDX include acute injury from Hepatitis C vs  DILI from tylenol vs autoimmune.  Workup: ceruloplasmin WNL, HIV neg, Ferritin 20k  Acute hep panel +hepC ab, Hep C RNA elevated >8mil, autoimmune workup pending.     Plan:  -Continue NAC for until AST/ALT <1k  - IV vitamin K supplementation  -Supportive care  -Hepatology following: appreciate assistance  -Plan for liver biopsy on 1/30, NPO at midnight      S/P laparoscopic cholecystectomy  S/p  laparoscopic cholecystomy at OSH on 1/24     Hepatitis C  Found to be Hep C at OSH. Denies illicit drug use.    Plan:  -Hep C RNA: >8mil  -Hepatology following: appreciate assistance        VTE Risk Mitigation (From admission, onward)         Ordered     enoxaparin injection 40 mg  Daily      01/27/20 0127     IP VTE HIGH RISK PATIENT  Once      01/26/20 2351     Reason for No Pharmacological VTE Prophylaxis  Once     Question:  Reasons:  Answer:  Risk of Bleeding    01/26/20 5831                Sary Arango MD  Department of Hospital Medicine   [facility]

## 2020-01-30 NOTE — PROCEDURES
Radiology Post-Procedure Note    Pre Op Diagnosis: Abnormal LFTs    Post Op Diagnosis: Same    Procedure: Ultrasound guided liver biopsy    Procedure performed by: Shama Robins MD, MD, Shruthi    Written Informed Consent Obtained: Yes    Specimen Removed: Yes: Single 16 gauge core biopsy of liver    Estimated Blood Loss: Minimal    Findings: Moderate sedation and local anesthesia were used.    A 16-gauge Monopty biopsy device was used to remove 1 random right hepatic lobe specimen.  This specimen was sent to pathology for further evaluation.      The patient tolerated the procedure well.  Please see Imaging report for further details.      Shruthi Pastrana MD  Resident  Department of Radiology  Pager: [phone]

## 2020-01-30 NOTE — ASSESSMENT & PLAN NOTE
Found to be Hep C at OSH. Denies illicit drug use.    Plan:  -Hep C RNA: >8mil, Hep C ab +  -Hepatology following: appreciate assistance

## 2020-01-30 NOTE — PLAN OF CARE
[facility]    HOME HEALTH ORDERS  FACE TO FACE ENCOUNTER    Patient Name: Albania Richter  YOB: 1965    PCP: John Acosta Jr, MD   PCP Address: [address] [facility] [address] / [place] [address]  PCP Phone Number: [phone]  PCP Fax: [phone]    Encounter Date: 01/30/2020    Admit to Home Health    Diagnoses:  Active Hospital Problems    Diagnosis  POA    *Acute Liver Injury [S36.119A]  Yes    Hypokalemia [E87.6]  No    Chronic hepatitis C without hepatic coma [B18.2]  Yes    Hypoalbuminemia [E88.09]  Yes    Hypophosphatemia [E83.39]  Yes    Normocytic anemia [D64.9]  Yes    Hepatic encephalopathy [K72.90]  Yes      Resolved Hospital Problems   No resolved problems to display.       No future appointments.  I have seen and examined this patient face to face today. My clinical findings that support the need for the home health skilled services and home bound status are the following:  Weakness/numbness causing balance and gait disturbance due to Liver Disease making it taxing to leave home.    Allergies:Review of patient's allergies indicates:  No Known Allergies    Diet: 2 gram sodium diet    Activities: activity as tolerated    Nursing:   SN to complete comprehensive assessment including routine vital signs. Instruct on disease process and s/s of complications to report to MD. Review/verify medication list sent home with the patient at time of discharge  and instruct patient/caregiver as needed. Frequency may be adjusted depending on start of care date.    Notify MD if SBP > 160 or < 90; DBP > 90 or < 50; HR > 120 or < 50; Temp > 101; Other:         CONSULTS:    Physical Therapy to evaluate and treat. Evaluate for home safety and equipment needs; Establish/upgrade home exercise program. Perform / instruct on therapeutic exercises, gait training, transfer training, and Range of Motion.  Occupational Therapy to evaluate and treat. Evaluate home environment  for safety and equipment needs. Perform/Instruct on transfers, ADL training, ROM, and therapeutic exercises.    MISCELLANEOUS CARE:  N/A    WOUND CARE ORDERS  n/a    Medications: Review discharge medications with patient and family and provide education.      Current Discharge Medication List      START taking these medications    Details   hydrOXYzine HCl (ATARAX) 25 MG tablet Take 1 tablet (25 mg total) by mouth 3 (three) times daily as needed for Anxiety.         STOP taking these medications       acetaminophen (TYLENOL EXTRA STRENGTH) 500 MG tablet Comments:   Reason for Stopping:         ibuprofen (ADVIL,MOTRIN) 600 MG tablet Comments:   Reason for Stopping:               I certify that this patient is confined to her home and needs physical therapy and occupational therapy.

## 2020-01-30 NOTE — PT/OT/SLP PROGRESS
Occupational Therapy   Treatment/POC change    Name: Albania Richter  MRN: [id number]  Admitting Diagnosis:  Liver injury       Recommendations:     Discharge Recommendations: rehabilitation facility  Discharge Equipment Recommendations:  (TBD pending progress)  Barriers to discharge:  (High fall risk, increased level of skilled assistance wtih ADL/functional mobility )    Assessment:     Albania Richter is a 54 y.o. female with a medical diagnosis of Liver injury.  She presents alert and willing to participate in therapy session. Pt visibly fatigued and reports being NPO since midnight for liver biopsy. Performance deficits affecting function are weakness, gait instability, impaired endurance, impaired balance, impaired self care skills, impaired functional mobilty, decreased lower extremity function, impaired cardiopulmonary response to activity. At this time, pt requires increased level of skilled assistance with ADL and functional mobility compared to pt's PLOF. She would benefit from rehab following d/c to continue to progress towards goals and improve quality of life.     Rehab Prognosis:  Good; patient would benefit from acute skilled OT services to address these deficits and reach maximum level of function.       Plan:     Patient to be seen 4 x/week to address the above listed problems via self-care/home management, therapeutic activities, therapeutic exercises, neuromuscular re-education  · Plan of Care Expires: 02/27/20  · Plan of Care Reviewed with: patient    Subjective     Pain/Comfort:  · Pain Rating 1: 0/10  · Pain Rating Post-Intervention 1: 0/10    Objective:     Communicated with: RN prior to session.  Patient found supine with telemetry, peripheral IV upon OT entry to room.    General Precautions: Standard, fall   Orthopedic Precautions:N/A   Braces: N/A     Occupational Performance:     Bed Mobility:    · Patient completed Rolling/Turning to Right with stand by assistance  · Patient completed Supine  to Sit with stand by assistance  · Patient completed Sit to Supine with minimal assistance ( LE management)    Functional Mobility/Transfers:  · Patient completed Sit <> Stand Transfer with contact guard assistance  with  rolling walker   · Functional Mobility:  Pt completed lateral steps at bedside requiring CGA using RW; distance limited 2/2 fatigue and weakness    Activities of Daily Living:  · Upper Body Dressing: moderate assistance to deirdre gown like jacket while seated EOB    [facility] 6 Click ADL: 20    Treatment & Education:  -Pt edu on OT role/POC-- no family in room  -Importance of OOB activity with staff assistance ( UIC during each meal)  -Safety during functional t/f and mobility  -White board updated  - All questions/concerns answered within OT scope of practice  - Pt completed BUE AROM exercises while seated EOB including: 1x15 reps in shoulder flexion, elbow flexion/extension, chest press, and alternating punches. Pt required moderate rest breaks between each exercises and cues for pursed lip breathing.     Patient left supine with all lines intact, call button in reach and Rn notifiedEducation:      GOALS:   Multidisciplinary Problems     Occupational Therapy Goals        Problem: Occupational Therapy Goal    Goal Priority Disciplines Outcome Interventions   Occupational Therapy Goal     OT, PT/OT Ongoing, Progressing    Description:  Goals to be met by: 2/12/20    Patient will increase functional independence with ADLs by performing:    UE Dressing with [place].  LE Dressing with Supervision.  Grooming while standing at sink with Supervision.  Toileting from toilet with Supervision for hygiene and clothing management.   Supine to sit with [place].  Toilet transfer to toilet with Supervision.  Upper extremity exercise program per handout, with independence.                      Time Tracking:     OT Date of Treatment: 01/30/20  OT Start Time: 1043  OT Stop Time: 1110  OT Total Time (min): 27  min    Billable Minutes:Therapeutic Exercise 27    Kat Arce, OT  1/30/2020

## 2020-01-30 NOTE — SUBJECTIVE & OBJECTIVE
Interval History: Hypotensive overnight however pt reports she slept well and feels better after getting adequate rest. She does report some anxiety over her recent hospitalization.     Review of Systems   Constitutional: Positive for fatigue. Negative for activity change, appetite change, chills, diaphoresis, fever and unexpected weight change.   HENT: Negative for sore throat and trouble swallowing.    Eyes: Negative for visual disturbance.   Respiratory: Negative for chest tightness and shortness of breath.    Cardiovascular: Negative for chest pain and leg swelling.   Gastrointestinal: Positive for abdominal distention and nausea. Negative for abdominal pain, constipation, diarrhea and vomiting.   Genitourinary: Negative for dysuria and hematuria.   Musculoskeletal: Negative for arthralgias and myalgias.   Skin: Negative for rash.   Neurological: Negative for dizziness, weakness, light-headedness and headaches.   Psychiatric/Behavioral: Negative for agitation and confusion.     Objective:     Vital Signs (Most Recent):  Temp: 98 °F (36.7 °C) (01/30/20 1127)  Pulse: 71 (01/30/20 1430)  Resp: (!) 22 (01/30/20 1430)  BP: 98/63 (01/30/20 1430)  SpO2: 100 % (01/30/20 1430) Vital Signs (24h Range):  Temp:  [98 °F (36.7 °C)-98.9 °F (37.2 °C)] 98 °F (36.7 °C)  Pulse:  [64-88] 71  Resp:  [15-24] 22  SpO2:  [97 %-100 %] 100 %  BP: ()/(50-69) 98/63     Weight: 95.7 kg (210 lb 15.7 oz)  Body mass index is 34.05 kg/m².    Intake/Output Summary (Last 24 hours) at 1/30/2020 1434  Last data filed at 1/30/2020 0600  Gross per 24 hour   Intake 1300 ml   Output 550 ml   Net 750 ml      Physical Exam   Constitutional: She is oriented to person, place, and time. No distress.   HENT:   Head: Normocephalic and atraumatic.   Mouth/Throat: Oropharynx is clear and moist. No oropharyngeal exudate.   Eyes: Conjunctivae are normal. Scleral icterus is present.   Cardiovascular: Normal rate, regular rhythm, normal heart sounds and  intact distal pulses.   Pulmonary/Chest: Effort normal and breath sounds normal. No respiratory distress.   Abdominal: Soft. Bowel sounds are normal. She exhibits no distension. There is no tenderness. There is no guarding.   Healing laproscopic incisions.   Musculoskeletal: She exhibits edema. She exhibits no tenderness.   Lymphadenopathy:     She has no cervical adenopathy.   Neurological: She is alert and oriented to person, place, and time.   No asterixis.   Skin: Skin is warm and dry. She is not diaphoretic.   Jaundiced   Psychiatric: She has a normal mood and affect. Her behavior is normal.   Nursing note and vitals reviewed.      Significant Labs: All pertinent labs within the past 24 hours have been reviewed.    Significant Imaging: I have reviewed and interpreted all pertinent imaging results/findings within the past 24 hours.

## 2020-01-31 ENCOUNTER — TELEPHONE (OUTPATIENT)
Dept: HEPATOLOGY | Facility: CLINIC | Age: 55
End: 2020-01-31

## 2020-01-31 DIAGNOSIS — B18.2 CHRONIC HEPATITIS C WITHOUT HEPATIC COMA: ICD-10-CM

## 2020-01-31 PROBLEM — R53.81 DEBILITY: Status: ACTIVE | Noted: 2020-01-31

## 2020-01-31 LAB
ALBUMIN SERPL BCP-MCNC: 2.4 G/DL (ref 3.5–5.2)
ALP SERPL-CCNC: 173 U/L (ref 55–135)
ALT SERPL W/O P-5'-P-CCNC: 925 U/L (ref 10–44)
ANION GAP SERPL CALC-SCNC: 9 MMOL/L (ref 8–16)
AST SERPL-CCNC: 909 U/L (ref 10–40)
BASOPHILS # BLD AUTO: 0.04 K/UL (ref 0–0.2)
BASOPHILS NFR BLD: 0.5 % (ref 0–1.9)
BILIRUB SERPL-MCNC: 13.9 MG/DL (ref 0.1–1)
BUN SERPL-MCNC: 11 MG/DL (ref 6–20)
CALCIUM SERPL-MCNC: 8.2 MG/DL (ref 8.7–10.5)
CHLORIDE SERPL-SCNC: 108 MMOL/L (ref 95–110)
CO2 SERPL-SCNC: 22 MMOL/L (ref 23–29)
CREAT SERPL-MCNC: 1 MG/DL (ref 0.5–1.4)
DIFFERENTIAL METHOD: ABNORMAL
EOSINOPHIL # BLD AUTO: 0.5 K/UL (ref 0–0.5)
EOSINOPHIL NFR BLD: 5.6 % (ref 0–8)
ERYTHROCYTE [DISTWIDTH] IN BLOOD BY AUTOMATED COUNT: 17.2 % (ref 11.5–14.5)
EST. GFR  (AFRICAN AMERICAN): >60 ML/MIN/1.73 M^2
EST. GFR  (NON AFRICAN AMERICAN): >60 ML/MIN/1.73 M^2
GLUCOSE SERPL-MCNC: 105 MG/DL (ref 70–110)
HCT VFR BLD AUTO: 28.8 % (ref 37–48.5)
HGB BLD-MCNC: 9 G/DL (ref 12–16)
IMM GRANULOCYTES # BLD AUTO: 0.32 K/UL (ref 0–0.04)
IMM GRANULOCYTES NFR BLD AUTO: 3.6 % (ref 0–0.5)
INR PPP: 1.1 (ref 0.8–1.2)
LYMPHOCYTES # BLD AUTO: 1.9 K/UL (ref 1–4.8)
LYMPHOCYTES NFR BLD: 21.5 % (ref 18–48)
MAGNESIUM SERPL-MCNC: 2 MG/DL (ref 1.6–2.6)
MCH RBC QN AUTO: 30.1 PG (ref 27–31)
MCHC RBC AUTO-ENTMCNC: 31.3 G/DL (ref 32–36)
MCV RBC AUTO: 96 FL (ref 82–98)
MONOCYTES # BLD AUTO: 1.2 K/UL (ref 0.3–1)
MONOCYTES NFR BLD: 13.8 % (ref 4–15)
NEUTROPHILS # BLD AUTO: 4.8 K/UL (ref 1.8–7.7)
NEUTROPHILS NFR BLD: 55 % (ref 38–73)
NRBC BLD-RTO: 0 /100 WBC
PHOSPHATE SERPL-MCNC: 2.8 MG/DL (ref 2.7–4.5)
PLATELET # BLD AUTO: 239 K/UL (ref 150–350)
PMV BLD AUTO: 14.1 FL (ref 9.2–12.9)
POTASSIUM SERPL-SCNC: 3.4 MMOL/L (ref 3.5–5.1)
PROT SERPL-MCNC: 6.2 G/DL (ref 6–8.4)
PROTHROMBIN TIME: 11.6 SEC (ref 9–12.5)
RBC # BLD AUTO: 2.99 M/UL (ref 4–5.4)
SMOOTH MUSCLE AB TITR SER IF: ABNORMAL {TITER}
SODIUM SERPL-SCNC: 139 MMOL/L (ref 136–145)
WBC # BLD AUTO: 8.78 K/UL (ref 3.9–12.7)

## 2020-01-31 PROCEDURE — 99232 PR SUBSEQUENT HOSPITAL CARE,LEVL II: ICD-10-PCS | Mod: ,,, | Performed by: HOSPITALIST

## 2020-01-31 PROCEDURE — 97116 GAIT TRAINING THERAPY: CPT

## 2020-01-31 PROCEDURE — 63600175 PHARM REV CODE 636 W HCPCS: Performed by: STUDENT IN AN ORGANIZED HEALTH CARE EDUCATION/TRAINING PROGRAM

## 2020-01-31 PROCEDURE — 85610 PROTHROMBIN TIME: CPT

## 2020-01-31 PROCEDURE — 25000003 PHARM REV CODE 250: Performed by: STUDENT IN AN ORGANIZED HEALTH CARE EDUCATION/TRAINING PROGRAM

## 2020-01-31 PROCEDURE — 80053 COMPREHEN METABOLIC PANEL: CPT

## 2020-01-31 PROCEDURE — 84100 ASSAY OF PHOSPHORUS: CPT

## 2020-01-31 PROCEDURE — 20600001 HC STEP DOWN PRIVATE ROOM

## 2020-01-31 PROCEDURE — 83735 ASSAY OF MAGNESIUM: CPT

## 2020-01-31 PROCEDURE — 85025 COMPLETE CBC W/AUTO DIFF WBC: CPT

## 2020-01-31 PROCEDURE — 36415 COLL VENOUS BLD VENIPUNCTURE: CPT

## 2020-01-31 PROCEDURE — 99232 SBSQ HOSP IP/OBS MODERATE 35: CPT | Mod: ,,, | Performed by: HOSPITALIST

## 2020-01-31 RX ORDER — POTASSIUM CHLORIDE 750 MG/1
40 CAPSULE, EXTENDED RELEASE ORAL ONCE
Status: COMPLETED | OUTPATIENT
Start: 2020-01-31 | End: 2020-01-31

## 2020-01-31 RX ADMIN — SODIUM CHLORIDE, SODIUM LACTATE, POTASSIUM CHLORIDE, AND CALCIUM CHLORIDE 1000 ML: .6; .31; .03; .02 INJECTION, SOLUTION INTRAVENOUS at 04:01

## 2020-01-31 RX ADMIN — HYDROXYZINE HYDROCHLORIDE 25 MG: 25 TABLET, FILM COATED ORAL at 11:01

## 2020-01-31 RX ADMIN — ONDANSETRON 8 MG: 8 TABLET, ORALLY DISINTEGRATING ORAL at 11:01

## 2020-01-31 RX ADMIN — POTASSIUM CHLORIDE 40 MEQ: 750 CAPSULE, EXTENDED RELEASE ORAL at 11:01

## 2020-01-31 RX ADMIN — HYDROXYZINE HYDROCHLORIDE 25 MG: 25 TABLET, FILM COATED ORAL at 04:01

## 2020-01-31 RX ADMIN — HYDROXYZINE HYDROCHLORIDE 25 MG: 25 TABLET, FILM COATED ORAL at 05:01

## 2020-01-31 RX ADMIN — ENOXAPARIN SODIUM 40 MG: 100 INJECTION SUBCUTANEOUS at 04:01

## 2020-01-31 NOTE — PLAN OF CARE
Problem: Adult Inpatient Plan of Care  Goal: Plan of Care Review  Outcome: Ongoing, Progressing     POC reviewed with Pt and family. VSS. A&Ox4. No acute changes. Activity encouraged. Plan to be d/c to rehab soon. All questions answered. Safety checks preformed. Call light in reach. Will continue to monitor.

## 2020-01-31 NOTE — ASSESSMENT & PLAN NOTE
-PT/OT consulted on admission.   -Recommendations for rehab placement.     Plan:  -Patient pending decision regarding rehab versus discharge home with home health.

## 2020-01-31 NOTE — PROGRESS NOTES
[facility] Hospital Medicine  Progress Note    Patient Name: Albania Richter  MRN: [id number]  Patient Class: IP- Inpatient   Admission Date: 1/26/2020  Length of Stay: 5 days  Attending Physician: Zheng Thompson MD  Primary Care Provider: John Acosta Jr, MD    Hospital Medicine Team: [facility] HOSP MED 4 Abel Schmid MD    Subjective:     Principal Problem:Liver injury    HPI:  Albania Richter is a 54 year old female with history of fatty liver disease who presents from [facility] for acute liver injury and hepatology evaluation.    She initially presented to the OSH after 1 week of abdominal pain and increased fatigue. She denies fever or chills, nausea or vomiting. She denies confusion. Her only medications ibuprofen and extra strength tylenol (x 2 q4hrs) which she states she only took occasionally for headache. She denies smoking, alcohol, and illicit drug use. She does not have a family history of liver disease or autoimmune disease.     At the OSH, she was BP 90/60's and afebrile on presentation. She had no leukocytosis. Labs were significant for Tbili 4.5, AST/ALT 1662/1831, and alk phos 247. CT scan showed inflamed gallbladder and concerns for acalculous cholecystitis. Surgery was consulted and performed Lap cholecystomy the following day. Intraoperative cholangiogram was negative. HIDA later found to be negative. Subsequent labs on 1/25 were significant for HCV Ab positive. On 1/24, she was started on IV acetylcysteine at the recommendation of GI due to a history of increased Tylenol intake prior to arrival (taking 8-500mg Tylenol daily for 4 days prior to admission, apap level 2.4) which was noted to be complete per transfer note.  .    Transfer to [facility] was requested for further Hepatology evaluation.    Overview/Hospital Course:  Ms. Albania Richter was admitted to hospital medicine on 01/26/20 as a transfer for hepatology evaluation for acute liver injury.  Upon admission she was found to have AST/ALT of 1437 and 1401, with hyperbilirubinemia (24.8). Her Tylenol level was negative on admission, however NAC was continued due to reported history of prior Tylenol use. She was also started on Lactulose due to complaints of extreme fatigue and an elevated ammonia of 86 on admission. She was also initiated on Ceftriaxone for possible underlying infectious etiology; discontinued on 01/30 without evidence of infection. She also received three days of Vitamin K supplementation. Hepatology was consulted on admission and subsequent workup was positive for HepC with moderate HCV viral load (>8 mil), however unclear if representative of acute infection. Subsequent workup was negative for HIV, Hep A and B, HSV and underlying Sumit's disease. 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 On 01/29, NAC was discontinued as LFTs continued to trend down to less than 1K. On 01/30, she had an ultrasound guided liver biopsy to help with further clarification of underlying etiology; results pending. PT/OT evaluated patient on 01/30 with recommendations for rehab; she is pending possible placement versus discharge with home health. She is being continuously monitoring for further decline in liver function.     Interval History: No acute events overnight. Patient seen this morning at bedside. She continues to report continued generalized fatigue, however with improving abdominal pain. She is tolerating PO without difficulty. She is still deciding between discharge home with home health vs rehab placement.     Review of Systems   Constitutional: Positive for fatigue. Negative for chills and fever.   Respiratory: Negative for shortness of breath.    Gastrointestinal: Positive for abdominal pain and nausea. Negative for diarrhea and vomiting.   Genitourinary: Negative for difficulty urinating.     Objective:     Vital Signs (Most Recent):  Temp: 97.7 °F (36.5  °C) (01/31/20 1135)  Pulse: 74 (01/31/20 1135)  Resp: 17 (01/31/20 1135)  BP: (!) 98/58 (01/31/20 1135)  SpO2: 97 % (01/31/20 1135) Vital Signs (24h Range):  Temp:  [97.6 °F (36.4 °C)-98.7 °F (37.1 °C)] 97.7 °F (36.5 °C)  Pulse:  [67-80] 74  Resp:  [15-24] 17  SpO2:  [97 %-100 %] 97 %  BP: ()/(49-68) 98/58     Weight: 95.7 kg (210 lb 15.7 oz)  Body mass index is 34.05 kg/m².    Intake/Output Summary (Last 24 hours) at 1/31/2020 1430  Last data filed at 1/31/2020 0500  Gross per 24 hour   Intake 600 ml   Output --   Net 600 ml      Physical Exam   Constitutional: She is oriented to person, place, and time. No distress.   HENT:   Head: Normocephalic and atraumatic.   Mouth/Throat: Oropharynx is clear and moist. No oropharyngeal exudate.   Eyes: Conjunctivae are normal. Scleral icterus is present.   Cardiovascular: Normal rate, regular rhythm, normal heart sounds and intact distal pulses.   Pulmonary/Chest: Effort normal and breath sounds normal. No respiratory distress.   Abdominal: Soft. Bowel sounds are normal. She exhibits no distension. There is no tenderness. There is no guarding.   Healing laproscopic incisions.   Musculoskeletal: She exhibits edema. She exhibits no tenderness.   Lymphadenopathy:     She has no cervical adenopathy.   Neurological: She is alert and oriented to person, place, and time.   No asterixis.   Skin: Skin is warm and dry. She is not diaphoretic.   Jaundiced   Psychiatric: She has a normal mood and affect. Her behavior is normal.   Nursing note and vitals reviewed.      Significant Labs: All pertinent labs within the past 24 hours have been reviewed.    Significant Imaging: I have reviewed all pertinent imaging results/findings within the past 24 hours.      Assessment/Plan:      * Acute Liver Injury  This is a 54 year old female with PMH suggestive of non-alcoholic fatty liver disease who is status post admission on 01/26 as a transfer for acute liver injury (AST, ALT >1000) of  undetermined etiology associated with almost two week duration of preceding generalized fatigue, nausea, and vomiting with remote prior history of Tylenol usage following symptom onset. Initial presentation at OSH reported suggestive of possible cholecystitis, however status post lap.cholecystectomy and intra-operative cholangiogram on 01/23 and HIDA scan (reportedly normal) without improvement in liver function. Tylenol level on admission here negative, but status post NAC until 01/29 with slight improvement, in addition to three days of Ceftriaxone and Vitamin K. Status post hepatology evaluation on admission with work-up thus far significant for underlying Hepatitis C (unclear if acute or chronic infection) and positive anti-smooth muscle antibodies. Liver biopsy results from 01/30 pending. Etiology of presentation unclear; differential diagnoses include secondary to Tylenol toxicity versus autoimmune process versus Hepatitis C.     Plan:  -Continue to trend liver function daily.   -Follow-up liver biopsy results.   -Continue supportive care with IV hydration as necessary.   -[place] diet ordered.   -Hydroxyzine PRN ordered for pruritis and anxiety.   -Avoid all Tylenol base products and minimize use of opioids in order to not precipitate encephalopathy.   -Hepatology referral as outpatient.  -Disposition: home with home health versus rehab.     Debility  -PT/OT consulted on admission.   -Recommendations for rehab placement.     Plan:  -Patient pending decision regarding rehab versus discharge home with home health.       Hypokalemia  Plan:   -Electrolyte repletion ordered PRN.     Normocytic anemia  -Iron studies on admission suggestive of possible anemia of chronic disease, however may be confounded by acute liver injury.       Hypophosphatemia  Plan:   -Electrolyte repletion ordered PRN.       Chronic hepatitis C without hepatic coma  -See assessment for acute liver injury.       VTE Risk Mitigation (From  admission, onward)         Ordered     enoxaparin injection 40 mg  Daily      01/27/20 0127     IP VTE HIGH RISK PATIENT  Once      01/26/20 2351     Reason for No Pharmacological VTE Prophylaxis  Once     Question:  Reasons:  Answer:  Risk of Bleeding    01/26/20 9501                      Abel Schmid MD  Department of Hospital Medicine   [facility]

## 2020-01-31 NOTE — PLAN OF CARE
Problem: Physical Therapy Goal  Goal: Physical Therapy Goal  Description  Goals to be met by: 2020    Patient will increase functional independence with mobility by performin. Supine to sit with [place]  2. Sit to stand transfer with [place]  3. Gait  x 150 feet with [place] using least restrictive AD  4. Lower extremity exercise program x 20 reps per handout, with independence     Outcome: Ongoing, Progressing    Nika Kraft, PT, DPT  2020

## 2020-01-31 NOTE — TREATMENT PLAN
Hepatology Treatment Plan    Albania Richter is a 54 y.o. female admitted to hospital 1/26/2020 (Hospital Day: 6) due to Liver injury.     Interval History  - reports she's continuing to feel better. Abdominal pain continuing to resolve.   - had liver biopsy (percutaneous) yesterday reports mild discomfort during procedure currently pain free  - denies f/c/s. Is ambulatory and working with PT  - reports appetite improving, eating.  - overall transaminases and bilirubin downtrending    Objective  Temp:  [97.6 °F (36.4 °C)-98.7 °F (37.1 °C)] 97.6 °F (36.4 °C) (01/31 0725)  Pulse:  [64-80] 73 (01/31 0725)  BP: ()/(49-69) 110/61 (01/31 0725)  Resp:  [15-24] 20 (01/31 0725)  SpO2:  [97 %-100 %] 99 % (01/31 0725)    General: Alert, Oriented x3, no distress  Neurologic: Asterixis absent  Abdomen: Normoactive bowel sounds. Non-distended. Normal tympany. Soft. Non-tender. No peritoneal signs.    Laboratory    Lab Results   Component Value Date    WBC 8.78 01/31/2020    HGB 9.0 (L) 01/31/2020    HCT 28.8 (L) 01/31/2020    MCV 96 01/31/2020     01/31/2020       Lab Results   Component Value Date     01/31/2020    K 3.4 (L) 01/31/2020     01/31/2020    CO2 22 (L) 01/31/2020    BUN 11 01/31/2020    CREATININE 1.0 01/31/2020    CALCIUM 8.2 (L) 01/31/2020       Lab Results   Component Value Date    ALBUMIN 2.4 (L) 01/31/2020     (H) 01/31/2020     (H) 01/31/2020    ALKPHOS 173 (H) 01/31/2020    BILITOT 13.9 (H) 01/31/2020       Lab Results   Component Value Date    INR 1.1 01/31/2020    INR 1.2 01/30/2020    INR 1.2 01/29/2020       MELD-Na score: 17 at 1/31/2020  5:28 AM  MELD score: 17 at 1/31/2020  5:28 AM  Calculated from:  Serum Creatinine: 1.0 mg/dL at 1/31/2020  5:28 AM  Serum Sodium: 139 mmol/L (Rounded to 137 mmol/L) at 1/31/2020  5:28 AM  Total Bilirubin: 13.9 mg/dL at 1/31/2020  5:28 AM  INR(ratio): 1.1 at 1/31/2020  5:28 AM  Age: 54 years    Plan  - continue supportive care  - s/p  liver biopsy, results pending  - ok for discharge with outpatient follow-up from hepatology standpoint  - will arrange weekly labs and hep follow-up  - please obtain daily CBC, BMP, LFT, INR  - Plan of care was discussed with primary team    Thank you for involving us in the care of Albania Richter. Please call with any additional concerns or questions.    Elan Hernandez MD  Gastroenterology Fellow

## 2020-01-31 NOTE — TELEPHONE ENCOUNTER
----- Message from Kati Garza, RN sent at 1/31/2020 11:49 AM CST -----  Good morning.  Ms. Richter is a new hepatology referral with hepatitis C.  Dr. Mendoza wants her seen in regular hepatology clinic first.  She wants her to have weekly labs for the next month.  She can be seen in a month.  She is not an overly complex patient and does not have a complex medical history.  She can be seen by an [facility] or physician.  I have placed orders for weekly labs.  She lives in the [facility] so labs can be scheduled in the [facility].   Thanks  Jil

## 2020-01-31 NOTE — PLAN OF CARE
No significant events or changes overnight, pt continues to report easily fatigued/dyspnic on exertion, requested medication for pain and anxiety x1 this shift, slept intermittently 3-4 hours, pt is concerned about her condition and anticipating discharge later today, provided information about treatment options and life changes/dietary modifications, provided emotional support to pt and attempted to answer questions, vitals stable this shift, call light in reach, oxygen in use prn when pt becomes anxious, will continue to monitor

## 2020-01-31 NOTE — PT/OT/SLP PROGRESS
"Physical Therapy Treatment    Patient Name:  Albania Richter   MRN:  [id number]    Recommendations:     Discharge Recommendations:  rehabilitation facility   Discharge Equipment Recommendations: walker, rolling   Barriers to discharge: Decreased Caregiver Support (daughter goes to class) High fall risk, increased level of skilled assistance [facility] ADL/functional mobility    Assessment:     Albania Richter is a 54 y.o. female admitted with a medical diagnosis of Liver injury.  She presents with the following impairments/functional limitations:  weakness, impaired endurance, impaired self care skills, impaired functional mobilty, gait instability, impaired balance, decreased safety awareness, impaired cardiopulmonary response to activity . Patient appeared extremely fatigued upon PT arrival. Patient agreeable to therapy and with great effort. Tino sit to stand from chair with RW. Gait 12', RW, CGA, chair to follow. This patient is an excellent candidate for inpatient rehabilitation, has a qualifying diagnosis, shows increasing activity tolerance,  is motivated to participate in treatment, and has a supportive family willing to assist the patient upon discharge.      Rehab Prognosis: Good; patient would benefit from acute skilled PT services to address these deficits and reach maximum level of function.    Recent Surgery: * No surgery found *      Plan:     During this hospitalization, patient to be seen 4 x/week to address the identified rehab impairments via gait training, therapeutic activities, therapeutic exercises, neuromuscular re-education and progress toward the following goals:    · Plan of Care Expires:  02/28/20    Subjective     Chief Complaint: fatigue  Patient/Family Comments/goals: "Okay, that is enough" after ambulating 12'.  Pain/Comfort:  · Pain Rating 1: 0/10  · Pain Rating Post-Intervention 1: 0/10      Objective:     Communicated with nurse prior to session.  Patient found up in chair with   upon PT " entry to room.     General Precautions: Standard, fall   Orthopedic Precautions:N/A   Braces: N/A     Functional Mobility:  · Transfers:     · Sit to Stand:  minimum assistance with rolling walker  · Gait: 12', CGA w/ RW. Decreased paul, decreased gait speed, decreased step length, No LOB. No SOB. Mild diaphroesis noted at end of ambulation.      AM-PAC 6 CLICK MOBILITY  Turning over in bed (including adjusting bedclothes, sheets and blankets)?: 3  Sitting down on and standing up from a chair with arms (e.g., wheelchair, bedside commode, etc.): 3  Moving from lying on back to sitting on the side of the bed?: 3  Moving to and from a bed to a chair (including a wheelchair)?: 3  Need to walk in hospital room?: 3  Climbing 3-5 steps with a railing?: 2  Basic Mobility Total Score: 17       Therapeutic Activities and Exercises:   Pt educated on importance of OOB activity to decrease the risks associated with bed rest. Patient instructed to complete QS,GS,AP 20x each, 3-4x per day over the weekend if remaining in the hospital as she will not be seen by PT/OT. All questions and concerns addressed within PT scope of practice. Patient instructed to discuss rehab recs with /case management as she noted that there is a facility in [place] she prefers to go to. Patient educated on role of PT in the hospital. Pt educated on plan and goals with physical therapy.      Patient left up in chair with all lines intact, call button in reach and brother and daughter present..    GOALS:   Multidisciplinary Problems     Physical Therapy Goals        Problem: Physical Therapy Goal    Goal Priority Disciplines Outcome Goal Variances Interventions   Physical Therapy Goal     PT, PT/OT Ongoing, Progressing     Description:  Goals to be met by: 2020    Patient will increase functional independence with mobility by performin. Supine to sit with [place]  2. Sit to stand transfer with [place]  3. Gait  x  150 feet with [place] using least restrictive AD  4. Lower extremity exercise program x 20 reps per handout, with independence                      Time Tracking:     PT Received On: 01/31/20  PT Start Time: 1459     PT Stop Time: 1512  PT Total Time (min): 13 min     Billable Minutes: Gait Training 13    Treatment Type: Treatment  PT/PTA: PT     PTA Visit Number: 0     Nika Abena, PT  01/31/2020

## 2020-01-31 NOTE — ASSESSMENT & PLAN NOTE
-Iron studies on admission suggestive of possible anemia of chronic disease, however may be confounded by acute liver injury.

## 2020-01-31 NOTE — ASSESSMENT & PLAN NOTE
This is a 54 year old female with PMH suggestive of non-alcoholic fatty liver disease who is status post admission on 01/26 as a transfer for acute liver injury (AST, ALT >1000) of undetermined etiology associated with almost two week duration of preceding generalized fatigue, nausea, and vomiting with remote prior history of Tylenol usage following symptom onset. Initial presentation at OSH reported suggestive of possible cholecystitis, however status post lap.cholecystectomy and intra-operative cholangiogram on 01/23 and HIDA scan (reportedly normal) without improvement in liver function. Tylenol level on admission here negative, but status post NAC until 01/29 with slight improvement, in addition to three days of Ceftriaxone and Vitamin K. Status post hepatology evaluation on admission with work-up thus far significant for underlying Hepatitis C (unclear if acute or chronic infection) and positive anti-smooth muscle antibodies. Liver biopsy results from 01/30 pending. Etiology of presentation unclear; differential diagnoses include secondary to Tylenol toxicity versus autoimmune process versus Hepatitis C.     Plan:  -Continue to trend liver function daily.   -Follow-up liver biopsy results.   -Continue supportive care with IV hydration as necessary.   -Speed diet ordered.   -Hydroxyzine PRN ordered for pruritis and anxiety.   -Avoid all Tylenol base products and minimize use of opioids in order to not precipitate encephalopathy.   -Hepatology referral as outpatient.  -Disposition: home with home health versus rehab.

## 2020-01-31 NOTE — PLAN OF CARE
01/30/20 1708   Discharge Reassessment   Assessment Type Discharge Planning Reassessment   Provided patient/caregiver education on the expected discharge date and the discharge plan Yes   Do you have any problems affording any of your prescribed medications? No   Discharge Plan A Rehab   Discharge Plan B Rehab   DME Needed Upon Discharge  none   Anticipated Discharge Disposition Rehab   Post-Acute Status   Post-Acute Authorization Placement   Post-Acute Placement Status Awaiting Internal Medical Clearance

## 2020-01-31 NOTE — SUBJECTIVE & OBJECTIVE
Interval History: No acute events overnight. Patient seen this morning at bedside. She continues to report continued generalized fatigue, however with improving abdominal pain. She is tolerating PO without difficulty. She is still deciding between discharge home with home health vs rehab placement.     Review of Systems   Constitutional: Positive for fatigue. Negative for chills and fever.   Respiratory: Negative for shortness of breath.    Gastrointestinal: Positive for abdominal pain and nausea. Negative for diarrhea and vomiting.   Genitourinary: Negative for difficulty urinating.     Objective:     Vital Signs (Most Recent):  Temp: 97.7 °F (36.5 °C) (01/31/20 1135)  Pulse: 74 (01/31/20 1135)  Resp: 17 (01/31/20 1135)  BP: (!) 98/58 (01/31/20 1135)  SpO2: 97 % (01/31/20 1135) Vital Signs (24h Range):  Temp:  [97.6 °F (36.4 °C)-98.7 °F (37.1 °C)] 97.7 °F (36.5 °C)  Pulse:  [67-80] 74  Resp:  [15-24] 17  SpO2:  [97 %-100 %] 97 %  BP: ()/(49-68) 98/58     Weight: 95.7 kg (210 lb 15.7 oz)  Body mass index is 34.05 kg/m².    Intake/Output Summary (Last 24 hours) at 1/31/2020 1430  Last data filed at 1/31/2020 0500  Gross per 24 hour   Intake 600 ml   Output --   Net 600 ml      Physical Exam   Constitutional: She is oriented to person, place, and time. No distress.   HENT:   Head: Normocephalic and atraumatic.   Mouth/Throat: Oropharynx is clear and moist. No oropharyngeal exudate.   Eyes: Conjunctivae are normal. Scleral icterus is present.   Cardiovascular: Normal rate, regular rhythm, normal heart sounds and intact distal pulses.   Pulmonary/Chest: Effort normal and breath sounds normal. No respiratory distress.   Abdominal: Soft. Bowel sounds are normal. She exhibits no distension. There is no tenderness. There is no guarding.   Healing laproscopic incisions.   Musculoskeletal: She exhibits edema. She exhibits no tenderness.   Lymphadenopathy:     She has no cervical adenopathy.   Neurological: She is alert  and oriented to person, place, and time.   No asterixis.   Skin: Skin is warm and dry. She is not diaphoretic.   Jaundiced   Psychiatric: She has a normal mood and affect. Her behavior is normal.   Nursing note and vitals reviewed.      Significant Labs: All pertinent labs within the past 24 hours have been reviewed.    Significant Imaging: I have reviewed all pertinent imaging results/findings within the past 24 hours.

## 2020-02-01 LAB
ALBUMIN SERPL BCP-MCNC: 2.3 G/DL (ref 3.5–5.2)
ALP SERPL-CCNC: 162 U/L (ref 55–135)
ALT SERPL W/O P-5'-P-CCNC: 831 U/L (ref 10–44)
ANION GAP SERPL CALC-SCNC: 11 MMOL/L (ref 8–16)
AST SERPL-CCNC: 800 U/L (ref 10–40)
BACTERIA BLD CULT: NORMAL
BACTERIA BLD CULT: NORMAL
BASOPHILS # BLD AUTO: 0.02 K/UL (ref 0–0.2)
BASOPHILS NFR BLD: 0.2 % (ref 0–1.9)
BILIRUB SERPL-MCNC: 12.6 MG/DL (ref 0.1–1)
BUN SERPL-MCNC: 7 MG/DL (ref 6–20)
CALCIUM SERPL-MCNC: 8.8 MG/DL (ref 8.7–10.5)
CHLORIDE SERPL-SCNC: 107 MMOL/L (ref 95–110)
CO2 SERPL-SCNC: 21 MMOL/L (ref 23–29)
CREAT SERPL-MCNC: 0.9 MG/DL (ref 0.5–1.4)
DIFFERENTIAL METHOD: ABNORMAL
EOSINOPHIL # BLD AUTO: 0.4 K/UL (ref 0–0.5)
EOSINOPHIL NFR BLD: 5.1 % (ref 0–8)
ERYTHROCYTE [DISTWIDTH] IN BLOOD BY AUTOMATED COUNT: 18 % (ref 11.5–14.5)
EST. GFR  (AFRICAN AMERICAN): >60 ML/MIN/1.73 M^2
EST. GFR  (NON AFRICAN AMERICAN): >60 ML/MIN/1.73 M^2
GLUCOSE SERPL-MCNC: 110 MG/DL (ref 70–110)
HCT VFR BLD AUTO: 28 % (ref 37–48.5)
HGB BLD-MCNC: 9.1 G/DL (ref 12–16)
IMM GRANULOCYTES # BLD AUTO: 0.2 K/UL (ref 0–0.04)
IMM GRANULOCYTES NFR BLD AUTO: 2.4 % (ref 0–0.5)
INR PPP: 1.1 (ref 0.8–1.2)
LYMPHOCYTES # BLD AUTO: 1.9 K/UL (ref 1–4.8)
LYMPHOCYTES NFR BLD: 22.9 % (ref 18–48)
MAGNESIUM SERPL-MCNC: 2 MG/DL (ref 1.6–2.6)
MCH RBC QN AUTO: 30.7 PG (ref 27–31)
MCHC RBC AUTO-ENTMCNC: 32.5 G/DL (ref 32–36)
MCV RBC AUTO: 95 FL (ref 82–98)
MONOCYTES # BLD AUTO: 1 K/UL (ref 0.3–1)
MONOCYTES NFR BLD: 12.3 % (ref 4–15)
NEUTROPHILS # BLD AUTO: 4.7 K/UL (ref 1.8–7.7)
NEUTROPHILS NFR BLD: 57.1 % (ref 38–73)
NRBC BLD-RTO: 0 /100 WBC
PHOSPHATE SERPL-MCNC: 3 MG/DL (ref 2.7–4.5)
PLATELET # BLD AUTO: 225 K/UL (ref 150–350)
PMV BLD AUTO: 14.1 FL (ref 9.2–12.9)
POTASSIUM SERPL-SCNC: 3.6 MMOL/L (ref 3.5–5.1)
PROT SERPL-MCNC: 6.1 G/DL (ref 6–8.4)
PROTHROMBIN TIME: 11.7 SEC (ref 9–12.5)
RBC # BLD AUTO: 2.96 M/UL (ref 4–5.4)
SODIUM SERPL-SCNC: 139 MMOL/L (ref 136–145)
WBC # BLD AUTO: 8.28 K/UL (ref 3.9–12.7)

## 2020-02-01 PROCEDURE — 20600001 HC STEP DOWN PRIVATE ROOM

## 2020-02-01 PROCEDURE — 36415 COLL VENOUS BLD VENIPUNCTURE: CPT

## 2020-02-01 PROCEDURE — 99233 SBSQ HOSP IP/OBS HIGH 50: CPT | Mod: ,,, | Performed by: HOSPITALIST

## 2020-02-01 PROCEDURE — 63600175 PHARM REV CODE 636 W HCPCS: Performed by: STUDENT IN AN ORGANIZED HEALTH CARE EDUCATION/TRAINING PROGRAM

## 2020-02-01 PROCEDURE — 99233 PR SUBSEQUENT HOSPITAL CARE,LEVL III: ICD-10-PCS | Mod: ,,, | Performed by: HOSPITALIST

## 2020-02-01 PROCEDURE — 85610 PROTHROMBIN TIME: CPT

## 2020-02-01 PROCEDURE — 80053 COMPREHEN METABOLIC PANEL: CPT

## 2020-02-01 PROCEDURE — 25000003 PHARM REV CODE 250: Performed by: STUDENT IN AN ORGANIZED HEALTH CARE EDUCATION/TRAINING PROGRAM

## 2020-02-01 PROCEDURE — 85025 COMPLETE CBC W/AUTO DIFF WBC: CPT

## 2020-02-01 PROCEDURE — 83735 ASSAY OF MAGNESIUM: CPT

## 2020-02-01 PROCEDURE — 84100 ASSAY OF PHOSPHORUS: CPT

## 2020-02-01 RX ORDER — SODIUM CHLORIDE, SODIUM LACTATE, POTASSIUM CHLORIDE, CALCIUM CHLORIDE 600; 310; 30; 20 MG/100ML; MG/100ML; MG/100ML; MG/100ML
INJECTION, SOLUTION INTRAVENOUS CONTINUOUS
Status: ACTIVE | OUTPATIENT
Start: 2020-02-01 | End: 2020-02-01

## 2020-02-01 RX ORDER — CALCIUM CARBONATE 200(500)MG
500 TABLET,CHEWABLE ORAL DAILY PRN
Status: DISCONTINUED | OUTPATIENT
Start: 2020-02-01 | End: 2020-02-06 | Stop reason: HOSPADM

## 2020-02-01 RX ADMIN — ONDANSETRON 8 MG: 8 TABLET, ORALLY DISINTEGRATING ORAL at 08:02

## 2020-02-01 RX ADMIN — CALCIUM CARBONATE (ANTACID) CHEW TAB 500 MG 500 MG: 500 CHEW TAB at 12:02

## 2020-02-01 RX ADMIN — SODIUM CHLORIDE, SODIUM LACTATE, POTASSIUM CHLORIDE, AND CALCIUM CHLORIDE: .6; .31; .03; .02 INJECTION, SOLUTION INTRAVENOUS at 01:02

## 2020-02-01 RX ADMIN — HYDROXYZINE HYDROCHLORIDE 25 MG: 25 TABLET, FILM COATED ORAL at 09:02

## 2020-02-01 RX ADMIN — ENOXAPARIN SODIUM 40 MG: 100 INJECTION SUBCUTANEOUS at 05:02

## 2020-02-01 RX ADMIN — HYDROXYZINE HYDROCHLORIDE 25 MG: 25 TABLET, FILM COATED ORAL at 08:02

## 2020-02-01 NOTE — PLAN OF CARE
Problem: Adult Inpatient Plan of Care  Goal: Plan of Care Review  Outcome: Ongoing, Progressing     Address questions and concerns. AAOX4. VVS. LR at 100cc/hr.  CTA to be done.  Hydroxyzine,prn for anxiety. Up to BSC with assistance. Safety maintain. Free from falls. Bed alarm. Call light in reach. Son at bedside. BEN

## 2020-02-01 NOTE — PLAN OF CARE
Pt remains AAOx4, vital signs are stable. Pt complained of feeling anxious, Atarax was administered as MD ordered. Pt also experienced nausea, and acid reflux, Zofran PRN and Tums administered upon request. Pt up with assistance to bedside commode.  Call light and personal belongings within reach.  Plan of care reviewed with patient and son, all questions and concerns were addressed. Will continue to monitor.

## 2020-02-01 NOTE — SUBJECTIVE & OBJECTIVE
Interval History: NAEON. Pt complains of shortness of breath and overall feeling nervous about the implications of her diagnosis. She has decided that she does not want to go home due to her degree of fatigue and would prefer to go to skilled nursing facility/rehab.     Review of Systems   Constitutional: Positive for fatigue. Negative for chills and fever.   Respiratory: Positive for shortness of breath. Negative for cough.    Gastrointestinal: Positive for abdominal pain and nausea. Negative for diarrhea and vomiting.   Genitourinary: Negative for difficulty urinating.     Objective:     Vital Signs (Most Recent):  Temp: 97.4 °F (36.3 °C) (02/01/20 1128)  Pulse: 75 (02/01/20 1128)  Resp: 17 (02/01/20 1128)  BP: (!) 94/57 (02/01/20 1128)  SpO2: 99 % (02/01/20 1128) Vital Signs (24h Range):  Temp:  [97.4 °F (36.3 °C)-99.1 °F (37.3 °C)] 97.4 °F (36.3 °C)  Pulse:  [69-87] 75  Resp:  [16-18] 17  SpO2:  [97 %-100 %] 99 %  BP: ()/(54-62) 94/57     Weight: 95.7 kg (210 lb 15.7 oz)  Body mass index is 34.05 kg/m².    Intake/Output Summary (Last 24 hours) at 2/1/2020 1314  Last data filed at 2/1/2020 1308  Gross per 24 hour   Intake 700 ml   Output 1700 ml   Net -1000 ml      Physical Exam   Constitutional: She is oriented to person, place, and time. No distress.   HENT:   Head: Normocephalic and atraumatic.   Mouth/Throat: Oropharynx is clear and moist. No oropharyngeal exudate.   Eyes: Conjunctivae are normal. Scleral icterus is present.   Cardiovascular: Intact distal pulses.   Pulmonary/Chest: Effort normal. No respiratory distress.   Abdominal: Soft. Bowel sounds are normal. She exhibits no distension. There is no tenderness. There is no guarding.   Healing laproscopic incisions.   Musculoskeletal: She exhibits edema. She exhibits no tenderness.   Neurological: She is alert and oriented to person, place, and time.   No asterixis.   Skin: Skin is warm and dry. She is not diaphoretic.   Jaundiced   Psychiatric: She  has a normal mood and affect. Her behavior is normal.   Nursing note and vitals reviewed.      Significant Labs: All pertinent labs within the past 24 hours have been reviewed.    Significant Imaging: I have reviewed and interpreted all pertinent imaging results/findings within the past 24 hours.

## 2020-02-01 NOTE — ASSESSMENT & PLAN NOTE
-PT/OT consulted on admission.   -Recommendations for rehab placement.     Plan:  -Pending rehab placement.

## 2020-02-01 NOTE — PROGRESS NOTES
[facility] Hospital Medicine  Progress Note    Patient Name: Albania Richter  MRN: [id number]  Patient Class: IP- Inpatient   Admission Date: 1/26/2020  Length of Stay: 6 days  Attending Physician: Zheng Thompson MD  Primary Care Provider: John Acosta Jr, MD    Hospital Medicine Team: [facility] HOSP MED 4 Sary Arango MD    Subjective:     Principal Problem:Liver injury        HPI:  Albania Richter is a 54 year old female with history of fatty liver disease who presents from [facility] for acute liver injury and hepatology evaluation.    She initially presented to the OSH after 1 week of abdominal pain and increased fatigue. She denies fever or chills, nausea or vomiting. She denies confusion. Her only medications ibuprofen and extra strength tylenol (x 2 q4hrs) which she states she only took occasionally for headache. She denies smoking, alcohol, and illicit drug use. She does not have a family history of liver disease or autoimmune disease.     At the OSH, she was BP 90/60's and afebrile on presentation. She had no leukocytosis. Labs were significant for Tbili 4.5, AST/ALT 1662/1831, and alk phos 247. CT scan showed inflamed gallbladder and concerns for acalculous cholecystitis. Surgery was consulted and performed Lap cholecystomy the following day. Intraoperative cholangiogram was negative. HIDA later found to be negative. Subsequent labs on 1/25 were significant for HCV Ab positive. On 1/24, she was started on IV acetylcysteine at the recommendation of GI due to a history of increased Tylenol intake prior to arrival (taking 8-500mg Tylenol daily for 4 days prior to admission, apap level 2.4) which was noted to be complete per transfer note.  .    Transfer to [facility] was requested for further Hepatology evaluation.    Overview/Hospital Course:  Ms. Albania Richter was admitted to hospital medicine on 01/26/20 as a transfer for hepatology evaluation for acute liver  injury. Upon admission she was found to have AST/ALT of 1437 and 1401, with hyperbilirubinemia (24.8). Her Tylenol level was negative on admission, however NAC was continued due to reported history of prior Tylenol use. She was also started on Lactulose due to complaints of extreme fatigue and an elevated ammonia of 86 on admission. She was also initiated on Ceftriaxone for possible underlying infectious etiology; discontinued on 01/30 without evidence of infection. She also received three days of Vitamin K supplementation. Hepatology was consulted on admission and subsequent workup was positive for HepC with moderate HCV viral load (>8 mil), however unclear if representative of acute infection. Subsequent workup was negative for HIV, Hep A and B, HSV and underlying Sumit's disease. 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 On 01/29, NAC was discontinued as LFTs continued to trend down to less than 1K. On 01/30, she had an ultrasound guided liver biopsy to help with further clarification of underlying etiology; results pending. PT/OT evaluated patient on 01/30 with recommendations for rehab; she is pending possible placement versus discharge with home health. She is being continuously monitoring for further decline in liver function.     Interval History: NAEON. Pt complains of shortness of breath and overall feeling nervous about the implications of her diagnosis. LFTs continue to trend down. She has decided that she does not want to go home due to her degree of fatigue and would prefer to go to skilled nursing facility/rehab.     Review of Systems   Constitutional: Positive for fatigue. Negative for chills and fever.   Respiratory: Positive for shortness of breath. Negative for cough.    Gastrointestinal: Positive for abdominal pain and nausea. Negative for diarrhea and vomiting.   Genitourinary: Negative for difficulty urinating.     Objective:     Vital Signs (Most  Recent):  Temp: 97.4 °F (36.3 °C) (02/01/20 1128)  Pulse: 75 (02/01/20 1128)  Resp: 17 (02/01/20 1128)  BP: (!) 94/57 (02/01/20 1128)  SpO2: 99 % (02/01/20 1128) Vital Signs (24h Range):  Temp:  [97.4 °F (36.3 °C)-99.1 °F (37.3 °C)] 97.4 °F (36.3 °C)  Pulse:  [69-87] 75  Resp:  [16-18] 17  SpO2:  [97 %-100 %] 99 %  BP: ()/(54-62) 94/57     Weight: 95.7 kg (210 lb 15.7 oz)  Body mass index is 34.05 kg/m².    Intake/Output Summary (Last 24 hours) at 2/1/2020 1314  Last data filed at 2/1/2020 1308  Gross per 24 hour   Intake 700 ml   Output 1700 ml   Net -1000 ml      Physical Exam   Constitutional: She is oriented to person, place, and time. No distress.   HENT:   Head: Normocephalic and atraumatic.   Mouth/Throat: Oropharynx is clear and moist. No oropharyngeal exudate.   Eyes: Conjunctivae are normal. Scleral icterus is present.   Cardiovascular: Intact distal pulses.   Pulmonary/Chest: Effort normal. No respiratory distress.   Abdominal: Soft. Bowel sounds are normal. She exhibits no distension. There is no tenderness. There is no guarding.   Healing laproscopic incisions.   Musculoskeletal: She exhibits edema. She exhibits no tenderness.   Neurological: She is alert and oriented to person, place, and time.   No asterixis.   Skin: Skin is warm and dry. She is not diaphoretic.   Jaundiced   Psychiatric: She has a normal mood and affect. Her behavior is normal.   Nursing note and vitals reviewed.      Significant Labs: All pertinent labs within the past 24 hours have been reviewed.    Significant Imaging: I have reviewed and interpreted all pertinent imaging results/findings within the past 24 hours.      Assessment/Plan:      * Acute Liver Injury  This is a 54 year old female with PMH suggestive of non-alcoholic fatty liver disease who is status post admission on 01/26 as a transfer for acute liver injury (AST, ALT >1000) of undetermined etiology associated with almost two week duration of preceding  generalized fatigue, nausea, and vomiting with remote prior history of Tylenol usage following symptom onset. Initial presentation at OSH reported suggestive of possible cholecystitis, however status post lap.cholecystectomy and intra-operative cholangiogram on 01/23 and HIDA scan (reportedly normal) without improvement in liver function. Tylenol level on admission here negative, but status post NAC until 01/29 with slight improvement, in addition to three days of Ceftriaxone and Vitamin K. Status post hepatology evaluation on admission with work-up thus far significant for underlying Hepatitis C (unclear if acute or chronic infection) and positive anti-smooth muscle antibodies. Liver biopsy results from 01/30 pending. Etiology of presentation unclear; differential diagnoses include secondary to Tylenol toxicity versus autoimmune process versus Hepatitis C.     Plan:  -Continue to trend liver function daily.   -Follow-up liver biopsy results.   -Continue supportive care with IV hydration as necessary.   -Hydroxyzine PRN ordered for pruritis and anxiety.   -Avoid all Tylenol base products and minimize use of opioids in order to not precipitate encephalopathy.   -Hepatology referral as outpatient.  -Disposition: rehab. Case management aware, pending placement.     Debility  -PT/OT consulted on admission.   -Recommendations for rehab placement.     Plan:  -Pending rehab placement.       Hypokalemia  Plan:   -Electrolyte repletion ordered PRN.     Normocytic anemia  -Iron studies on admission suggestive of possible anemia of chronic disease, however may be confounded by acute liver injury.       Hypophosphatemia  Plan:   -Electrolyte repletion ordered PRN.       Chronic hepatitis C without hepatic coma  -See assessment for acute liver injury.         VTE Risk Mitigation (From admission, onward)         Ordered     enoxaparin injection 40 mg  Daily      01/27/20 0127     IP VTE HIGH RISK PATIENT  Once      01/26/20 5548      Reason for No Pharmacological VTE Prophylaxis  Once     Question:  Reasons:  Answer:  Risk of Bleeding    01/26/20 4039                      Sary Arango MD  Department of Hospital Medicine   [facility]

## 2020-02-01 NOTE — ASSESSMENT & PLAN NOTE
This is a 54 year old female with PMH suggestive of non-alcoholic fatty liver disease who is status post admission on 01/26 as a transfer for acute liver injury (AST, ALT >1000) of undetermined etiology associated with almost two week duration of preceding generalized fatigue, nausea, and vomiting with remote prior history of Tylenol usage following symptom onset. Initial presentation at OSH reported suggestive of possible cholecystitis, however status post lap.cholecystectomy and intra-operative cholangiogram on 01/23 and HIDA scan (reportedly normal) without improvement in liver function. Tylenol level on admission here negative, but status post NAC until 01/29 with slight improvement, in addition to three days of Ceftriaxone and Vitamin K. Status post hepatology evaluation on admission with work-up thus far significant for underlying Hepatitis C (unclear if acute or chronic infection) and positive anti-smooth muscle antibodies. Liver biopsy results from 01/30 pending. Etiology of presentation unclear; differential diagnoses include secondary to Tylenol toxicity versus autoimmune process versus Hepatitis C.     Plan:  -Continue to trend liver function daily.   -Follow-up liver biopsy results.   -Continue supportive care with IV hydration as necessary.   -Hydroxyzine PRN ordered for pruritis and anxiety.   -Avoid all Tylenol base products and minimize use of opioids in order to not precipitate encephalopathy.   -Hepatology referral as outpatient.  -Disposition: rehab. Case management aware, pending placement.

## 2020-02-02 LAB
ALBUMIN SERPL BCP-MCNC: 2.3 G/DL (ref 3.5–5.2)
ALP SERPL-CCNC: 162 U/L (ref 55–135)
ALT SERPL W/O P-5'-P-CCNC: 703 U/L (ref 10–44)
ANION GAP SERPL CALC-SCNC: 9 MMOL/L (ref 8–16)
AST SERPL-CCNC: 646 U/L (ref 10–40)
BASOPHILS # BLD AUTO: 0.04 K/UL (ref 0–0.2)
BASOPHILS NFR BLD: 0.4 % (ref 0–1.9)
BILIRUB SERPL-MCNC: 11.6 MG/DL (ref 0.1–1)
BUN SERPL-MCNC: 10 MG/DL (ref 6–20)
CALCIUM SERPL-MCNC: 8.6 MG/DL (ref 8.7–10.5)
CHLORIDE SERPL-SCNC: 107 MMOL/L (ref 95–110)
CO2 SERPL-SCNC: 24 MMOL/L (ref 23–29)
CREAT SERPL-MCNC: 1 MG/DL (ref 0.5–1.4)
DIFFERENTIAL METHOD: ABNORMAL
EOSINOPHIL # BLD AUTO: 0.5 K/UL (ref 0–0.5)
EOSINOPHIL NFR BLD: 5.2 % (ref 0–8)
ERYTHROCYTE [DISTWIDTH] IN BLOOD BY AUTOMATED COUNT: 18.8 % (ref 11.5–14.5)
EST. GFR  (AFRICAN AMERICAN): >60 ML/MIN/1.73 M^2
EST. GFR  (NON AFRICAN AMERICAN): >60 ML/MIN/1.73 M^2
GLUCOSE SERPL-MCNC: 87 MG/DL (ref 70–110)
HCT VFR BLD AUTO: 28.5 % (ref 37–48.5)
HGB BLD-MCNC: 9.2 G/DL (ref 12–16)
IMM GRANULOCYTES # BLD AUTO: 0.19 K/UL (ref 0–0.04)
IMM GRANULOCYTES NFR BLD AUTO: 2.1 % (ref 0–0.5)
INR PPP: 1.1 (ref 0.8–1.2)
LYMPHOCYTES # BLD AUTO: 2.5 K/UL (ref 1–4.8)
LYMPHOCYTES NFR BLD: 27.5 % (ref 18–48)
MAGNESIUM SERPL-MCNC: 2 MG/DL (ref 1.6–2.6)
MCH RBC QN AUTO: 30.8 PG (ref 27–31)
MCHC RBC AUTO-ENTMCNC: 32.3 G/DL (ref 32–36)
MCV RBC AUTO: 95 FL (ref 82–98)
MONOCYTES # BLD AUTO: 1 K/UL (ref 0.3–1)
MONOCYTES NFR BLD: 11.1 % (ref 4–15)
NEUTROPHILS # BLD AUTO: 4.9 K/UL (ref 1.8–7.7)
NEUTROPHILS NFR BLD: 53.7 % (ref 38–73)
NRBC BLD-RTO: 0 /100 WBC
PHOSPHATE SERPL-MCNC: 3.5 MG/DL (ref 2.7–4.5)
PLATELET # BLD AUTO: 235 K/UL (ref 150–350)
PMV BLD AUTO: 13.9 FL (ref 9.2–12.9)
POTASSIUM SERPL-SCNC: 3.6 MMOL/L (ref 3.5–5.1)
PROT SERPL-MCNC: 6.2 G/DL (ref 6–8.4)
PROTHROMBIN TIME: 11.1 SEC (ref 9–12.5)
RBC # BLD AUTO: 2.99 M/UL (ref 4–5.4)
SODIUM SERPL-SCNC: 140 MMOL/L (ref 136–145)
WBC # BLD AUTO: 9.06 K/UL (ref 3.9–12.7)

## 2020-02-02 PROCEDURE — 99232 SBSQ HOSP IP/OBS MODERATE 35: CPT | Mod: ,,, | Performed by: HOSPITALIST

## 2020-02-02 PROCEDURE — 80053 COMPREHEN METABOLIC PANEL: CPT

## 2020-02-02 PROCEDURE — 84100 ASSAY OF PHOSPHORUS: CPT

## 2020-02-02 PROCEDURE — 36415 COLL VENOUS BLD VENIPUNCTURE: CPT

## 2020-02-02 PROCEDURE — 63600175 PHARM REV CODE 636 W HCPCS: Performed by: STUDENT IN AN ORGANIZED HEALTH CARE EDUCATION/TRAINING PROGRAM

## 2020-02-02 PROCEDURE — 20600001 HC STEP DOWN PRIVATE ROOM

## 2020-02-02 PROCEDURE — 85025 COMPLETE CBC W/AUTO DIFF WBC: CPT

## 2020-02-02 PROCEDURE — 63600175 PHARM REV CODE 636 W HCPCS: Performed by: HOSPITALIST

## 2020-02-02 PROCEDURE — 85610 PROTHROMBIN TIME: CPT

## 2020-02-02 PROCEDURE — 99232 PR SUBSEQUENT HOSPITAL CARE,LEVL II: ICD-10-PCS | Mod: ,,, | Performed by: HOSPITALIST

## 2020-02-02 PROCEDURE — 25500020 PHARM REV CODE 255: Performed by: HOSPITALIST

## 2020-02-02 PROCEDURE — 25000003 PHARM REV CODE 250: Performed by: STUDENT IN AN ORGANIZED HEALTH CARE EDUCATION/TRAINING PROGRAM

## 2020-02-02 PROCEDURE — 83735 ASSAY OF MAGNESIUM: CPT

## 2020-02-02 RX ORDER — FUROSEMIDE 10 MG/ML
20 INJECTION INTRAMUSCULAR; INTRAVENOUS ONCE
Status: COMPLETED | OUTPATIENT
Start: 2020-02-02 | End: 2020-02-02

## 2020-02-02 RX ADMIN — IOHEXOL 100 ML: 350 INJECTION, SOLUTION INTRAVENOUS at 05:02

## 2020-02-02 RX ADMIN — FUROSEMIDE 20 MG: 10 INJECTION, SOLUTION INTRAMUSCULAR; INTRAVENOUS at 09:02

## 2020-02-02 RX ADMIN — ENOXAPARIN SODIUM 40 MG: 100 INJECTION SUBCUTANEOUS at 05:02

## 2020-02-02 RX ADMIN — HYDROXYZINE HYDROCHLORIDE 25 MG: 25 TABLET, FILM COATED ORAL at 04:02

## 2020-02-02 RX ADMIN — KETOROLAC TROMETHAMINE 10 MG: 10 TABLET, FILM COATED ORAL at 01:02

## 2020-02-02 NOTE — ASSESSMENT & PLAN NOTE
This is a 54 year old female with PMH suggestive of non-alcoholic fatty liver disease who is status post admission on 01/26 as a transfer for acute liver injury (AST, ALT >1000) of undetermined etiology associated with almost two week duration of preceding generalized fatigue, nausea, and vomiting with remote prior history of Tylenol usage following symptom onset. Initial presentation at OSH reported suggestive of possible cholecystitis, however status post lap.cholecystectomy and intra-operative cholangiogram on 01/23 and HIDA scan (reportedly normal) without improvement in liver function. Tylenol level on admission here negative, but status post NAC until 01/29 with slight improvement, in addition to three days of Ceftriaxone and Vitamin K. Status post hepatology evaluation on admission with work-up thus far significant for underlying Hepatitis C (unclear if acute or chronic infection) and positive anti-smooth muscle antibodies. Liver biopsy results from 01/30 pending. Etiology of presentation unclear; differential diagnoses include secondary to Tylenol toxicity versus autoimmune process versus Hepatitis C.     Plan:  -Continue to trend liver function daily.   -Follow-up liver biopsy results.   -SOB likely due to atelectasis 2/2 limited physical activity 2/2 fatigue. Will rule out PE with CTA.   -Continue supportive care with IV hydration as necessary.   -Hydroxyzine PRN ordered for pruritis and anxiety.   -Avoid all Tylenol base products and minimize use of opioids in order to not precipitate encephalopathy.   -Hepatology referral as outpatient.  -Disposition: rehab. Case management aware, pending placement.

## 2020-02-02 NOTE — PROGRESS NOTES
[facility] Hospital Medicine  Progress Note    Patient Name: Albania Richter  MRN: [id number]  Patient Class: IP- Inpatient   Admission Date: 1/26/2020  Length of Stay: 7 days  Attending Physician: Zheng Thompson MD  Primary Care Provider: John Acosta Jr, MD    Hospital Medicine Team: [facility] [place] [facility]OSP MED 4 Sary Arango MD    Subjective:     Principal Problem:Liver injury        HPI:  Albania Richter is a 54 year old female with history of fatty liver disease who presents from [facility] for acute liver injury and hepatology evaluation.    She initially presented to the OSH after 1 week of abdominal pain and increased fatigue. She denies fever or chills, nausea or vomiting. She denies confusion. Her only medications ibuprofen and extra strength tylenol (x 2 q4hrs) which she states she only took occasionally for headache. She denies smoking, alcohol, and illicit drug use. She does not have a family history of liver disease or autoimmune disease.     At the OSH, she was BP 90/60's and afebrile on presentation. She had no leukocytosis. Labs were significant for Tbili 4.5, AST/ALT 1662/1831, and alk phos 247. CT scan showed inflamed gallbladder and concerns for acalculous cholecystitis. Surgery was consulted and performed Lap cholecystomy the following day. Intraoperative cholangiogram was negative. HIDA later found to be negative. Subsequent labs on 1/25 were significant for HCV Ab positive. On 1/24, she was started on IV acetylcysteine at the recommendation of GI due to a history of increased Tylenol intake prior to arrival (taking 8-500mg Tylenol daily for 4 days prior to admission, apap level 2.4) which was noted to be complete per transfer note.  .    Transfer to [facility] [place] was requested for further Hepatology evaluation.    Overview/Hospital Course:  Ms. Albania Richter was admitted to hospital medicine on 01/26/20 as a transfer for hepatology evaluation for acute liver  injury. Upon admission she was found to have AST/ALT of 1437 and 1401, with hyperbilirubinemia (24.8). Her Tylenol level was negative on admission, however NAC was continued due to reported history of prior Tylenol use. She was also started on Lactulose due to complaints of extreme fatigue and an elevated ammonia of 86 on admission. She was also initiated on Ceftriaxone for possible underlying infectious etiology; discontinued on 01/30 without evidence of infection. She also received three days of Vitamin K supplementation. Hepatology was consulted on admission and subsequent workup was positive for HepC with moderate HCV viral load (>8 mil), however unclear if representative of acute infection. Subsequent workup was negative for HIV, Hep A and B, HSV and underlying Sumit's disease. 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 On 01/29, NAC was discontinued as LFTs continued to trend down to less than 1K. On 01/30, she had an ultrasound guided liver biopsy to help with further clarification of underlying etiology; results pending. PT/OT evaluated patient on 01/30 with recommendations for rehab; she is pending possible placement versus discharge with home health. She is being continuously monitoring for further decline in liver function.     Interval History: Pt reports intermittent shortness of breath improved after hydroxyzine. Vitals stable, saturating 100% on 2L. CTA ordered to rule out PE, pt refused examination due to wait time.  LFTs continue to trend down.     Review of Systems  Objective:     Vital Signs (Most Recent):  Temp: 97.9 °F (36.6 °C) (02/02/20 0733)  Pulse: 72 (02/02/20 0733)  Resp: 18 (02/02/20 0733)  BP: (!) 98/56 (02/02/20 0733)  SpO2: 100 % (02/02/20 0733) Vital Signs (24h Range):  Temp:  [97 °F (36.1 °C)-98.5 °F (36.9 °C)] 97.9 °F (36.6 °C)  Pulse:  [61-75] 72  Resp:  [17-18] 18  SpO2:  [97 %-100 %] 100 %  BP: ()/(50-63) 98/56     Weight: 95.7 kg  (210 lb 15.7 oz)  Body mass index is 34.05 kg/m².    Intake/Output Summary (Last 24 hours) at 2/2/2020 0906  Last data filed at 2/2/2020 0600  Gross per 24 hour   Intake 480 ml   Output 2000 ml   Net -1520 ml      Physical Exam   Constitutional: She is oriented to person, place, and time. No distress.   HENT:   Head: Normocephalic and atraumatic.   Mouth/Throat: Oropharynx is clear and moist. No oropharyngeal exudate.   Eyes: Conjunctivae are normal. Scleral icterus is present.   Cardiovascular: Intact distal pulses.   Pulmonary/Chest: Effort normal. No respiratory distress.   Poor inspiratory effort 2/2 fatigue.    Abdominal: Soft. Bowel sounds are normal. She exhibits no distension. There is no tenderness. There is no guarding.   Healing laproscopic incisions.   Musculoskeletal: She exhibits edema. She exhibits no tenderness.   Neurological: She is alert and oriented to person, place, and time.   Skin: Skin is warm and dry. She is not diaphoretic.   Jaundiced   Psychiatric: She has a normal mood and affect. Her behavior is normal.   Nursing note and vitals reviewed.      Significant Labs: All pertinent labs within the past 24 hours have been reviewed.    Significant Imaging: I have reviewed and interpreted all pertinent imaging results/findings within the past 24 hours.      Assessment/Plan:      * Acute Liver Injury  This is a 54 year old female with PMH suggestive of non-alcoholic fatty liver disease who is status post admission on 01/26 as a transfer for acute liver injury (AST, ALT >1000) of undetermined etiology associated with almost two week duration of preceding generalized fatigue, nausea, and vomiting with remote prior history of Tylenol usage following symptom onset. Initial presentation at OSH reported suggestive of possible cholecystitis, however status post lap.cholecystectomy and intra-operative cholangiogram on 01/23 and HIDA scan (reportedly normal) without improvement in liver function. Tylenol  level on admission here negative, but status post NAC until 01/29 with slight improvement, in addition to three days of Ceftriaxone and Vitamin K. Status post hepatology evaluation on admission with work-up thus far significant for underlying Hepatitis C (unclear if acute or chronic infection) and positive anti-smooth muscle antibodies. Liver biopsy results from 01/30 pending. Etiology of presentation unclear; differential diagnoses include secondary to Tylenol toxicity versus autoimmune process versus Hepatitis C.     Plan:  -Continue to trend liver function daily.   -Follow-up liver biopsy results.   -SOB likely due to atelectasis 2/2 limited physical activity 2/2 fatigue. Will rule out PE with CTA.   -Continue supportive care with IV hydration as necessary.   -Hydroxyzine PRN ordered for pruritis and anxiety.   -Avoid all Tylenol base products and minimize use of opioids in order to not precipitate encephalopathy.   -Hepatology referral as outpatient.  -Disposition: rehab. Case management aware, pending placement.     Debility  -PT/OT consulted on admission.   -Recommendations for rehab placement.     Plan:  -Pending rehab placement.       Hypokalemia  Plan:   -Electrolyte repletion ordered PRN.     Normocytic anemia  -Iron studies on admission suggestive of possible anemia of chronic disease, however may be confounded by acute liver injury.       Hypophosphatemia  Plan:   -Electrolyte repletion ordered PRN.       Hypoalbuminemia        Chronic hepatitis C without hepatic coma  -See assessment for acute liver injury.         VTE Risk Mitigation (From admission, onward)         Ordered     enoxaparin injection 40 mg  Daily      01/27/20 0127     IP VTE HIGH RISK PATIENT  Once      01/26/20 2351     Reason for No Pharmacological VTE Prophylaxis  Once     Question:  Reasons:  Answer:  Risk of Bleeding    01/26/20 2351                      Sary Arango MD  Department of Hospital Medicine   [facility]

## 2020-02-02 NOTE — PLAN OF CARE
Problem: Adult Inpatient Plan of Care  Goal: Plan of Care Review  Outcome: Ongoing, Progressing     Address questions and concerns. AAOX4. VVS. Up to bathroom with assistance tolerated well. Remain on Oxygen at 2L N/C.  CTA done. No c/o of discomfort/pain. Safety maintain. Free from falls. Bed alarm. [facility]

## 2020-02-02 NOTE — NURSING
Pt refused to go for CTA because transport was taking too long and pt was really hungry.  She asked to be rescheduled for tomorrow.

## 2020-02-02 NOTE — NURSING
Team notified of pt stating she doesn't feel well. Pt states she feels like she can't breathe. Vital signs are stable. Pt is saturating 98% on 2L O2. Ordered to give dose of Atarax.

## 2020-02-02 NOTE — SUBJECTIVE & OBJECTIVE
Interval History: Pt reports intermittent shortness of breath improved after hydroxyzine. Vitals stable, saturating 100% on 2L. CTA ordered to rule out PE, pt refused examination due to wait time.  LFTs continue to trend down.     Review of Systems  Objective:     Vital Signs (Most Recent):  Temp: 97.9 °F (36.6 °C) (02/02/20 0733)  Pulse: 72 (02/02/20 0733)  Resp: 18 (02/02/20 0733)  BP: (!) 98/56 (02/02/20 0733)  SpO2: 100 % (02/02/20 0733) Vital Signs (24h Range):  Temp:  [97 °F (36.1 °C)-98.5 °F (36.9 °C)] 97.9 °F (36.6 °C)  Pulse:  [61-75] 72  Resp:  [17-18] 18  SpO2:  [97 %-100 %] 100 %  BP: ()/(50-63) 98/56     Weight: 95.7 kg (210 lb 15.7 oz)  Body mass index is 34.05 kg/m².    Intake/Output Summary (Last 24 hours) at 2/2/2020 0906  Last data filed at 2/2/2020 0600  Gross per 24 hour   Intake 480 ml   Output 2000 ml   Net -1520 ml      Physical Exam   Constitutional: She is oriented to person, place, and time. No distress.   HENT:   Head: Normocephalic and atraumatic.   Mouth/Throat: Oropharynx is clear and moist. No oropharyngeal exudate.   Eyes: Conjunctivae are normal. Scleral icterus is present.   Cardiovascular: Intact distal pulses.   Pulmonary/Chest: Effort normal. No respiratory distress.   Poor inspiratory effort 2/2 fatigue.    Abdominal: Soft. Bowel sounds are normal. She exhibits no distension. There is no tenderness. There is no guarding.   Healing laproscopic incisions.   Musculoskeletal: She exhibits edema. She exhibits no tenderness.   Neurological: She is alert and oriented to person, place, and time.   Skin: Skin is warm and dry. She is not diaphoretic.   Jaundiced   Psychiatric: She has a normal mood and affect. Her behavior is normal.   Nursing note and vitals reviewed.      Significant Labs: All pertinent labs within the past 24 hours have been reviewed.    Significant Imaging: I have reviewed and interpreted all pertinent imaging results/findings within the past 24 hours.

## 2020-02-02 NOTE — PLAN OF CARE
Pt remains AAOx4, vital signs are stable. Call light and personal belongings within reach. Pt complained of anxiety, Atarax times 2 administered during shift. Pt also complained of headache, Toradol x1 administered with full relief. Son at bedside. Bed alarm on. CTA to be in the morning.Plan of care reviewed with pt and son, all questions and concerns were addressed. Will continue to monitor.

## 2020-02-03 ENCOUNTER — TELEPHONE (OUTPATIENT)
Dept: TRANSPLANT | Facility: CLINIC | Age: 55
End: 2020-02-03

## 2020-02-03 ENCOUNTER — TELEPHONE (OUTPATIENT)
Dept: HEPATOLOGY | Facility: CLINIC | Age: 55
End: 2020-02-03

## 2020-02-03 LAB
ALBUMIN SERPL BCP-MCNC: 2.5 G/DL (ref 3.5–5.2)
ALP SERPL-CCNC: 172 U/L (ref 55–135)
ALT SERPL W/O P-5'-P-CCNC: 611 U/L (ref 10–44)
ANION GAP SERPL CALC-SCNC: 12 MMOL/L (ref 8–16)
AST SERPL-CCNC: 521 U/L (ref 10–40)
BASOPHILS # BLD AUTO: 0.03 K/UL (ref 0–0.2)
BASOPHILS NFR BLD: 0.3 % (ref 0–1.9)
BILIRUB SERPL-MCNC: 11.5 MG/DL (ref 0.1–1)
BUN SERPL-MCNC: 9 MG/DL (ref 6–20)
CALCIUM SERPL-MCNC: 8.7 MG/DL (ref 8.7–10.5)
CHLORIDE SERPL-SCNC: 105 MMOL/L (ref 95–110)
CO2 SERPL-SCNC: 23 MMOL/L (ref 23–29)
CREAT SERPL-MCNC: 1 MG/DL (ref 0.5–1.4)
DIFFERENTIAL METHOD: ABNORMAL
EOSINOPHIL # BLD AUTO: 0.5 K/UL (ref 0–0.5)
EOSINOPHIL NFR BLD: 5.2 % (ref 0–8)
ERYTHROCYTE [DISTWIDTH] IN BLOOD BY AUTOMATED COUNT: 19.5 % (ref 11.5–14.5)
EST. GFR  (AFRICAN AMERICAN): >60 ML/MIN/1.73 M^2
EST. GFR  (NON AFRICAN AMERICAN): >60 ML/MIN/1.73 M^2
GLUCOSE SERPL-MCNC: 145 MG/DL (ref 70–110)
HCT VFR BLD AUTO: 30.9 % (ref 37–48.5)
HGB BLD-MCNC: 9.7 G/DL (ref 12–16)
IMM GRANULOCYTES # BLD AUTO: 0.11 K/UL (ref 0–0.04)
IMM GRANULOCYTES NFR BLD AUTO: 1.2 % (ref 0–0.5)
INR PPP: 1.1 (ref 0.8–1.2)
LYMPHOCYTES # BLD AUTO: 2.1 K/UL (ref 1–4.8)
LYMPHOCYTES NFR BLD: 23.2 % (ref 18–48)
MAGNESIUM SERPL-MCNC: 1.9 MG/DL (ref 1.6–2.6)
MCH RBC QN AUTO: 30.4 PG (ref 27–31)
MCHC RBC AUTO-ENTMCNC: 31.4 G/DL (ref 32–36)
MCV RBC AUTO: 97 FL (ref 82–98)
MONOCYTES # BLD AUTO: 1 K/UL (ref 0.3–1)
MONOCYTES NFR BLD: 10.6 % (ref 4–15)
NEUTROPHILS # BLD AUTO: 5.5 K/UL (ref 1.8–7.7)
NEUTROPHILS NFR BLD: 59.5 % (ref 38–73)
NRBC BLD-RTO: 0 /100 WBC
PHOSPHATE SERPL-MCNC: 3.1 MG/DL (ref 2.7–4.5)
PLATELET # BLD AUTO: 255 K/UL (ref 150–350)
PMV BLD AUTO: 14.5 FL (ref 9.2–12.9)
POTASSIUM SERPL-SCNC: 3.4 MMOL/L (ref 3.5–5.1)
PROT SERPL-MCNC: 6.7 G/DL (ref 6–8.4)
PROTHROMBIN TIME: 11.2 SEC (ref 9–12.5)
RBC # BLD AUTO: 3.19 M/UL (ref 4–5.4)
SODIUM SERPL-SCNC: 140 MMOL/L (ref 136–145)
WBC # BLD AUTO: 9.18 K/UL (ref 3.9–12.7)

## 2020-02-03 PROCEDURE — 25000003 PHARM REV CODE 250: Performed by: STUDENT IN AN ORGANIZED HEALTH CARE EDUCATION/TRAINING PROGRAM

## 2020-02-03 PROCEDURE — 83735 ASSAY OF MAGNESIUM: CPT

## 2020-02-03 PROCEDURE — 20600001 HC STEP DOWN PRIVATE ROOM

## 2020-02-03 PROCEDURE — 84100 ASSAY OF PHOSPHORUS: CPT

## 2020-02-03 PROCEDURE — 97530 THERAPEUTIC ACTIVITIES: CPT | Mod: CQ

## 2020-02-03 PROCEDURE — 63600175 PHARM REV CODE 636 W HCPCS: Performed by: STUDENT IN AN ORGANIZED HEALTH CARE EDUCATION/TRAINING PROGRAM

## 2020-02-03 PROCEDURE — 85025 COMPLETE CBC W/AUTO DIFF WBC: CPT

## 2020-02-03 PROCEDURE — 99232 PR SUBSEQUENT HOSPITAL CARE,LEVL II: ICD-10-PCS | Mod: ,,, | Performed by: HOSPITALIST

## 2020-02-03 PROCEDURE — 97530 THERAPEUTIC ACTIVITIES: CPT

## 2020-02-03 PROCEDURE — 36415 COLL VENOUS BLD VENIPUNCTURE: CPT

## 2020-02-03 PROCEDURE — 85610 PROTHROMBIN TIME: CPT

## 2020-02-03 PROCEDURE — 80053 COMPREHEN METABOLIC PANEL: CPT

## 2020-02-03 PROCEDURE — 94761 N-INVAS EAR/PLS OXIMETRY MLT: CPT

## 2020-02-03 PROCEDURE — 97116 GAIT TRAINING THERAPY: CPT | Mod: CQ

## 2020-02-03 PROCEDURE — 99232 SBSQ HOSP IP/OBS MODERATE 35: CPT | Mod: ,,, | Performed by: HOSPITALIST

## 2020-02-03 RX ORDER — POLYETHYLENE GLYCOL 3350 17 G/17G
17 POWDER, FOR SOLUTION ORAL DAILY
Status: DISCONTINUED | OUTPATIENT
Start: 2020-02-03 | End: 2020-02-06 | Stop reason: HOSPADM

## 2020-02-03 RX ORDER — POTASSIUM CHLORIDE 20 MEQ/1
40 TABLET, EXTENDED RELEASE ORAL ONCE
Status: COMPLETED | OUTPATIENT
Start: 2020-02-03 | End: 2020-02-03

## 2020-02-03 RX ADMIN — ONDANSETRON 8 MG: 8 TABLET, ORALLY DISINTEGRATING ORAL at 12:02

## 2020-02-03 RX ADMIN — POTASSIUM CHLORIDE 40 MEQ: 1500 TABLET, EXTENDED RELEASE ORAL at 09:02

## 2020-02-03 RX ADMIN — ENOXAPARIN SODIUM 40 MG: 100 INJECTION SUBCUTANEOUS at 05:02

## 2020-02-03 RX ADMIN — POLYETHYLENE GLYCOL 3350 17 G: 17 POWDER, FOR SOLUTION ORAL at 09:02

## 2020-02-03 RX ADMIN — HYDROXYZINE HYDROCHLORIDE 25 MG: 25 TABLET, FILM COATED ORAL at 12:02

## 2020-02-03 NOTE — PT/OT/SLP PROGRESS
"Occupational Therapy      Patient Name:  Albnaia Richter   MRN:  [id number]    Occupational therapy visit attempted in PM. Pt eating sandwich while supine with HOB raised. Pt reports she ambulated in room with physical therapy and took shower and ambulated to bathroom for toileting with PCT assistance. Her chief complaint being overall fatigue 2/2 activity this morning. Discussed OT POC and discharge recommendations. Pt stating, " I feel depressed" and reports her feelings are due to being in bed and not performing her normal daily activities. Encouraged pt to sit up in chair during the day with blinds open and/or ask to be transferred to wheelchair and take outside with family for sunlight. Edu pt on pet and music therapy and spiritual care offered at [facility]. Notified RN of pts concerns.Will follow-up as scheduled.   Total time: 13 minutes  Time in and time out: [id number]   One billable charge- therapeutic activity     Kat Arce OT  2/3/2020  "

## 2020-02-03 NOTE — PLAN OF CARE
VS stable overnight.  Patient rested through the night.  Complaints of cramping in her abdomen which was relieved with zofran and warm compress.  She has great urine output.  NAD noted.

## 2020-02-03 NOTE — TELEPHONE ENCOUNTER
----- Message from Casandra HansenchesALEJO zapata sent at 1/31/2020  2:59 PM CST -----  ???  ----- Message -----  From: Kati Garza RN  Sent: 1/31/2020   2:13 PM CST  To: Casandra HansenchesALEJO zapata    What are the different locations?  [place], [place].. And what else [place]    ----- Message -----  From: Casandra Chand MA  Sent: 1/31/2020   1:57 PM CST  To: Kati Garza RN    What location should I schedule these labs? Appt is scheduled.   ----- Message -----  From: Kati Garza RN  Sent: 1/31/2020  11:49 AM CST  To: Alexandria Eason MA, #    Good morning.  Ms. Richter is a new hepatology referral with hepatitis C.  Dr. Mendoza wants her seen in regular hepatology clinic first.  She wants her to have weekly labs for the next month.  She can be seen in a month.  She is not an overly complex patient and does not have a complex medical history.  She can be seen by an [facility] or physician.  I have placed orders for weekly labs.  She lives in the [facility] so labs can be scheduled in the [facility].   Thanks  Jil

## 2020-02-03 NOTE — PLAN OF CARE
Problem: Occupational Therapy Goal  Goal: Occupational Therapy Goal  Description  Goals to be met by: 2/12/20    Patient will increase functional independence with ADLs by performing:    UE Dressing with [place].  LE Dressing with Supervision.  Grooming while standing at sink with Supervision.  Toileting from toilet with Supervision for hygiene and clothing management.   Supine to sit with [place].  Toilet transfer to toilet with Supervision.  Upper extremity exercise program per handout, with independence.     Outcome: Ongoing, Progressing   POC remains appropriate. Continue with POC.   Kat Arce, OT  2/3/2020

## 2020-02-03 NOTE — PLAN OF CARE
Pending acceptance from [facility] in [place]        02/03/20 1544   Discharge Reassessment   Assessment Type Discharge Planning Reassessment   Provided patient/caregiver education on the expected discharge date and the discharge plan Yes   Do you have any problems affording any of your prescribed medications? No   Discharge Plan A Rehab   Discharge Plan B Rehab   DME Needed Upon Discharge  none   Anticipated Discharge Disposition Rehab   Post-Acute Status   Post-Acute Authorization Placement   Post-Acute Placement Status Patient Evaluation by Facility

## 2020-02-03 NOTE — PROGRESS NOTES
[facility] Hospital Medicine  Progress Note    Patient Name: Albania Richter  MRN: [id number]  Patient Class: IP- Inpatient   Admission Date: 1/26/2020  Length of Stay: 8 days  Attending Physician: Zheng Thompson MD  Primary Care Provider: John Acosta Jr, MD    Hospital Medicine Team: [facility] HOSP MED 4 Sary Arango MD    Subjective:     Principal Problem:Liver injury        HPI:  Albania Richter is a 54 year old female with history of fatty liver disease who presents from [facility] for acute liver injury and hepatology evaluation.    She initially presented to the OSH after 1 week of abdominal pain and increased fatigue. She denies fever or chills, nausea or vomiting. She denies confusion. Her only medications ibuprofen and extra strength tylenol (x 2 q4hrs) which she states she only took occasionally for headache. She denies smoking, alcohol, and illicit drug use. She does not have a family history of liver disease or autoimmune disease.     At the OSH, she was BP 90/60's and afebrile on presentation. She had no leukocytosis. Labs were significant for Tbili 4.5, AST/ALT 1662/1831, and alk phos 247. CT scan showed inflamed gallbladder and concerns for acalculous cholecystitis. Surgery was consulted and performed Lap cholecystomy the following day. Intraoperative cholangiogram was negative. HIDA later found to be negative. Subsequent labs on 1/25 were significant for HCV Ab positive. On 1/24, she was started on IV acetylcysteine at the recommendation of GI due to a history of increased Tylenol intake prior to arrival (taking 8-500mg Tylenol daily for 4 days prior to admission, apap level 2.4) which was noted to be complete per transfer note.  .    Transfer to [facility] was requested for further Hepatology evaluation.    Overview/Hospital Course:  Ms. Albania Richter was admitted to hospital medicine on 01/26/20 as a transfer for hepatology evaluation for acute liver  injury. Upon admission she was found to have AST/ALT of 1437 and 1401, with hyperbilirubinemia (24.8). Her Tylenol level was negative on admission, however NAC was continued due to reported history of prior Tylenol use. She was also started on Lactulose due to complaints of extreme fatigue and an elevated ammonia of 86 on admission. She was also initiated on Ceftriaxone for possible underlying infectious etiology; discontinued on 01/30 without evidence of infection. She also received three days of Vitamin K supplementation. Hepatology was consulted on admission and subsequent workup was positive for HepC with moderate HCV viral load (>8 mil), however unclear if representative of acute infection. Subsequent workup was negative for HIV, Hep A and B, HSV and underlying Sumit's disease. 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 On 01/29, NAC was discontinued as LFTs continued to trend down to less than 1K. On 01/30, she had an ultrasound guided liver biopsy to help with further clarification of underlying etiology; results pending. PT/OT evaluated patient on 01/30 with recommendations for rehab; she is pending possible placement versus discharge with home health. She is being continuously monitoring for further decline in liver function.     Interval History: CTA negative for PE, small right sided pleural effusion noted with basilar atelectasis. She reports her SOB has improved since using her incentive spirometer q1hr while awake.     Review of Systems   Constitutional: Positive for fatigue. Negative for chills and fever.   Respiratory: Positive for shortness of breath (improved). Negative for cough.    Gastrointestinal: Positive for constipation and nausea. Negative for abdominal pain, diarrhea and vomiting.   Genitourinary: Negative for difficulty urinating.   Psychiatric/Behavioral: The patient is nervous/anxious.      Objective:     Vital Signs (Most Recent):  Temp: 97.8 °F  (36.6 °C) (02/03/20 0805)  Pulse: 78 (02/03/20 0805)  Resp: 17 (02/03/20 0805)  BP: (!) 96/52 (02/03/20 0805)  SpO2: 95 % (02/03/20 0805) Vital Signs (24h Range):  Temp:  [97.4 °F (36.3 °C)-98.7 °F (37.1 °C)] 97.8 °F (36.6 °C)  Pulse:  [64-78] 78  Resp:  [17-18] 17  SpO2:  [95 %-99 %] 95 %  BP: ()/(50-66) 96/52     Weight: 95.7 kg (210 lb 15.7 oz)  Body mass index is 34.05 kg/m².    Intake/Output Summary (Last 24 hours) at 2/3/2020 1008  Last data filed at 2/3/2020 0900  Gross per 24 hour   Intake 720 ml   Output 2725 ml   Net -2005 ml      Physical Exam   Constitutional: She is oriented to person, place, and time. No distress.   HENT:   Head: Normocephalic and atraumatic.   Mouth/Throat: Oropharynx is clear and moist. No oropharyngeal exudate.   Eyes: Conjunctivae are normal. Scleral icterus is present.   Cardiovascular: Intact distal pulses.   Pulmonary/Chest: Effort normal. No respiratory distress. She has no wheezes.   Improved air entry bilaterally   Abdominal: Soft. Bowel sounds are normal. She exhibits no distension. There is no tenderness. There is no guarding.   Healing laproscopic incisions.   Musculoskeletal: She exhibits edema. She exhibits no tenderness.   Neurological: She is alert and oriented to person, place, and time.   Skin: Skin is warm and dry. She is not diaphoretic.   Jaundiced   Psychiatric: She has a normal mood and affect. Her behavior is normal.   Nursing note and vitals reviewed.      Significant Labs: All pertinent labs within the past 24 hours have been reviewed.    Significant Imaging: I have reviewed and interpreted all pertinent imaging results/findings within the past 24 hours.      Assessment/Plan:      * Acute Liver Injury  This is a 54 year old female with PMH suggestive of non-alcoholic fatty liver disease who is status post admission on 01/26 as a transfer for acute liver injury (AST, ALT >1000) of undetermined etiology associated with almost two week duration of  preceding generalized fatigue, nausea, and vomiting with remote prior history of Tylenol usage following symptom onset. Initial presentation at OSH reported suggestive of possible cholecystitis, however status post lap.cholecystectomy and intra-operative cholangiogram on 01/23 and HIDA scan (reportedly normal) without improvement in liver function. Tylenol level on admission here negative, but status post NAC until 01/29 with slight improvement, in addition to three days of Ceftriaxone and Vitamin K. Status post hepatology evaluation on admission with work-up thus far significant for underlying Hepatitis C (unclear if acute or chronic infection) and positive anti-smooth muscle antibodies. Liver biopsy results from 01/30 pending. Etiology of presentation unclear; differential diagnoses include secondary to Tylenol toxicity versus autoimmune process versus Hepatitis C.     Plan:  -Continue to trend liver function daily.   -Follow-up liver biopsy results.   -Incentive spirometer q1hr for atelectasis prevention.   -Hydroxyzine PRN ordered for pruritis and anxiety.   -Avoid all Tylenol base products and minimize use of opioids in order to not precipitate encephalopathy.   -Hepatology referral as outpatient.  -Disposition: rehab. Case management aware, pending placement.     Debility  -PT/OT consulted on admission.   -Recommendations for rehab placement.     Plan:  -Pending rehab placement.       Hypokalemia  Plan:   -Electrolyte repletion ordered PRN.     Normocytic anemia  -Iron studies on admission suggestive of possible anemia of chronic disease, however may be confounded by acute liver injury.       Hypophosphatemia  Plan:   -Electrolyte repletion ordered PRN.       Hypoalbuminemia        Chronic hepatitis C without hepatic coma  -See assessment for acute liver injury.         VTE Risk Mitigation (From admission, onward)         Ordered     enoxaparin injection 40 mg  Daily      01/27/20 0127     IP VTE HIGH RISK  PATIENT  Once      01/26/20 2351     Reason for No Pharmacological VTE Prophylaxis  Once     Question:  Reasons:  Answer:  Risk of Bleeding    01/26/20 2351                Sary Arango MD  Department of Hospital Medicine   [facility]

## 2020-02-03 NOTE — PLAN OF CARE
02/03/20 1337   Post-Acute Status   Post-Acute Authorization Placement  ([facility] in [place])   Post-Acute Placement Status Referrals Sent   Discharge Delays None known at this time

## 2020-02-03 NOTE — ASSESSMENT & PLAN NOTE
This is a 54 year old female with PMH suggestive of non-alcoholic fatty liver disease who is status post admission on 01/26 as a transfer for acute liver injury (AST, ALT >1000) of undetermined etiology associated with almost two week duration of preceding generalized fatigue, nausea, and vomiting with remote prior history of Tylenol usage following symptom onset. Initial presentation at OSH reported suggestive of possible cholecystitis, however status post lap.cholecystectomy and intra-operative cholangiogram on 01/23 and HIDA scan (reportedly normal) without improvement in liver function. Tylenol level on admission here negative, but status post NAC until 01/29 with slight improvement, in addition to three days of Ceftriaxone and Vitamin K. Status post hepatology evaluation on admission with work-up thus far significant for underlying Hepatitis C (unclear if acute or chronic infection) and positive anti-smooth muscle antibodies. Liver biopsy results from 01/30 pending. Etiology of presentation unclear; differential diagnoses include secondary to Tylenol toxicity versus autoimmune process versus Hepatitis C.     Plan:  -Continue to trend liver function daily.   -Follow-up liver biopsy results.   -Incentive spirometer q1hr for atelectasis prevention.   -Hydroxyzine PRN ordered for pruritis and anxiety.   -Avoid all Tylenol base products and minimize use of opioids in order to not precipitate encephalopathy.   -Hepatology referral as outpatient.  -Disposition: rehab. Case management aware, pending placement.

## 2020-02-03 NOTE — TELEPHONE ENCOUNTER
Message left for patient.  Orders for patient to have labs weekly.  Asked that she call back to state what is closest facility to have labs done weekly near her home.  Phone number left and gave her name Alexandria Iveysusa to ask for.

## 2020-02-03 NOTE — PLAN OF CARE
Problem: Physical Therapy Goal  Goal: Physical Therapy Goal  Description  Goals to be met by: 2020    Patient will increase functional independence with mobility by performin. Supine to sit with [place]  2. Sit to stand transfer with [place]  3. Gait  x 150 feet with [place] using least restrictive AD  4. Lower extremity exercise program x 20 reps per handout, with independence     Outcome: Ongoing, Progressing.  Patient continues to progress very slowly, due to fatigue.

## 2020-02-03 NOTE — PT/OT/SLP PROGRESS
Physical Therapy Treatment    Patient Name:  Albania Richter   MRN:  [id number]    Recommendations:     Discharge Recommendations:  rehabilitation facility   Discharge Equipment Recommendations: walker, rolling   Barriers to discharge: Inaccessible home    Assessment:     Albania Richter is a 54 y.o. female admitted with a medical diagnosis of Liver injury.  She presents with the following impairments/functional limitations:  weakness, impaired endurance, gait instability, decreased safety awareness, impaired balance, impaired cardiopulmonary response to activity, impaired self care skills, impaired functional mobilty . Patient appeared fatigued and somnolent, as she reported that she had not slept well last night due to abdominal cramping. Patient ambulates with unsteady gait pattern, but no loss of balance noted..    Rehab Prognosis: Good; patient would benefit from acute skilled PT services to address these deficits and reach maximum level of function.    Recent Surgery: * No surgery found *      Plan:     During this hospitalization, patient to be seen 4 x/week to address the identified rehab impairments via gait training, therapeutic activities, therapeutic exercises, neuromuscular re-education and progress toward the following goals:    · Plan of Care Expires:  02/28/20    Subjective     Chief Complaint: fatigue and drowsiness.  Patient/Family Comments/goals: to have more energy.  Pain/Comfort:  · Pain Rating 1: 0/10  · Pain Rating Post-Intervention 1: 0/10      Objective:     Communicated with nsg prior to session.  Patient found HOB elevated with bed alarm, telemetry upon PT entry to room.     General Precautions: Standard, fall   Orthopedic Precautions:N/A   Braces: N/A     Functional Mobility:  · Bed Mobility:     · Rolling Left:  stand by assistance  · Scooting: stand by assistance  · Supine to Sit: stand by assistance  · Sit to Supine: stand by assistance  · Transfers:     · Sit to Stand:  minimum assistance  with rolling walker  · Gait: ~16 ft x 3 trials using RW with min to CGA, with long rest breaks in sitting between trials.      AM-PAC 6 CLICK MOBILITY  Turning over in bed (including adjusting bedclothes, sheets and blankets)?: 4  Sitting down on and standing up from a chair with arms (e.g., wheelchair, bedside commode, etc.): 3  Moving from lying on back to sitting on the side of the bed?: 4  Moving to and from a bed to a chair (including a wheelchair)?: 3  Need to walk in hospital room?: 3  Climbing 3-5 steps with a railing?: 2  Basic Mobility Total Score: 19       Therapeutic Activities and Exercises:   Donned/Doffed a gown and gripping socks. Patient sat at EOB to prepare for treatment.    Patient left HOB elevated with all lines intact, call button in reach and bed alarm on..    GOALS:   Multidisciplinary Problems     Physical Therapy Goals        Problem: Physical Therapy Goal    Goal Priority Disciplines Outcome Goal Variances Interventions   Physical Therapy Goal     PT, PT/OT Ongoing, Progressing     Description:  Goals to be met by: 2020    Patient will increase functional independence with mobility by performin. Supine to sit with [place]  2. Sit to stand transfer with [place]  3. Gait  x 150 feet with [place] using least restrictive AD  4. Lower extremity exercise program x 20 reps per handout, with independence                      Time Tracking:     PT Received On: 20  PT Start Time: 1002     PT Stop Time: 1026  PT Total Time (min): 24 min     Billable Minutes: Gait Training 12 and Therapeutic Activity 12    Treatment Type: Treatment  PT/PTA: PTA     PTA Visit Number: Apple Martinez PTA  2020

## 2020-02-03 NOTE — SUBJECTIVE & OBJECTIVE
Interval History: CTA negative for PE, small right sided pleural effusion noted with basilar atelectasis. She reports her SOB has improved since using her incentive spirometer q1hr while awake.     Review of Systems   Constitutional: Positive for fatigue. Negative for chills and fever.   Respiratory: Positive for shortness of breath (improved). Negative for cough.    Gastrointestinal: Positive for constipation and nausea. Negative for abdominal pain, diarrhea and vomiting.   Genitourinary: Negative for difficulty urinating.   Psychiatric/Behavioral: The patient is nervous/anxious.      Objective:     Vital Signs (Most Recent):  Temp: 97.8 °F (36.6 °C) (02/03/20 0805)  Pulse: 78 (02/03/20 0805)  Resp: 17 (02/03/20 0805)  BP: (!) 96/52 (02/03/20 0805)  SpO2: 95 % (02/03/20 0805) Vital Signs (24h Range):  Temp:  [97.4 °F (36.3 °C)-98.7 °F (37.1 °C)] 97.8 °F (36.6 °C)  Pulse:  [64-78] 78  Resp:  [17-18] 17  SpO2:  [95 %-99 %] 95 %  BP: ()/(50-66) 96/52     Weight: 95.7 kg (210 lb 15.7 oz)  Body mass index is 34.05 kg/m².    Intake/Output Summary (Last 24 hours) at 2/3/2020 1008  Last data filed at 2/3/2020 0900  Gross per 24 hour   Intake 720 ml   Output 2725 ml   Net -2005 ml      Physical Exam   Constitutional: She is oriented to person, place, and time. No distress.   HENT:   Head: Normocephalic and atraumatic.   Mouth/Throat: Oropharynx is clear and moist. No oropharyngeal exudate.   Eyes: Conjunctivae are normal. Scleral icterus is present.   Cardiovascular: Intact distal pulses.   Pulmonary/Chest: Effort normal. No respiratory distress. She has no wheezes.   Improved air entry bilaterally   Abdominal: Soft. Bowel sounds are normal. She exhibits no distension. There is no tenderness. There is no guarding.   Healing laproscopic incisions.   Musculoskeletal: She exhibits edema. She exhibits no tenderness.   Neurological: She is alert and oriented to person, place, and time.   Skin: Skin is warm and dry. She is  not diaphoretic.   Jaundiced   Psychiatric: She has a normal mood and affect. Her behavior is normal.   Nursing note and vitals reviewed.      Significant Labs: All pertinent labs within the past 24 hours have been reviewed.    Significant Imaging: I have reviewed and interpreted all pertinent imaging results/findings within the past 24 hours.

## 2020-02-04 ENCOUNTER — TELEPHONE (OUTPATIENT)
Dept: HEPATOLOGY | Facility: CLINIC | Age: 55
End: 2020-02-04

## 2020-02-04 LAB
ALBUMIN SERPL BCP-MCNC: 2.6 G/DL (ref 3.5–5.2)
ALP SERPL-CCNC: 183 U/L (ref 55–135)
ALT SERPL W/O P-5'-P-CCNC: 509 U/L (ref 10–44)
ANION GAP SERPL CALC-SCNC: 9 MMOL/L (ref 8–16)
AST SERPL-CCNC: 394 U/L (ref 10–40)
BASOPHILS # BLD AUTO: 0.06 K/UL (ref 0–0.2)
BASOPHILS NFR BLD: 0.6 % (ref 0–1.9)
BILIRUB SERPL-MCNC: 9.9 MG/DL (ref 0.1–1)
BUN SERPL-MCNC: 9 MG/DL (ref 6–20)
CALCIUM SERPL-MCNC: 9.1 MG/DL (ref 8.7–10.5)
CHLORIDE SERPL-SCNC: 104 MMOL/L (ref 95–110)
CO2 SERPL-SCNC: 25 MMOL/L (ref 23–29)
COMMENT: NORMAL
CREAT SERPL-MCNC: 1 MG/DL (ref 0.5–1.4)
DIFFERENTIAL METHOD: ABNORMAL
EOSINOPHIL # BLD AUTO: 0.5 K/UL (ref 0–0.5)
EOSINOPHIL NFR BLD: 4.8 % (ref 0–8)
ERYTHROCYTE [DISTWIDTH] IN BLOOD BY AUTOMATED COUNT: 19.2 % (ref 11.5–14.5)
EST. GFR  (AFRICAN AMERICAN): >60 ML/MIN/1.73 M^2
EST. GFR  (NON AFRICAN AMERICAN): >60 ML/MIN/1.73 M^2
FINAL PATHOLOGIC DIAGNOSIS: NORMAL
GLUCOSE SERPL-MCNC: 93 MG/DL (ref 70–110)
GROSS: NORMAL
HCT VFR BLD AUTO: 32.3 % (ref 37–48.5)
HGB BLD-MCNC: 10.1 G/DL (ref 12–16)
IMM GRANULOCYTES # BLD AUTO: 0.11 K/UL (ref 0–0.04)
IMM GRANULOCYTES NFR BLD AUTO: 1 % (ref 0–0.5)
INR PPP: 1 (ref 0.8–1.2)
LYMPHOCYTES # BLD AUTO: 2.9 K/UL (ref 1–4.8)
LYMPHOCYTES NFR BLD: 27.5 % (ref 18–48)
MAGNESIUM SERPL-MCNC: 2.1 MG/DL (ref 1.6–2.6)
MCH RBC QN AUTO: 30.5 PG (ref 27–31)
MCHC RBC AUTO-ENTMCNC: 31.3 G/DL (ref 32–36)
MCV RBC AUTO: 98 FL (ref 82–98)
MONOCYTES # BLD AUTO: 1 K/UL (ref 0.3–1)
MONOCYTES NFR BLD: 9 % (ref 4–15)
NEUTROPHILS # BLD AUTO: 6 K/UL (ref 1.8–7.7)
NEUTROPHILS NFR BLD: 57.1 % (ref 38–73)
NRBC BLD-RTO: 0 /100 WBC
PHOSPHATE SERPL-MCNC: 3.2 MG/DL (ref 2.7–4.5)
PHOSPHATIDYLETHANOL (PETH): NEGATIVE NG/ML
PLATELET # BLD AUTO: 284 K/UL (ref 150–350)
PMV BLD AUTO: 14.6 FL (ref 9.2–12.9)
POTASSIUM SERPL-SCNC: 3.7 MMOL/L (ref 3.5–5.1)
PROT SERPL-MCNC: 7.2 G/DL (ref 6–8.4)
PROTHROMBIN TIME: 10.8 SEC (ref 9–12.5)
RBC # BLD AUTO: 3.31 M/UL (ref 4–5.4)
SODIUM SERPL-SCNC: 138 MMOL/L (ref 136–145)
WBC # BLD AUTO: 10.5 K/UL (ref 3.9–12.7)

## 2020-02-04 PROCEDURE — 36415 COLL VENOUS BLD VENIPUNCTURE: CPT

## 2020-02-04 PROCEDURE — 99900035 HC TECH TIME PER 15 MIN (STAT)

## 2020-02-04 PROCEDURE — 25000003 PHARM REV CODE 250: Performed by: STUDENT IN AN ORGANIZED HEALTH CARE EDUCATION/TRAINING PROGRAM

## 2020-02-04 PROCEDURE — 83735 ASSAY OF MAGNESIUM: CPT

## 2020-02-04 PROCEDURE — 85025 COMPLETE CBC W/AUTO DIFF WBC: CPT

## 2020-02-04 PROCEDURE — 85610 PROTHROMBIN TIME: CPT

## 2020-02-04 PROCEDURE — 84100 ASSAY OF PHOSPHORUS: CPT

## 2020-02-04 PROCEDURE — 99232 SBSQ HOSP IP/OBS MODERATE 35: CPT | Mod: ,,, | Performed by: HOSPITALIST

## 2020-02-04 PROCEDURE — 94761 N-INVAS EAR/PLS OXIMETRY MLT: CPT

## 2020-02-04 PROCEDURE — 99232 PR SUBSEQUENT HOSPITAL CARE,LEVL II: ICD-10-PCS | Mod: ,,, | Performed by: HOSPITALIST

## 2020-02-04 PROCEDURE — 20600001 HC STEP DOWN PRIVATE ROOM

## 2020-02-04 PROCEDURE — 97535 SELF CARE MNGMENT TRAINING: CPT

## 2020-02-04 PROCEDURE — 63600175 PHARM REV CODE 636 W HCPCS: Performed by: STUDENT IN AN ORGANIZED HEALTH CARE EDUCATION/TRAINING PROGRAM

## 2020-02-04 PROCEDURE — 80053 COMPREHEN METABOLIC PANEL: CPT

## 2020-02-04 RX ORDER — TALC
6 POWDER (GRAM) TOPICAL NIGHTLY PRN
Status: DISCONTINUED | OUTPATIENT
Start: 2020-02-04 | End: 2020-02-06 | Stop reason: HOSPADM

## 2020-02-04 RX ORDER — SIMETHICONE 80 MG
1 TABLET,CHEWABLE ORAL 3 TIMES DAILY PRN
Status: DISCONTINUED | OUTPATIENT
Start: 2020-02-04 | End: 2020-02-06 | Stop reason: HOSPADM

## 2020-02-04 RX ADMIN — CALCIUM CARBONATE (ANTACID) CHEW TAB 500 MG 500 MG: 500 CHEW TAB at 02:02

## 2020-02-04 RX ADMIN — SIMETHICONE CHEW TAB 80 MG 80 MG: 80 TABLET ORAL at 03:02

## 2020-02-04 RX ADMIN — SIMETHICONE CHEW TAB 80 MG 80 MG: 80 TABLET ORAL at 10:02

## 2020-02-04 RX ADMIN — HYDROXYZINE HYDROCHLORIDE 25 MG: 25 TABLET, FILM COATED ORAL at 12:02

## 2020-02-04 RX ADMIN — SIMETHICONE CHEW TAB 80 MG 80 MG: 80 TABLET ORAL at 11:02

## 2020-02-04 RX ADMIN — ENOXAPARIN SODIUM 40 MG: 100 INJECTION SUBCUTANEOUS at 05:02

## 2020-02-04 RX ADMIN — Medication 6 MG: at 02:02

## 2020-02-04 RX ADMIN — ONDANSETRON 8 MG: 8 TABLET, ORALLY DISINTEGRATING ORAL at 12:02

## 2020-02-04 RX ADMIN — POLYETHYLENE GLYCOL 3350 17 G: 17 POWDER, FOR SOLUTION ORAL at 09:02

## 2020-02-04 RX ADMIN — CALCIUM CARBONATE (ANTACID) CHEW TAB 500 MG 500 MG: 500 CHEW TAB at 10:02

## 2020-02-04 NOTE — PROGRESS NOTES
[facility] Hospital Medicine  Progress Note    Patient Name: Albania Richter  MRN: [id number]  Patient Class: IP- Inpatient   Admission Date: 1/26/2020  Length of Stay: 9 days  Attending Physician: Zheng Thompson MD  Primary Care Provider: John Acosta Jr, MD    Hospital Medicine Team: [facility] [place] [facility] HOSP MED 4 Sary Arango MD    Subjective:     Principal Problem:Liver injury        HPI:  Albania Richter is a 54 year old female with history of fatty liver disease who presents from [facility] for acute liver injury and hepatology evaluation.    She initially presented to the OSH after 1 week of abdominal pain and increased fatigue. She denies fever or chills, nausea or vomiting. She denies confusion. Her only medications ibuprofen and extra strength tylenol (x 2 q4hrs) which she states she only took occasionally for headache. She denies smoking, alcohol, and illicit drug use. She does not have a family history of liver disease or autoimmune disease.     At the OSH, she was BP 90/60's and afebrile on presentation. She had no leukocytosis. Labs were significant for Tbili 4.5, AST/ALT 1662/1831, and alk phos 247. CT scan showed inflamed gallbladder and concerns for acalculous cholecystitis. Surgery was consulted and performed Lap cholecystomy the following day. Intraoperative cholangiogram was negative. HIDA later found to be negative. Subsequent labs on 1/25 were significant for HCV Ab positive. On 1/24, she was started on IV acetylcysteine at the recommendation of GI due to a history of increased Tylenol intake prior to arrival (taking 8-500mg Tylenol daily for 4 days prior to admission, apap level 2.4) which was noted to be complete per transfer note.  .    Transfer to [facility] was requested for further Hepatology evaluation.    Overview/Hospital Course:  Ms. Albania Richter was admitted to hospital medicine on 01/26/20 as a transfer for hepatology evaluation for acute liver  injury. Upon admission she was found to have AST/ALT of 1437 and 1401, with hyperbilirubinemia (24.8). Her Tylenol level was negative on admission, however NAC was continued due to reported history of prior Tylenol use. She was also started on Lactulose due to complaints of extreme fatigue and an elevated ammonia of 86 on admission. She was also initiated on Ceftriaxone for possible underlying infectious etiology; discontinued on 01/30 without evidence of infection. She also received three days of Vitamin K supplementation. Hepatology was consulted on admission and subsequent workup was positive for HepC with moderate HCV viral load (>8 mil), however unclear if representative of acute infection. Subsequent workup was negative for HIV, Hep A and B, HSV and underlying Sumit's disease. 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 On 01/29, NAC was discontinued as LFTs continued to trend down to less than 1K. On 01/30, she had an ultrasound guided liver biopsy to help with further clarification of underlying etiology; results pending. PT/OT evaluated patient on 01/30 with recommendations for rehab; she is pending possible placement versus discharge with home health. She is being continuously monitoring for further decline in liver function.     Interval History: NAEON. Pt reports continued improvement in regards to her SOB and fatigue. No acute or worsening complaints at this time.    Review of Systems   Constitutional: Positive for fatigue. Negative for chills and fever.   Respiratory: Positive for shortness of breath (improved). Negative for cough.    Gastrointestinal: Positive for constipation and nausea. Negative for abdominal pain, diarrhea and vomiting.   Genitourinary: Negative for difficulty urinating.   Psychiatric/Behavioral: The patient is nervous/anxious.      Objective:     Vital Signs (Most Recent):  Temp: 97.7 °F (36.5 °C) (02/04/20 1139)  Pulse: 71 (02/04/20  1139)  Resp: 18 (02/04/20 1139)  BP: (!) 104/56 (02/04/20 1139)  SpO2: 97 % (02/04/20 1139) Vital Signs (24h Range):  Temp:  [97.7 °F (36.5 °C)-99 °F (37.2 °C)] 97.7 °F (36.5 °C)  Pulse:  [68-73] 71  Resp:  [16-18] 18  SpO2:  [94 %-99 %] 97 %  BP: ()/(52-61) 104/56     Weight: 95.7 kg (210 lb 15.7 oz)  Body mass index is 34.05 kg/m².    Intake/Output Summary (Last 24 hours) at 2/4/2020 1608  Last data filed at 2/4/2020 1600  Gross per 24 hour   Intake 360 ml   Output 900 ml   Net -540 ml      Physical Exam   Constitutional: She is oriented to person, place, and time. No distress.   HENT:   Head: Normocephalic and atraumatic.   Mouth/Throat: Oropharynx is clear and moist. No oropharyngeal exudate.   Eyes: Conjunctivae are normal. Scleral icterus is present.   Cardiovascular: Intact distal pulses.   Pulmonary/Chest: Effort normal. No respiratory distress.   Abdominal: Soft. Bowel sounds are normal. She exhibits no distension. There is no tenderness. There is no guarding.   Healing laproscopic incisions.   Musculoskeletal: She exhibits no tenderness.   Neurological: She is alert and oriented to person, place, and time.   Skin: Skin is warm and dry. She is not diaphoretic.   Jaundiced   Psychiatric: Her speech is normal. Thought content normal. She is slowed. She exhibits a depressed mood.   Nursing note and vitals reviewed.      Significant Labs: All pertinent labs within the past 24 hours have been reviewed.    Significant Imaging: I have reviewed and interpreted all pertinent imaging results/findings within the past 24 hours.      Assessment/Plan:      * Acute Liver Injury  This is a 54 year old female with PMH suggestive of non-alcoholic fatty liver disease who is status post admission on 01/26 as a transfer for acute liver injury (AST, ALT >1000) of undetermined etiology associated with almost two week duration of preceding generalized fatigue, nausea, and vomiting with remote prior history of Tylenol usage  following symptom onset. Initial presentation at OSH reported suggestive of possible cholecystitis, however status post lap.cholecystectomy and intra-operative cholangiogram on 01/23 and HIDA scan (reportedly normal) without improvement in liver function. Tylenol level on admission here negative, but status post NAC until 01/29 with slight improvement, in addition to three days of Ceftriaxone and Vitamin K. Status post hepatology evaluation on admission with work-up thus far significant for underlying Hepatitis C (unclear if acute or chronic infection) and positive anti-smooth muscle antibodies. Liver biopsy results from 01/30 pending. Etiology of presentation unclear; differential diagnoses include secondary to Tylenol toxicity versus autoimmune process versus Hepatitis C.     Plan:  -Continue to trend liver function daily.   -Follow-up liver biopsy results.   -Incentive spirometer q1hr for atelectasis prevention.   -Hydroxyzine PRN ordered for pruritis and anxiety.   -Avoid all Tylenol base products and minimize use of opioids in order to not precipitate encephalopathy.   -Hepatology referral as outpatient.  -Disposition: snf. Case management aware, pending placement.     Debility  -PT/OT consulted on admission.   -Recommendations for rehab placement, pt does not meet criteria. Will try for skilled nursing facility given patient's decreased functional status secondary to fatigue caused by acute liver injury    Plan:  -Pending snf placement.       Hypokalemia  Plan:   -Electrolyte repletion ordered PRN.     Normocytic anemia  -Iron studies on admission suggestive of possible anemia of chronic disease, however may be confounded by acute liver injury.       Hypophosphatemia  Plan:   -Electrolyte repletion ordered PRN.       Hypoalbuminemia        Chronic hepatitis C without hepatic coma  -See assessment for acute liver injury.         VTE Risk Mitigation (From admission, onward)         Ordered     enoxaparin  injection 40 mg  Daily      01/27/20 0127     IP VTE HIGH RISK PATIENT  Once      01/26/20 2351     Reason for No Pharmacological VTE Prophylaxis  Once     Question:  Reasons:  Answer:  Risk of Bleeding    01/26/20 1                      Sary Arango MD  Department of Hospital Medicine   [facility]

## 2020-02-04 NOTE — PLAN OF CARE
Problem: Adult Inpatient Plan of Care  Goal: Plan of Care Review  Outcome: Ongoing, Progressing  Plan of care reviewed with patient. Pt c/o insomnia, indigestion, and anxiety. Medicated as ordered. No acute events this shift

## 2020-02-04 NOTE — ASSESSMENT & PLAN NOTE
This is a 54 year old female with PMH suggestive of non-alcoholic fatty liver disease who is status post admission on 01/26 as a transfer for acute liver injury (AST, ALT >1000) of undetermined etiology associated with almost two week duration of preceding generalized fatigue, nausea, and vomiting with remote prior history of Tylenol usage following symptom onset. Initial presentation at OSH reported suggestive of possible cholecystitis, however status post lap.cholecystectomy and intra-operative cholangiogram on 01/23 and HIDA scan (reportedly normal) without improvement in liver function. Tylenol level on admission here negative, but status post NAC until 01/29 with slight improvement, in addition to three days of Ceftriaxone and Vitamin K. Status post hepatology evaluation on admission with work-up thus far significant for underlying Hepatitis C (unclear if acute or chronic infection) and positive anti-smooth muscle antibodies. Liver biopsy results from 01/30 pending. Etiology of presentation unclear; differential diagnoses include secondary to Tylenol toxicity versus autoimmune process versus Hepatitis C.     Plan:  -Continue to trend liver function daily.   -Follow-up liver biopsy results.   -Incentive spirometer q1hr for atelectasis prevention.   -Hydroxyzine PRN ordered for pruritis and anxiety.   -Avoid all Tylenol base products and minimize use of opioids in order to not precipitate encephalopathy.   -Hepatology referral as outpatient.  -Disposition: snf. Case management aware, pending placement.

## 2020-02-04 NOTE — PT/OT/SLP PROGRESS
Occupational Therapy   Treatment    Name: Albania Richter  MRN: [id number]  Admitting Diagnosis:  Liver injury       Recommendations:     Discharge Recommendations: rehabilitation facility  Discharge Equipment Recommendations:  walker, rolling  Barriers to discharge:  None    Assessment:     Albania Richter is a 54 y.o. female with a medical diagnosis of Liver injury.  She presents alert and willing to participate in therapy session. Upon arrival, pt found seated UIC with blinds open. Pt emery's flat affect with chief complaint being fatigue and difficulty sleeping overnight. Performance deficits affecting function are weakness, gait instability, impaired endurance, impaired balance, impaired self care skills, impaired functional mobilty, impaired cardiopulmonary response to activity. At this time, pt emery's improved progress towards goals and tolerated treatment session well. She would benefit from rehab following d/c to continue to progress towards goals and improve quality of life.     Rehab Prognosis:  Good; patient would benefit from acute skilled OT services to address these deficits and reach maximum level of function.       Plan:     Patient to be seen 4 x/week to address the above listed problems via self-care/home management, therapeutic activities, therapeutic exercises, neuromuscular re-education  · Plan of Care Expires: 02/27/20  · Plan of Care Reviewed with: patient    Subjective     Pain/Comfort:  · Pain Rating 1: 0/10(Fatigue)    Objective:     Communicated with: RN prior to session.  Patient found up in chair with telemetry(Chair alarm) upon OT entry to room.    General Precautions: Standard, fall   Orthopedic Precautions:N/A   Braces: N/A     Occupational Performance:     Bed Mobility:    · NT, pt found seated UIC    Functional Mobility/Transfers:  · Patient completed Sit <> Stand Transfer with stand by assistance  with  rolling walker   · Functional Mobility: Pt completed functional mobility from  bedside chair <> bathroom with CGA and RW; distance limited 2/2 fatigue requiring seated rest break.    Activities of Daily Living:  · Grooming: stand by assistance to complete oral care while standing ( pt tolerated standing for ~3 minutes to complete task; pt washed face while seated with supervision   · Upper Body Dressing: minimum assistance to deirdre gown like jacket while seated UIC  · Lower Body Dressing: moderate assistance to deirdre B socks while seated  UIC    AMPAC 6 Click ADL: 21    Treatment & Education:  -Pt edu on OT role/POC  -Importance of OOB activity with staff assistance  -Safety during functional t/f and mobility  -White board updated  - Multiple self care tasks completed--as noted above  - All questions/concerns answered within OT scope of practice    Patient left up in chair with all lines intact, call button in reach and chair alarm onEducation:      GOALS:   Multidisciplinary Problems     Occupational Therapy Goals        Problem: Occupational Therapy Goal    Goal Priority Disciplines Outcome Interventions   Occupational Therapy Goal     OT, PT/OT Ongoing, Progressing    Description:  Goals to be met by: 2/12/20    Patient will increase functional independence with ADLs by performing:    UE Dressing with [place].  LE Dressing with Supervision.  Grooming while standing at sink with Supervision.  Toileting from toilet with Supervision for hygiene and clothing management.   Supine to sit with [place].  Toilet transfer to toilet with Supervision.  Upper extremity exercise program per handout, with independence.                      Time Tracking:     OT Date of Treatment: 02/04/20  OT Start Time: 0855  OT Stop Time: 0912  OT Total Time (min): 17 min    Billable Minutes:Self Care/Home Management 17    Kat Arce OT  2/4/2020

## 2020-02-04 NOTE — PLAN OF CARE
Patient up today and walked to the shower. Waiting for placement in rehab. Son at the bedside and involved in care. VS stable. Plan of care reviewed and questions answered.

## 2020-02-04 NOTE — PLAN OF CARE
Problem: Occupational Therapy Goal  Goal: Occupational Therapy Goal  Description  Goals to be met by: 2/12/20    Patient will increase functional independence with ADLs by performing:    UE Dressing with [place].  LE Dressing with Supervision.  Grooming while standing at sink with Supervision.  Toileting from toilet with Supervision for hygiene and clothing management.   Supine to sit with [place].  Toilet transfer to toilet with Supervision.  Upper extremity exercise program per handout, with independence.     Outcome: Ongoing, Progressing   Pt progressing well with goals. Continue with POC.   Kat Arce, OT  2/4/2020

## 2020-02-04 NOTE — TELEPHONE ENCOUNTER
----- Message from Alvaro Triana sent at 2/3/2020  1:52 PM CST -----  Contact: pt @ [phone]  Pt asking to speak w/ you about scheduling lab

## 2020-02-04 NOTE — SUBJECTIVE & OBJECTIVE
Interval History: NAEON. Pt reports continued improvement in regards to her SOB and fatigue. No acute or worsening complaints at this time.    Review of Systems   Constitutional: Positive for fatigue. Negative for chills and fever.   Respiratory: Positive for shortness of breath (improved). Negative for cough.    Gastrointestinal: Positive for constipation and nausea. Negative for abdominal pain, diarrhea and vomiting.   Genitourinary: Negative for difficulty urinating.   Psychiatric/Behavioral: The patient is nervous/anxious.      Objective:     Vital Signs (Most Recent):  Temp: 97.7 °F (36.5 °C) (02/04/20 1139)  Pulse: 71 (02/04/20 1139)  Resp: 18 (02/04/20 1139)  BP: (!) 104/56 (02/04/20 1139)  SpO2: 97 % (02/04/20 1139) Vital Signs (24h Range):  Temp:  [97.7 °F (36.5 °C)-99 °F (37.2 °C)] 97.7 °F (36.5 °C)  Pulse:  [68-73] 71  Resp:  [16-18] 18  SpO2:  [94 %-99 %] 97 %  BP: ()/(52-61) 104/56     Weight: 95.7 kg (210 lb 15.7 oz)  Body mass index is 34.05 kg/m².    Intake/Output Summary (Last 24 hours) at 2/4/2020 1608  Last data filed at 2/4/2020 1600  Gross per 24 hour   Intake 360 ml   Output 900 ml   Net -540 ml      Physical Exam   Constitutional: She is oriented to person, place, and time. No distress.   HENT:   Head: Normocephalic and atraumatic.   Mouth/Throat: Oropharynx is clear and moist. No oropharyngeal exudate.   Eyes: Conjunctivae are normal. Scleral icterus is present.   Cardiovascular: Intact distal pulses.   Pulmonary/Chest: Effort normal. No respiratory distress.   Improved air entry bilaterally   Abdominal: Soft. Bowel sounds are normal. She exhibits no distension. There is no tenderness. There is no guarding.   Healing laproscopic incisions.   Musculoskeletal: She exhibits no tenderness.   Neurological: She is alert and oriented to person, place, and time.   Skin: Skin is warm and dry. She is not diaphoretic.   Jaundiced   Psychiatric: Her speech is normal. Thought content normal. She is  slowed. She exhibits a depressed mood.   Nursing note and vitals reviewed.      Significant Labs: All pertinent labs within the past 24 hours have been reviewed.    Significant Imaging: I have reviewed and interpreted all pertinent imaging results/findings within the past 24 hours.

## 2020-02-04 NOTE — ASSESSMENT & PLAN NOTE
-PT/OT consulted on admission.   -Recommendations for rehab placement, pt does not meet criteria. Will try for skilled nursing facility given patient's decreased functional status secondary to fatigue caused by acute liver injury    Plan:  -Pending snf placement.

## 2020-02-05 ENCOUNTER — TELEPHONE (OUTPATIENT)
Dept: HEPATOLOGY | Facility: CLINIC | Age: 55
End: 2020-02-05

## 2020-02-05 LAB
ALBUMIN SERPL BCP-MCNC: 2.6 G/DL (ref 3.5–5.2)
ALP SERPL-CCNC: 176 U/L (ref 55–135)
ALT SERPL W/O P-5'-P-CCNC: 379 U/L (ref 10–44)
ANION GAP SERPL CALC-SCNC: 8 MMOL/L (ref 8–16)
ANISOCYTOSIS BLD QL SMEAR: SLIGHT
AST SERPL-CCNC: 262 U/L (ref 10–40)
BASOPHILS # BLD AUTO: 0.05 K/UL (ref 0–0.2)
BASOPHILS NFR BLD: 0.5 % (ref 0–1.9)
BILIRUB SERPL-MCNC: 7.8 MG/DL (ref 0.1–1)
BUN SERPL-MCNC: 8 MG/DL (ref 6–20)
CALCIUM SERPL-MCNC: 9 MG/DL (ref 8.7–10.5)
CHLORIDE SERPL-SCNC: 105 MMOL/L (ref 95–110)
CO2 SERPL-SCNC: 22 MMOL/L (ref 23–29)
CREAT SERPL-MCNC: 1 MG/DL (ref 0.5–1.4)
DIFFERENTIAL METHOD: ABNORMAL
EOSINOPHIL # BLD AUTO: 0.5 K/UL (ref 0–0.5)
EOSINOPHIL NFR BLD: 5.3 % (ref 0–8)
ERYTHROCYTE [DISTWIDTH] IN BLOOD BY AUTOMATED COUNT: 19.1 % (ref 11.5–14.5)
EST. GFR  (AFRICAN AMERICAN): >60 ML/MIN/1.73 M^2
EST. GFR  (NON AFRICAN AMERICAN): >60 ML/MIN/1.73 M^2
GLUCOSE SERPL-MCNC: 103 MG/DL (ref 70–110)
HCT VFR BLD AUTO: 32.3 % (ref 37–48.5)
HGB BLD-MCNC: 10.1 G/DL (ref 12–16)
HYPOCHROMIA BLD QL SMEAR: ABNORMAL
IMM GRANULOCYTES # BLD AUTO: 0.09 K/UL (ref 0–0.04)
IMM GRANULOCYTES NFR BLD AUTO: 0.9 % (ref 0–0.5)
INR PPP: 1 (ref 0.8–1.2)
LYMPHOCYTES # BLD AUTO: 2.7 K/UL (ref 1–4.8)
LYMPHOCYTES NFR BLD: 26.8 % (ref 18–48)
MAGNESIUM SERPL-MCNC: 2.1 MG/DL (ref 1.6–2.6)
MCH RBC QN AUTO: 30.7 PG (ref 27–31)
MCHC RBC AUTO-ENTMCNC: 31.3 G/DL (ref 32–36)
MCV RBC AUTO: 98 FL (ref 82–98)
MONOCYTES # BLD AUTO: 1.1 K/UL (ref 0.3–1)
MONOCYTES NFR BLD: 10.7 % (ref 4–15)
NEUTROPHILS # BLD AUTO: 5.6 K/UL (ref 1.8–7.7)
NEUTROPHILS NFR BLD: 55.8 % (ref 38–73)
NRBC BLD-RTO: 0 /100 WBC
PHOSPHATE SERPL-MCNC: 3.5 MG/DL (ref 2.7–4.5)
PLATELET # BLD AUTO: 278 K/UL (ref 150–350)
PLATELET BLD QL SMEAR: ABNORMAL
PMV BLD AUTO: 14 FL (ref 9.2–12.9)
POIKILOCYTOSIS BLD QL SMEAR: SLIGHT
POLYCHROMASIA BLD QL SMEAR: ABNORMAL
POTASSIUM SERPL-SCNC: 3.8 MMOL/L (ref 3.5–5.1)
PROT SERPL-MCNC: 7.2 G/DL (ref 6–8.4)
PROTHROMBIN TIME: 10.6 SEC (ref 9–12.5)
RBC # BLD AUTO: 3.29 M/UL (ref 4–5.4)
SODIUM SERPL-SCNC: 135 MMOL/L (ref 136–145)
TARGETS BLD QL SMEAR: ABNORMAL
WBC # BLD AUTO: 10.06 K/UL (ref 3.9–12.7)

## 2020-02-05 PROCEDURE — 25000003 PHARM REV CODE 250: Performed by: STUDENT IN AN ORGANIZED HEALTH CARE EDUCATION/TRAINING PROGRAM

## 2020-02-05 PROCEDURE — 80053 COMPREHEN METABOLIC PANEL: CPT

## 2020-02-05 PROCEDURE — 20600001 HC STEP DOWN PRIVATE ROOM

## 2020-02-05 PROCEDURE — 85025 COMPLETE CBC W/AUTO DIFF WBC: CPT

## 2020-02-05 PROCEDURE — 93010 ELECTROCARDIOGRAM REPORT: CPT | Mod: ,,, | Performed by: INTERNAL MEDICINE

## 2020-02-05 PROCEDURE — 97116 GAIT TRAINING THERAPY: CPT

## 2020-02-05 PROCEDURE — 99232 SBSQ HOSP IP/OBS MODERATE 35: CPT | Mod: ,,, | Performed by: HOSPITALIST

## 2020-02-05 PROCEDURE — 85610 PROTHROMBIN TIME: CPT

## 2020-02-05 PROCEDURE — 93010 EKG 12-LEAD: ICD-10-PCS | Mod: ,,, | Performed by: INTERNAL MEDICINE

## 2020-02-05 PROCEDURE — 84100 ASSAY OF PHOSPHORUS: CPT

## 2020-02-05 PROCEDURE — 99252 PR INITIAL INPATIENT CONSULT,LEVL II: ICD-10-PCS | Mod: ,,, | Performed by: NURSE PRACTITIONER

## 2020-02-05 PROCEDURE — 63600175 PHARM REV CODE 636 W HCPCS: Performed by: STUDENT IN AN ORGANIZED HEALTH CARE EDUCATION/TRAINING PROGRAM

## 2020-02-05 PROCEDURE — 36415 COLL VENOUS BLD VENIPUNCTURE: CPT

## 2020-02-05 PROCEDURE — 83735 ASSAY OF MAGNESIUM: CPT

## 2020-02-05 PROCEDURE — 93005 ELECTROCARDIOGRAM TRACING: CPT

## 2020-02-05 PROCEDURE — 99232 PR SUBSEQUENT HOSPITAL CARE,LEVL II: ICD-10-PCS | Mod: ,,, | Performed by: HOSPITALIST

## 2020-02-05 PROCEDURE — 99252 IP/OBS CONSLTJ NEW/EST SF 35: CPT | Mod: ,,, | Performed by: NURSE PRACTITIONER

## 2020-02-05 RX ORDER — KETOROLAC TROMETHAMINE 10 MG/1
10 TABLET, FILM COATED ORAL EVERY 8 HOURS PRN
Status: DISCONTINUED | OUTPATIENT
Start: 2020-02-05 | End: 2020-02-06 | Stop reason: HOSPADM

## 2020-02-05 RX ADMIN — POLYETHYLENE GLYCOL 3350 17 G: 17 POWDER, FOR SOLUTION ORAL at 09:02

## 2020-02-05 RX ADMIN — HYDROXYZINE HYDROCHLORIDE 25 MG: 25 TABLET, FILM COATED ORAL at 03:02

## 2020-02-05 RX ADMIN — CALCIUM CARBONATE (ANTACID) CHEW TAB 500 MG 500 MG: 500 CHEW TAB at 01:02

## 2020-02-05 RX ADMIN — ENOXAPARIN SODIUM 40 MG: 100 INJECTION SUBCUTANEOUS at 05:02

## 2020-02-05 RX ADMIN — SIMETHICONE CHEW TAB 80 MG 80 MG: 80 TABLET ORAL at 03:02

## 2020-02-05 RX ADMIN — KETOROLAC TROMETHAMINE 10 MG: 10 TABLET, FILM COATED ORAL at 01:02

## 2020-02-05 RX ADMIN — HYDROXYZINE HYDROCHLORIDE 25 MG: 25 TABLET, FILM COATED ORAL at 11:02

## 2020-02-05 RX ADMIN — HYDROXYZINE HYDROCHLORIDE 25 MG: 25 TABLET, FILM COATED ORAL at 12:02

## 2020-02-05 NOTE — CONSULTS
[facility]  Physical Medicine & Rehab  Consult Note    Patient Name: Albania Richter  MRN: [id number]  Admission Date: 1/26/2020  Hospital Length of Stay: 10 days  Attending Physician: Zheng Thompson MD     Inpatient consult to Physical Medicine & Rehabilitation  Consult performed by: Suyapa Dangelo NP  Consult requested by:  Zheng Thompson MD    Collaborating Physician: Livia Shelton MD  Reason for Consult:  Rehab evaluation     Consults  Subjective:     Principal Problem: Liver injury    HPI: Albania Richter is a 54-year-old female with PMHx of fatty liver disease.  Patient presented to [facility] on 1/23/20 for abdominal pain, N/V, and worsening fatigue.  On arrival, presentation concerning for acalculous cholecystitis.  General Surgery consulted, and she underwent lap cholecystectomy with intraop cholangiogram and HIDA scan negative without improvement in liver function.  Found to be HCV Ab positive.  [place] [facility] [place] for Hepatology evaluation and further management.  Admitted to Hospital Medicine for acute liver injury.  Hepatology consulted and etiology unclear; differential diagnoses include tylenol toxicity vs autoimmune process vs hepatitis C.    Functional History: Patient lives in [place] with her daughter in a single story home without steps to enter.  Prior to admission, she was (I) with ADLs and mobility.  DME: shower chair.    Hospital Course:   1/29/20:  Evaluated by PT and OT.  Bed mobility SV-SBA.  Sit to stand and transfers CGA with RW.  Ambulated ~10 steps CGA with RW.  UBD Glenn.    2/3/20:  Participated with PT.  Bed mobility SBA.  Sit to stand Glenn with RW.  Ambulated ~16 ft x 3 CGA-Glenn with RW.  2/4/20:  Participated with OT.  Sit to stand SBA with RW.  Functional mobility CGA with RW.  Grooming SBA.  UBD Glenn and LBD modA.    Past Medical History:   Diagnosis Date    Fatty liver disease, nonalcoholic     Hepatitis C 01/27/2020     VL PCR +    S/P laparoscopic cholecystectomy 2020     Past Surgical History:   Procedure Laterality Date     x3       Review of patient's allergies indicates:  No Known Allergies    Scheduled Medications:    enoxaparin  40 mg Subcutaneous Daily    polyethylene glycol  17 g Oral Daily       PRN Medications: calcium carbonate, hydrOXYzine, ketorolac, melatonin, ondansetron, pneumoc 13-rosemary conj-dip cr(PF), simethicone, sodium chloride 0.9%    Family History     None        Tobacco Use    Smoking status: Never Smoker    Smokeless tobacco: Never Used   Substance and Sexual Activity    Alcohol use: Not on file    Drug use: Not on file    Sexual activity: Not on file     Review of Systems   Constitutional: Positive for activity change and fatigue. Negative for chills.   HENT: Negative for drooling, hearing loss, trouble swallowing and voice change.    Eyes: Negative for pain and visual disturbance.   Respiratory: Negative for cough and shortness of breath.    Cardiovascular: Negative for chest pain and palpitations.   Gastrointestinal: Positive for nausea. Negative for abdominal distention and vomiting.   Genitourinary: Negative for difficulty urinating and flank pain.   Musculoskeletal: Positive for gait problem and myalgias. Negative for arthralgias.   Skin: Positive for color change and wound.   Neurological: Positive for weakness. Negative for dizziness, numbness and headaches.   Psychiatric/Behavioral: Negative for agitation and hallucinations. The patient is not nervous/anxious.      Objective:     Vital Signs (Most Recent):  Temp: 98 °F (36.7 °C) (20 1130)  Pulse: 66 (20 1130)  Resp: 18 (20 1130)  BP: (!) 101/56 (20 1130)  SpO2: 98 % (20 1130)    Vital Signs (24h Range):  Temp:  [97.2 °F (36.2 °C)-98.2 °F (36.8 °C)] 98 °F (36.7 °C)  Pulse:  [65-79] 66  Resp:  [16-18] 18  SpO2:  [96 %-98 %] 98 %  BP: ()/(51-57) 101/56     Body mass index is 34.05  kg/m².    Physical Exam   Constitutional: She is oriented to person, place, and time. She appears well-developed and well-nourished. She appears ill. No distress.   General weakness and deconditioning   HENT:   Head: Normocephalic and atraumatic.   Right Ear: External ear normal.   Left Ear: External ear normal.   Nose: Nose normal.   Eyes: Right eye exhibits no discharge. Left eye exhibits no discharge. Scleral icterus is present.   Neck: Normal range of motion.   Cardiovascular: Normal rate and intact distal pulses.   Pulmonary/Chest: Effort normal. No respiratory distress.   Abdominal: Soft. She exhibits no distension. There is no tenderness.   Musculoskeletal: She exhibits edema. She exhibits no tenderness.   Neurological: She is alert and oriented to person, place, and time. No sensory deficit. She exhibits normal muscle tone.   Skin: Skin is warm and dry. No rash noted.   Jaundice   Psychiatric: She has a normal mood and affect. She is slowed.   Vitals reviewed.    Diagnostic Results:   Labs: Reviewed  X-Ray: Reviewed  US: Reviewed  CT: Reviewed    Assessment/Plan:     * Acute Liver Injury  -  Presented for abdominal pain, N/V, and worsening fatigue  -  Initial presentation concerning for acalculous cholecystitis s/p lap cholecystectomy with intraop cholangiogram and HIDA scan negative without improvement in liver function  -  HCV Ab positive  -  Hepatology consulted--> etiology unclear--> differential diagnoses include tylenol toxicity vs autoimmune process vs hepatitis C  -  Management per primary team    Debility  -  (I) with ADLs and mobility at baseline   -  Related to weakness, gait instability, impaired endurance, impaired balance, impaired self care skills, impaired functional mobilty, impaired cardiopulmonary response to activity  See hospital course for functional status.    Recommendations  -  Encourage mobility, OOB in chair, and early ambulation as appropriate  -  PT/OT evaluate and treat  -  Pain  management  -  DVT prophylaxis if appropriate   -  Monitor for and prevent skin breakdown and pressure ulcers  · Early mobility, repositioning/weight shifting every 20-30 minutes when sitting, turn patient every 2 hours, proper mattress/overlay and chair cushioning, pressure relief/heel protector boots    Chronic hepatitis C without hepatic coma  -  See acute liver injury    Post-acute recommendation: likely candidate for inpatient rehab- will discuss with rehab team for final recommendation     Thank you for your consult.     ZENOBIA Hui  Department of Physical Medicine & Rehab  [facility]

## 2020-02-05 NOTE — HPI
Albania Richter is a 54-year-old female with PMHx of fatty liver disease.  Patient presented to [facility] on 1/23/20 for abdominal pain, N/V, and worsening fatigue.  On arrival, presentation concerning for acalculous cholecystitis.  General Surgery consulted, and she underwent lap cholecystectomy with intraop cholangiogram and HIDA scan negative without improvement in liver function.  Found to be HCV Ab positive.  Transferred to [facility] on 1/26/20 for Hepatology evaluation and further management.  Admitted to Hospital Medicine for acute liver injury.  Hepatology consulted and etiology unclear; differential diagnoses include tylenol toxicity vs autoimmune process vs hepatitis C.    Functional History: Patient lives in [place] with her daughter in a single story home without steps to enter.  Prior to admission, she was (I) with ADLs and mobility.  DME: shower chair.

## 2020-02-05 NOTE — PLAN OF CARE
Problem: Physical Therapy Goal  Goal: Physical Therapy Goal  Description  Goals to be met by: 2020    Patient will increase functional independence with mobility by performin. Supine to sit with Tucker  2. Sit to stand transfer with Tucker  3. Gait  x 150 feet with Tucker using least restrictive AD  4. Lower extremity exercise program x 20 reps per handout, with independence     Outcome: Ongoing, Progressing     Goals remain appropriate. Continue current POC, progressing as tolerated.     Tracey Ferraro, ISABELLA  2020

## 2020-02-05 NOTE — ASSESSMENT & PLAN NOTE
-  (I) with ADLs and mobility at baseline   -  Related to weakness, gait instability, impaired endurance, impaired balance, impaired self care skills, impaired functional mobilty, impaired cardiopulmonary response to activity  See hospital course for functional status.    Recommendations  -  Encourage mobility, OOB in chair, and early ambulation as appropriate  -  PT/OT evaluate and treat  -  Pain management  -  DVT prophylaxis if appropriate   -  Monitor for and prevent skin breakdown and pressure ulcers  · Early mobility, repositioning/weight shifting every 20-30 minutes when sitting, turn patient every 2 hours, proper mattress/overlay and chair cushioning, pressure relief/heel protector boots

## 2020-02-05 NOTE — PROGRESS NOTES
[facility] Hospital Medicine  Progress Note    Patient Name: Albania Richter  MRN: [id number]  Patient Class: IP- Inpatient   Admission Date: 1/26/2020  Length of Stay: 10 days  Attending Physician: Zheng Thompson MD  Primary Care Provider: John Acosta Jr, MD    Hospital Medicine Team: [facility] 4 Sary Arango MD    Subjective:     Principal Problem:Liver injury        HPI:  Albania Richter is a 54 year old female with history of fatty liver disease who presents from [facility] for acute liver injury and hepatology evaluation.    She initially presented to the OSH after 1 week of abdominal pain and increased fatigue. She denies fever or chills, nausea or vomiting. She denies confusion. Her only medications ibuprofen and extra strength tylenol (x 2 q4hrs) which she states she only took occasionally for headache. She denies smoking, alcohol, and illicit drug use. She does not have a family history of liver disease or autoimmune disease.     At the OSH, she was BP 90/60's and afebrile on presentation. She had no leukocytosis. Labs were significant for Tbili 4.5, AST/ALT 1662/1831, and alk phos 247. CT scan showed inflamed gallbladder and concerns for acalculous cholecystitis. Surgery was consulted and performed Lap cholecystomy the following day. Intraoperative cholangiogram was negative. HIDA later found to be negative. Subsequent labs on 1/25 were significant for HCV Ab positive. On 1/24, she was started on IV acetylcysteine at the recommendation of GI due to a history of increased Tylenol intake prior to arrival (taking 8-500mg Tylenol daily for 4 days prior to admission, apap level 2.4) which was noted to be complete per transfer note.  .    [place] [facility] [place] for further Hepatology evaluation.    Overview/Hospital Course:  Ms. Albania Richter was admitted to hospital medicine on 01/26/20 as a transfer for hepatology evaluation for acute liver  injury. Upon admission she was found to have AST/ALT of 1437 and 1401, with hyperbilirubinemia (24.8). Her Tylenol level was negative on admission, however NAC was continued due to reported history of prior Tylenol use. She was also started on Lactulose due to complaints of extreme fatigue and an elevated ammonia of 86 on admission. She was also initiated on Ceftriaxone for possible underlying infectious etiology; discontinued on 01/30 without evidence of infection. She also received three days of Vitamin K supplementation. Hepatology was consulted on admission and subsequent workup was positive for HepC with moderate HCV viral load (>8 mil), however unclear if representative of acute infection. Subsequent workup was negative for HIV, Hep A and B, HSV and underlying Sumit's disease. 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 On 01/29, NAC was discontinued as LFTs continued to trend down to less than 1K. On 01/30, she had an ultrasound guided liver biopsy to help with further clarification of underlying etiology; results pending. PT/OT evaluated patient on 01/30 with recommendations for rehab; she is pending possible placement for a skilled nursing facility. She is being continuously monitoring for further decline in liver function.     Interval History: NAEON. No newly acute or worsening complaints at this time.     Review of Systems   Constitutional: Positive for fatigue. Negative for chills and fever.   Respiratory: Positive for shortness of breath. Negative for cough.    Gastrointestinal: Positive for constipation and nausea. Negative for abdominal pain, diarrhea and vomiting.   Genitourinary: Negative for difficulty urinating.   Psychiatric/Behavioral: The patient is nervous/anxious.      Objective:     Vital Signs (Most Recent):  Temp: 99.6 °F (37.6 °C) (02/05/20 1631)  Pulse: 83 (02/05/20 1631)  Resp: 18 (02/05/20 1631)  BP: 99/64 (02/05/20 1631)  SpO2: 97 % (02/05/20  1631) Vital Signs (24h Range):  Temp:  [97.2 °F (36.2 °C)-99.6 °F (37.6 °C)] 99.6 °F (37.6 °C)  Pulse:  [65-83] 83  Resp:  [16-18] 18  SpO2:  [96 %-98 %] 97 %  BP: ()/(51-64) 99/64     Weight: 95.7 kg (210 lb 15.7 oz)  Body mass index is 34.05 kg/m².    Intake/Output Summary (Last 24 hours) at 2/5/2020 1754  Last data filed at 2/4/2020 2200  Gross per 24 hour   Intake --   Output 400 ml   Net -400 ml      Physical Exam   Constitutional: She is oriented to person, place, and time. No distress.   HENT:   Head: Normocephalic and atraumatic.   Eyes: Conjunctivae are normal. Scleral icterus is present.   Cardiovascular: Intact distal pulses.   Pulmonary/Chest: Effort normal and breath sounds normal. No respiratory distress.   Abdominal: Soft. She exhibits no distension. There is no tenderness. There is no guarding.   Healing laproscopic incisions.   Musculoskeletal: She exhibits no tenderness.   Neurological: She is alert and oriented to person, place, and time.   Skin: Skin is warm and dry. She is not diaphoretic.   Jaundiced   Psychiatric: Her speech is normal. Thought content normal. She is slowed. She exhibits a depressed mood.   Nursing note and vitals reviewed.      Significant Labs: All pertinent labs within the past 24 hours have been reviewed.    Significant Imaging: I have reviewed and interpreted all pertinent imaging results/findings within the past 24 hours.      Assessment/Plan:      * Acute Liver Injury  This is a 54 year old female with PMH suggestive of non-alcoholic fatty liver disease who is status post admission on 01/26 as a transfer for acute liver injury (AST, ALT >1000) of undetermined etiology associated with almost two week duration of preceding generalized fatigue, nausea, and vomiting with remote prior history of Tylenol usage following symptom onset. Initial presentation at OSH reported suggestive of possible cholecystitis, however status post lap.cholecystectomy and intra-operative  cholangiogram on 01/23 and HIDA scan (reportedly normal) without improvement in liver function. Tylenol level on admission here negative, but status post NAC until 01/29 with slight improvement, in addition to three days of Ceftriaxone and Vitamin K. Status post hepatology evaluation on admission with work-up thus far significant for underlying Hepatitis C (unclear if acute or chronic infection) and positive anti-smooth muscle antibodies. Liver biopsy results from 01/30 pending. Etiology of presentation unclear; differential diagnoses include secondary to Tylenol toxicity versus autoimmune process versus Hepatitis C.     Plan:  -Continue to trend liver function daily.   -Follow-up liver biopsy results.   -Incentive spirometer q1hr for atelectasis prevention.   -Hydroxyzine PRN ordered for pruritis and anxiety.   -Avoid all Tylenol base products and minimize use of opioids in order to not precipitate encephalopathy.   -Hepatology referral as outpatient.  -Disposition: snf. Case management aware, pending placement.     Debility  -PT/OT consulted on admission.   -Recommendations for rehab placement, pt does not meet criteria. Will try for skilled nursing facility given patient's decreased functional status secondary to fatigue caused by acute liver injury    Plan:  -Pending snf placement.       Hypokalemia  Plan:   -Electrolyte repletion ordered PRN.     Normocytic anemia  -Iron studies on admission suggestive of possible anemia of chronic disease, however may be confounded by acute liver injury.       Hypophosphatemia  Plan:   -Electrolyte repletion ordered PRN.       Hypoalbuminemia        Chronic hepatitis C without hepatic coma  -See assessment for acute liver injury.         VTE Risk Mitigation (From admission, onward)         Ordered     enoxaparin injection 40 mg  Daily      01/27/20 0127     IP VTE HIGH RISK PATIENT  Once      01/26/20 2351     Reason for No Pharmacological VTE Prophylaxis  Once     Question:   Reasons:  Answer:  Risk of Bleeding    01/26/20 9139                      Sary Arango MD  Department of Hospital Medicine   [facility]

## 2020-02-05 NOTE — SUBJECTIVE & OBJECTIVE
Past Medical History:   Diagnosis Date    Fatty liver disease, nonalcoholic     Hepatitis C 2020    VL PCR +    S/P laparoscopic cholecystectomy 2020     Past Surgical History:   Procedure Laterality Date     x3       Review of patient's allergies indicates:  No Known Allergies    Scheduled Medications:    enoxaparin  40 mg Subcutaneous Daily    polyethylene glycol  17 g Oral Daily       PRN Medications: calcium carbonate, hydrOXYzine, ketorolac, melatonin, ondansetron, pneumoc 13-rosemary conj-dip cr(PF), simethicone, sodium chloride 0.9%    Family History     None        Tobacco Use    Smoking status: Never Smoker    Smokeless tobacco: Never Used   Substance and Sexual Activity    Alcohol use: Not on file    Drug use: Not on file    Sexual activity: Not on file     Review of Systems   Constitutional: Positive for activity change and fatigue. Negative for chills.   HENT: Negative for drooling, hearing loss, trouble swallowing and voice change.    Eyes: Negative for pain and visual disturbance.   Respiratory: Negative for cough and shortness of breath.    Cardiovascular: Negative for chest pain and palpitations.   Gastrointestinal: Positive for nausea. Negative for abdominal distention and vomiting.   Genitourinary: Negative for difficulty urinating and flank pain.   Musculoskeletal: Positive for gait problem and myalgias. Negative for arthralgias.   Skin: Positive for color change and wound.   Neurological: Positive for weakness. Negative for dizziness, numbness and headaches.   Psychiatric/Behavioral: Negative for agitation and hallucinations. The patient is not nervous/anxious.      Objective:     Vital Signs (Most Recent):  Temp: 98 °F (36.7 °C) (20 1130)  Pulse: 66 (20 1130)  Resp: 18 (20 1130)  BP: (!) 101/56 (20 1130)  SpO2: 98 % (20 1130)    Vital Signs (24h Range):  Temp:  [97.2 °F (36.2 °C)-98.2 °F (36.8 °C)] 98 °F (36.7 °C)  Pulse:  [65-79] 66  Resp:   [16-18] 18  SpO2:  [96 %-98 %] 98 %  BP: ()/(51-57) 101/56     Body mass index is 34.05 kg/m².    Physical Exam   Constitutional: She is oriented to person, place, and time. She appears well-developed and well-nourished. She appears ill. No distress.   General weakness and deconditioning   HENT:   Head: Normocephalic and atraumatic.   Right Ear: External ear normal.   Left Ear: External ear normal.   Nose: Nose normal.   Eyes: Right eye exhibits no discharge. Left eye exhibits no discharge. Scleral icterus is present.   Neck: Normal range of motion.   Cardiovascular: Normal rate and intact distal pulses.   Pulmonary/Chest: Effort normal. No respiratory distress.   Abdominal: Soft. She exhibits no distension. There is no tenderness.   Musculoskeletal: She exhibits edema. She exhibits no tenderness.   Neurological: She is alert and oriented to person, place, and time. No sensory deficit. She exhibits normal muscle tone.   Skin: Skin is warm and dry. No rash noted.   Jaundice   Psychiatric: She has a normal mood and affect. She is slowed.   Vitals reviewed.    NEUROLOGICAL EXAMINATION:     MENTAL STATUS   Oriented to person, place, and time.       Diagnostic Results:   Labs: Reviewed  X-Ray: Reviewed  US: Reviewed  CT: Reviewed

## 2020-02-05 NOTE — SUBJECTIVE & OBJECTIVE
Interval History: NAEON. No newly acute or worsening complaints at this time.     Review of Systems   Constitutional: Positive for fatigue. Negative for chills and fever.   Respiratory: Positive for shortness of breath. Negative for cough.    Gastrointestinal: Positive for constipation and nausea. Negative for abdominal pain, diarrhea and vomiting.   Genitourinary: Negative for difficulty urinating.   Psychiatric/Behavioral: The patient is nervous/anxious.      Objective:     Vital Signs (Most Recent):  Temp: 99.6 °F (37.6 °C) (02/05/20 1631)  Pulse: 83 (02/05/20 1631)  Resp: 18 (02/05/20 1631)  BP: 99/64 (02/05/20 1631)  SpO2: 97 % (02/05/20 1631) Vital Signs (24h Range):  Temp:  [97.2 °F (36.2 °C)-99.6 °F (37.6 °C)] 99.6 °F (37.6 °C)  Pulse:  [65-83] 83  Resp:  [16-18] 18  SpO2:  [96 %-98 %] 97 %  BP: ()/(51-64) 99/64     Weight: 95.7 kg (210 lb 15.7 oz)  Body mass index is 34.05 kg/m².    Intake/Output Summary (Last 24 hours) at 2/5/2020 1754  Last data filed at 2/4/2020 2200  Gross per 24 hour   Intake --   Output 400 ml   Net -400 ml      Physical Exam   Constitutional: She is oriented to person, place, and time. No distress.   HENT:   Head: Normocephalic and atraumatic.   Eyes: Conjunctivae are normal. Scleral icterus is present.   Cardiovascular: Intact distal pulses.   Pulmonary/Chest: Effort normal and breath sounds normal. No respiratory distress.   Abdominal: Soft. She exhibits no distension. There is no tenderness. There is no guarding.   Healing laproscopic incisions.   Musculoskeletal: She exhibits no tenderness.   Neurological: She is alert and oriented to person, place, and time.   Skin: Skin is warm and dry. She is not diaphoretic.   Jaundiced   Psychiatric: Her speech is normal. Thought content normal. She is slowed. She exhibits a depressed mood.   Nursing note and vitals reviewed.      Significant Labs: All pertinent labs within the past 24 hours have been reviewed.    Significant Imaging:  I have reviewed and interpreted all pertinent imaging results/findings within the past 24 hours.

## 2020-02-05 NOTE — PROGRESS NOTES
"  [facility]  Adult Nutrition  Consult Note    SUMMARY     Recommendations    1. Continue current Regular diet + Boost Plus ONS.   2. RD to monitor & follow-up.    Goals: Meet % EEN, EPN  Nutrition Goal Status: new  Communication of RD Recs: reviewed with RN    Reason for Assessment    Reason For Assessment: length of stay  Diagnosis: other (see comments)(Liver injury)  Interdisciplinary Rounds: did not attend    General Information Comments: Pt w/ good appetite, consuming % of meals, tolerating diet. Good appetite, stable wt PTA (UBW: 210#). Pt appears nourished w/ no other indicatioors of malnutrition.   Nutrition Discharge Planning: Adequate PO intake    Nutrition/Diet History    Patient Reported Diet/Restrictions/Preferences: general  Spiritual, Cultural Beliefs, Druze Practices, Values that Affect Care: no  Factors Affecting Nutritional Intake: None identified at this time    Anthropometrics    Temp: 98 °F (36.7 °C)  Height Method: Stated  Height: 5' 6" (167.6 cm)  Height (inches): 66 in  Weight Method: Standard Scale  Weight: 95.7 kg (210 lb 15.7 oz)  Weight (lb): 210.98 lb  Ideal Body Weight (IBW), Female: 130 lb  % Ideal Body Weight, Female (lb): 162.29 %  BMI (Calculated): 34.1  BMI Grade: 30 - 34.9- obesity - grade I    Lab/Procedures/Meds    Pertinent Labs Reviewed: reviewed  Pertinent Labs Comments: Na 135, Bili 7.8  Pertinent Medications Reviewed: reviewed    Estimated/Assessed Needs    Weight Used For Calorie Calculations: 95.7 kg (210 lb 15.7 oz)     Energy Calorie Requirements (kcal): 1961 kcal/d  Energy Need Method: [place]-St Jeor(1.25)     Protein Requirements: 96 g/d (1 g/kg)  Weight Used For Protein Calculations: 95.7 kg (210 lb 15.7 oz)     Estimated Fluid Requirement Method: other (see comments)(1 mL/genevieve or per MD)     Nutrition Prescription Ordered    Current Diet Order: Regular  Oral Nutrition Supplement: Boost Plus ONS    Evaluation of Received " Nutrient/Fluid Intake    Comments: LBM: 2/4    Tolerance: tolerating    Nutrition Risk    Level of Risk/Frequency of Follow-up: (1x/week)     Assessment and Plan    No nutritional dx at this time.     Monitor and Evaluation    Food and Nutrient Intake: energy intake, food and beverage intake  Food and Nutrient Adminstration: diet order  Physical Activity and Function: nutrition-related ADLs and IADLs  Anthropometric Measurements: weight, weight change  Biochemical Data, Medical Tests and Procedures: lipid profile, inflammatory profile, glucose/endocrine profile, gastrointestinal profile, electrolyte and renal panel  Nutrition-Focused Physical Findings: overall appearance     Nutrition Follow-Up    RD Follow-up?: Yes

## 2020-02-05 NOTE — TELEPHONE ENCOUNTER
----- Message from [place] MA sent at 2/4/2020  5:43 PM CST -----      ----- Message -----  From: Kati Garza RN  Sent: 2/4/2020   2:20 PM CST  To: Alexandria Eason MA, #    Good afternoon Ryan, at the time of discharge, Dr Mendoza wanted to follow weekly labs on Ms. Richter.  Do you know if they will be drawing labs when she is discharged to Rehab?    Thanks for your help  Kati

## 2020-02-05 NOTE — PLAN OF CARE
Recommendations     1. Continue current Regular diet + Boost Plus ONS.   2. RD to monitor & follow-up.

## 2020-02-05 NOTE — PLAN OF CARE
Pt AAO*4, calm, cooperative. Pt complained of pain, MD notified. Ketorolac prescribed, administered as ordered. Pt ambulated to bathroom, tolerated well. I&O per flowsheet. No acute changes overnight.

## 2020-02-05 NOTE — HOSPITAL COURSE
1/29/20:  Evaluated by PT and OT.  Bed mobility SV-SBA.  Sit to stand and transfers CGA with RW.  Ambulated ~10 steps CGA with RW.  UBD Glenn.    2/3/20:  Participated with PT.  Bed mobility SBA.  Sit to stand Glenn with RW.  Ambulated ~16 ft x 3 CGA-Glenn with RW.  2/4/20:  Participated with OT.  Sit to stand SBA with RW.  Functional mobility CGA with RW.  Grooming SBA.  UBD Glenn and LBD modA.  2/5/20:  Participated with PT.  Bed mobility SV.  Sit to stand SV.  Ambulated ~300 ft SBA.  Ascended and descended 1 step x 2 CGA.

## 2020-02-05 NOTE — PT/OT/SLP PROGRESS
"Physical Therapy   Progress Note    Patient Name:  Albania Richter  MRN: [id number]  Recent Surgery: * No surgery found *      Recommendations:     Discharge Recommendations: Home Health PT  Equipment recommendations: rolling walker  Barriers to discharge: Fall risk     Assessment:     Albania Richter is a 54 y.o. female [place] [facility] [place] with medical diagnosis of Liver injury. Pt presents with weakness, decreased endurance, decreased safety awareness, impaired functional mobility  and gait instability. Pt tolerated treatment well. Ms. Richter was able to ambulate increased gait distances with decreased fatigue compared to previous sessions. Pt still requires slight physical assistance to maintain safety when completing functional tasks. Due to patient progress, discharge recommendations modified to home health PT.   Albania Richter would benefit from continued acute PT intervention to address listed functional deficits, provide patient/caregiver education, reduce fall risk, and maximize (I) and safety with functional mobility. Once medically stable, recommending pt discharge home health PT.     Rehab Prognosis: Good; patient would benefit from acute skilled PT services to address these deficits and reach maximum level of function.      Plan:     During this hospitalization, patient to be seen 3 x/week to address the identified rehab impairments via gait training, therapeutic activities, therapeutic exercises, neuromuscular re-education and progress towards stated goals.     Plan of Care Expires:  02/28/20  Plan of Care reviewed with: patient    This plan of care has been discussed with the patient/caregiver, who was included in its development and is in agreement with the identified goals and treatment plan.     Subjective     Communicated with RN prior to session.  Patient agreeable to participate. No family/caregiver support present at bedside upon PT entrance into room.    Patient comments/goals: "It feels " "good to be able to get up and take a shower."    Pain/Comfort:  · Location: n/a  · Pain Ratin/10   · Pain Rating Post-Intervention: 0/10     Patients cultural, spiritual, Congregational conflicts given the current situation: No cultural, spiritual, or educational needs identified.    Objective:     Patient found supine with HOB eleveated with: telemetry, bed alarm    General Precautions: Standard, fall   Orthopedic Precautions:N/A   Braces: N/A   Body mass index is 34.05 kg/m².  Oxygen Device: room air    Cognition:   Pt is Oriented, Alert and Cooperative during session.    Functional Mobility:    Bed Mobility:  · Supine > Sit: Supervision   · Sit > Supine: Did not assess pt left up in chair    Transfers:   · Sit <> Stand Transfer: Supervision  from EOB with no AD                  Gait:  · Distance: ~300 ft with one seated rest break   · Assistance level: Stand-by Assistance  · Assistive Device: none  · Gait Assessment: decreased step length  and decreased gait speed    Stairs:  · Ascend/descend 1 steps x 2 trails with Contact Guard Assistance  · One trial using handrail and one without handrail      Outcome Measure: AM-PAC 6 CLICK MOBILITY  Total Score:20     Patient/Caregiver Education and Therapeutic Activities/Exercises     Gait and stair training this session focused on improving pt endurance and facilitating independence with functional mobility.     Therapist educated pt/caregiver regarding:    PT POC and goals for therapy    Safety with mobility and fall risk    Instruction on use of call button and importance of calling nursing staff for assistance with mobility     Patient/caregiver able to verbalize understanding; will follow-up with pt/caregiver during current admit for additional questions/concerns within scope of practice.     White board updated.     Patient left up in chair with all lines intact, call button in reach and RN notified.    Goals:     Multidisciplinary Problems     Physical Therapy " Goals        Problem: Physical Therapy Goal    Goal Priority Disciplines Outcome Goal Variances Interventions   Physical Therapy Goal     PT, PT/OT Ongoing, Progressing     Description:  Goals to be met by: 2020    Patient will increase functional independence with mobility by performin. Supine to sit with [place]  2. Sit to stand transfer with [place]  3. Gait  x 150 feet with [place] using least restrictive AD  4. Lower extremity exercise program x 20 reps per handout, with independence                      Time Tracking:       PT Received On: 20  PT Start Time: 0834     PT Stop Time: 0850  PT Total Time (min): 16 min     Billable Minutes: Gait Training 16 minutes    ISABELLA Castro  2020

## 2020-02-05 NOTE — CONSULTS
Inpatient consult to Physical Medicine Rehab  Consult performed by: ZENOBIA Bonner  Consult ordered by: Zheng Thompson MD  Reason for consult: rehab evaluation      Consult received.  Reviewed patient history and current admission.  Rehab team following.  Full consult to follow.    JASS Bonner, ZENOBIA-C  Physical Medicine & Rehabilitation   02/05/2020

## 2020-02-05 NOTE — ASSESSMENT & PLAN NOTE
-  Presented for abdominal pain, N/V, and worsening fatigue  -  Initial presentation concerning for acalculous cholecystitis s/p lap cholecystectomy with intraop cholangiogram and HIDA scan negative without improvement in liver function  -  HCV Ab positive  -  Hepatology consulted--> etiology unclear--> differential diagnoses include tylenol toxicity vs autoimmune process vs hepatitis C  -  Management per primary team

## 2020-02-06 ENCOUNTER — CONFERENCE (OUTPATIENT)
Dept: TRANSPLANT | Facility: CLINIC | Age: 55
End: 2020-02-06

## 2020-02-06 VITALS
BODY MASS INDEX: 33.91 KG/M2 | SYSTOLIC BLOOD PRESSURE: 101 MMHG | TEMPERATURE: 98 F | DIASTOLIC BLOOD PRESSURE: 59 MMHG | HEIGHT: 66 IN | OXYGEN SATURATION: 97 % | RESPIRATION RATE: 17 BRPM | WEIGHT: 211 LBS | HEART RATE: 73 BPM

## 2020-02-06 LAB
ALBUMIN SERPL BCP-MCNC: 2.6 G/DL (ref 3.5–5.2)
ALP SERPL-CCNC: 179 U/L (ref 55–135)
ALT SERPL W/O P-5'-P-CCNC: 287 U/L (ref 10–44)
ANION GAP SERPL CALC-SCNC: 9 MMOL/L (ref 8–16)
AST SERPL-CCNC: 189 U/L (ref 10–40)
BASOPHILS # BLD AUTO: 0.05 K/UL (ref 0–0.2)
BASOPHILS NFR BLD: 0.5 % (ref 0–1.9)
BILIRUB SERPL-MCNC: 6.3 MG/DL (ref 0.1–1)
BUN SERPL-MCNC: 10 MG/DL (ref 6–20)
CALCIUM SERPL-MCNC: 8.9 MG/DL (ref 8.7–10.5)
CHLORIDE SERPL-SCNC: 107 MMOL/L (ref 95–110)
CO2 SERPL-SCNC: 22 MMOL/L (ref 23–29)
CREAT SERPL-MCNC: 1.1 MG/DL (ref 0.5–1.4)
DIFFERENTIAL METHOD: ABNORMAL
EOSINOPHIL # BLD AUTO: 0.5 K/UL (ref 0–0.5)
EOSINOPHIL NFR BLD: 5 % (ref 0–8)
ERYTHROCYTE [DISTWIDTH] IN BLOOD BY AUTOMATED COUNT: 18.8 % (ref 11.5–14.5)
EST. GFR  (AFRICAN AMERICAN): >60 ML/MIN/1.73 M^2
EST. GFR  (NON AFRICAN AMERICAN): 56.7 ML/MIN/1.73 M^2
GLUCOSE SERPL-MCNC: 99 MG/DL (ref 70–110)
HCT VFR BLD AUTO: 32.2 % (ref 37–48.5)
HGB BLD-MCNC: 9.9 G/DL (ref 12–16)
IMM GRANULOCYTES # BLD AUTO: 0.1 K/UL (ref 0–0.04)
IMM GRANULOCYTES NFR BLD AUTO: 0.9 % (ref 0–0.5)
LYMPHOCYTES # BLD AUTO: 2.8 K/UL (ref 1–4.8)
LYMPHOCYTES NFR BLD: 25.7 % (ref 18–48)
MAGNESIUM SERPL-MCNC: 2.1 MG/DL (ref 1.6–2.6)
MCH RBC QN AUTO: 30.6 PG (ref 27–31)
MCHC RBC AUTO-ENTMCNC: 30.7 G/DL (ref 32–36)
MCV RBC AUTO: 99 FL (ref 82–98)
MONOCYTES # BLD AUTO: 1.2 K/UL (ref 0.3–1)
MONOCYTES NFR BLD: 11.2 % (ref 4–15)
NEUTROPHILS # BLD AUTO: 6.1 K/UL (ref 1.8–7.7)
NEUTROPHILS NFR BLD: 56.7 % (ref 38–73)
NRBC BLD-RTO: 0 /100 WBC
PHOSPHATE SERPL-MCNC: 3.7 MG/DL (ref 2.7–4.5)
PLATELET # BLD AUTO: 262 K/UL (ref 150–350)
PMV BLD AUTO: 13.9 FL (ref 9.2–12.9)
POTASSIUM SERPL-SCNC: 3.9 MMOL/L (ref 3.5–5.1)
PROT SERPL-MCNC: 7.1 G/DL (ref 6–8.4)
RBC # BLD AUTO: 3.24 M/UL (ref 4–5.4)
SODIUM SERPL-SCNC: 138 MMOL/L (ref 136–145)
WBC # BLD AUTO: 10.71 K/UL (ref 3.9–12.7)

## 2020-02-06 PROCEDURE — 84100 ASSAY OF PHOSPHORUS: CPT

## 2020-02-06 PROCEDURE — 99239 HOSP IP/OBS DSCHRG MGMT >30: CPT | Mod: ,,, | Performed by: HOSPITALIST

## 2020-02-06 PROCEDURE — 99232 SBSQ HOSP IP/OBS MODERATE 35: CPT | Mod: ,,, | Performed by: NURSE PRACTITIONER

## 2020-02-06 PROCEDURE — 36415 COLL VENOUS BLD VENIPUNCTURE: CPT

## 2020-02-06 PROCEDURE — 80053 COMPREHEN METABOLIC PANEL: CPT

## 2020-02-06 PROCEDURE — 83735 ASSAY OF MAGNESIUM: CPT

## 2020-02-06 PROCEDURE — 25000003 PHARM REV CODE 250: Performed by: STUDENT IN AN ORGANIZED HEALTH CARE EDUCATION/TRAINING PROGRAM

## 2020-02-06 PROCEDURE — 97530 THERAPEUTIC ACTIVITIES: CPT

## 2020-02-06 PROCEDURE — 99232 PR SUBSEQUENT HOSPITAL CARE,LEVL II: ICD-10-PCS | Mod: ,,, | Performed by: NURSE PRACTITIONER

## 2020-02-06 PROCEDURE — 85025 COMPLETE CBC W/AUTO DIFF WBC: CPT

## 2020-02-06 PROCEDURE — 99239 PR HOSPITAL DISCHARGE DAY,>30 MIN: ICD-10-PCS | Mod: ,,, | Performed by: HOSPITALIST

## 2020-02-06 RX ORDER — HYDROXYZINE HYDROCHLORIDE 25 MG/1
25 TABLET, FILM COATED ORAL 3 TIMES DAILY PRN
Qty: 30 TABLET | Refills: 3 | Status: SHIPPED | OUTPATIENT
Start: 2020-02-06 | End: 2020-02-06

## 2020-02-06 RX ORDER — HYDROXYZINE HYDROCHLORIDE 25 MG/1
25 TABLET, FILM COATED ORAL 3 TIMES DAILY PRN
Qty: 30 TABLET | Refills: 3 | Status: SHIPPED | OUTPATIENT
Start: 2020-02-06 | End: 2020-02-11 | Stop reason: SDUPTHER

## 2020-02-06 RX ADMIN — POLYETHYLENE GLYCOL 3350 17 G: 17 POWDER, FOR SOLUTION ORAL at 09:02

## 2020-02-06 NOTE — PLAN OF CARE
SW attempted to meet with patient at bedside. Patient was not in the room.     EZEKIEL submitted referral for home health to [facility].        02/06/20 1424   Post-Acute Status   Post-Acute Authorization Home Health/Hospice   Home Health/Hospice Status Referrals Sent     Brisa Hackett LCSW  [facility]  [id number]

## 2020-02-06 NOTE — PLAN OF CARE
Problem: Occupational Therapy Goal  Goal: Occupational Therapy Goal  Description  Goals to be met by: 2/12/20    Patient will increase functional independence with ADLs by performing:    UE Dressing with [place].  LE Dressing with Supervision. Met 2/6 for socks; continue for pants  Grooming while standing at sink with Supervision.  Toileting from toilet with Supervision for hygiene and clothing management.   Supine to sit with [place]. Met 2/6  Toilet transfer to toilet with Supervision.  Upper extremity exercise program per handout, with independence. Met 2/6      Outcome: Ongoing, Progressing   Pt progressing well with goals. Continue with POC. Due to progression with goals.   Kat Arce, OT  2/6/2020

## 2020-02-06 NOTE — PLAN OF CARE
Pt AAO*4, calm, cooperative. No complains of pain. Rested well overnight. Safely maintained overnight. Requested for anxiety medication, administered per order. Will continue to monitor.

## 2020-02-06 NOTE — PT/OT/SLP PROGRESS
Occupational Therapy   Treatment/POC change    Name: Albania Richter  MRN: [id number]  Admitting Diagnosis:  Liver injury       Recommendations:     Discharge Recommendations: home health OT  Discharge Equipment Recommendations:  walker, rolling  Barriers to discharge:  None    Assessment:     Albania Richter is a 54 y.o. female with a medical diagnosis of Liver injury.  She presents alert and willing to participate in therapy session. Upon arrival, pt found supine with no family in room. Performance deficits affecting function are impaired self care skills, weakness, impaired endurance. Pt has made significant progress with goals and tolerated treatment session well and appropriate for HH therapy. She would benefit from HHOT following d/c to continue to progress towards goals and improve quality of life. Pt will need RW upon discharge and has good family support.    Rehab Prognosis:  Good; patient would benefit from acute skilled OT services to address these deficits and reach maximum level of function.       Plan:     Patient to be seen 3 x/week to address the above listed problems via self-care/home management, therapeutic activities, therapeutic exercises, neuromuscular re-education  · Plan of Care Expires: 02/27/20  · Plan of Care Reviewed with: patient    Subjective     Pain/Comfort:  · Pain Rating 1: 0/10    Objective:     Communicated with: RN prior to session.  Patient found supine with telemetry upon OT entry to room.    General Precautions: Standard, fall   Orthopedic Precautions:N/A   Braces: N/A     Occupational Performance:     Bed Mobility:    · Patient completed Rolling/Turning to Right with independence  · Patient completed Supine to Sit with independence  · Patient completed Sit to Supine with independence     Functional Mobility/Transfers:  · Patient completed Sit <> Stand Transfer with supervision  with  rolling walker   · Functional Mobility: Pt completed functional mobility in [facility]  distance) with RW and SBA. She tolerated well with no LOB or seated rest breaks required.     Activities of Daily Living:  · Upper Body Dressing: stand by assistance to deirdre gown like jacket while standing  · Lower Body Dressing: supervision to deirdre B socks while seated EOB      AMPA 6 Click ADL: 22    Treatment & Education:  -Pt edu on OT role/POC  -Importance of OOB activity with staff assistance  -Safety during functional t/f and mobility ( proper hand placement during transfers)   - White board updated  - All questions/concerns answered within OT scope of practice  - Reviewed BUE HEP-- pt demo'd exercises with independence while seated EOB    Patient left supine with all lines intact, call button in reach and RN notifiedEducation:      GOALS:   Multidisciplinary Problems     Occupational Therapy Goals        Problem: Occupational Therapy Goal    Goal Priority Disciplines Outcome Interventions   Occupational Therapy Goal     OT, PT/OT Ongoing, Progressing    Description:  Goals to be met by: 2/12/20    Patient will increase functional independence with ADLs by performing:    UE Dressing with [place].  LE Dressing with Supervision. Met 2/6 for socks; continue for pants  Grooming while standing at sink with Supervision.  Toileting from toilet with Supervision for hygiene and clothing management.   Supine to sit with [place]. Met 2/6  Toilet transfer to toilet with Supervision.  Upper extremity exercise program per handout, with independence. Met 2/6                       Time Tracking:     OT Date of Treatment: 02/06/20  OT Start Time: 0915  OT Stop Time: 0930  OT Total Time (min): 15 min    Billable Minutes:Therapeutic Activity 15    Kat Arce OT  2/6/2020

## 2020-02-06 NOTE — PLAN OF CARE
SW notified by Brenna that patient has been accepted by [facility].        02/06/20 0812   Post-Acute Status   Post-Acute Authorization Home Health/Hospice   Home Health/Hospice Status Set-up Complete     Brisa Hackett, LCSW [facility]  [id number]

## 2020-02-06 NOTE — ASSESSMENT & PLAN NOTE
This is a 54 year old female with PMH suggestive of non-alcoholic fatty liver disease who is status post admission on 01/26 as a transfer for acute liver injury (AST, ALT >1000) of undetermined etiology associated with almost two week duration of preceding generalized fatigue, nausea, and vomiting with remote prior history of Tylenol usage following symptom onset. Initial presentation at OSH reportedly suggestive of possible cholecystitis, however status post lap.cholecystectomy and intra-operative cholangiogram on 01/23 and HIDA scan (reportedly normal) without improvement in liver function. Tylenol level on admission here was negative, but status post NAC until 01/29 with slight improvement, in addition to three days of Ceftriaxone and Vitamin K. She is status post hepatology evaluation on admission with work-up thus far significant for underlying Hepatitis C (unclear if acute or chronic infection) and positive anti-smooth muscle antibodies (however without significantly high titer). Liver biopsy results from 01/30 showed steatohepatitis and possible advanced fibrosis, however pathologist noted sample was suboptimal likely needing to be repeated. Etiology of presentation unclear; differential diagnoses include secondary to Tylenol toxicity versus autoimmune process versus Hepatitis C. Liver function abnormalities continuously improved with supportive management.     Plan:  -Status post multiple failed attempts at obtaining placement with rehab and SNF; will discharge home with home health for continued PT/OT.   -Outpatient follow-up with hepatology scheduled in 04/2020.   -Prescription for Hydroxyzine PRN provided for relief of anxiety and pruritis.   -Instructions to avoid all Tylenol based products.

## 2020-02-06 NOTE — ASSESSMENT & PLAN NOTE
-PT/OT consulted on admission.   -Recommendations for rehab placement, pt does not meet criteria. Will try for skilled nursing facility given patient's decreased functional status secondary to fatigue caused by acute liver injury    Plan:  -Discharge home with PT/OT via home health.

## 2020-02-06 NOTE — TELEPHONE ENCOUNTER
Liver Pathology Conference:      Liver Biopsy: increased fat/ looks like alcohol/  + HCV; some duct reaction     Plan : check PETH

## 2020-02-06 NOTE — SUBJECTIVE & OBJECTIVE
Interval History 2/6/2020:  Patient is seen for follow-up rehab evaluation and recommendations: Denies pain, feeling better today.  Progressing well with therapy.  Hopes to go home with family at discharge.     HPI, Past Medical, Family, and Social History remains the same as documented in the initial encounter.    Scheduled Medications:    enoxaparin  40 mg Subcutaneous Daily    polyethylene glycol  17 g Oral Daily     PRN Medications: calcium carbonate, hydrOXYzine, ketorolac, melatonin, ondansetron, pneumoc 13-rosemary conj-dip cr(PF), simethicone, sodium chloride 0.9%    Review of Systems   Constitutional: Positive for activity change and fatigue. Negative for chills.   HENT: Negative for trouble swallowing and voice change.    Eyes: Negative for pain and visual disturbance.   Respiratory: Negative for cough and shortness of breath.    Cardiovascular: Negative for chest pain and palpitations.   Gastrointestinal: Positive for nausea. Negative for abdominal distention and vomiting.   Genitourinary: Negative for difficulty urinating and flank pain.   Musculoskeletal: Positive for gait problem and myalgias. Negative for arthralgias.   Skin: Positive for color change and wound.   Neurological: Positive for weakness. Negative for numbness and headaches.   Psychiatric/Behavioral: Negative for agitation. The patient is not nervous/anxious.      Objective:     Vital Signs (Most Recent):  Temp: 96.5 °F (35.8 °C) (02/06/20 0833)  Pulse: 68 (02/06/20 0833)  Resp: 18 (02/06/20 0833)  BP: 98/63 (02/06/20 0833)  SpO2: 97 % (02/06/20 0833)    Vital Signs (24h Range):  Temp:  [96.5 °F (35.8 °C)-99.6 °F (37.6 °C)] 96.5 °F (35.8 °C)  Pulse:  [65-83] 68  Resp:  [16-19] 18  SpO2:  [94 %-98 %] 97 %  BP: ()/(51-64) 98/63     Physical Exam   Constitutional: She is oriented to person, place, and time. She appears well-developed and well-nourished. She appears ill. No distress.   General weakness and deconditioning   HENT:   Head:  Normocephalic and atraumatic.   Right Ear: External ear normal.   Left Ear: External ear normal.   Nose: Nose normal.   Eyes: Right eye exhibits no discharge. Left eye exhibits no discharge. Scleral icterus is present.   Neck: Normal range of motion.   Cardiovascular: Normal rate and intact distal pulses.   Pulmonary/Chest: Effort normal. No respiratory distress.   Abdominal: Soft. She exhibits no distension. There is no tenderness.   Musculoskeletal: She exhibits edema. She exhibits no tenderness.   Neurological: She is alert and oriented to person, place, and time. No sensory deficit. She exhibits normal muscle tone.   Skin: Skin is warm and dry. No rash noted.   Jaundice   Psychiatric: She has a normal mood and affect. She is slowed.   Vitals reviewed.    Diagnostic Results:   Labs: Reviewed  X-Ray: Reviewed  US: Reviewed  CT: Reviewed    NEUROLOGICAL EXAMINATION:     MENTAL STATUS   Oriented to person, place, and time.

## 2020-02-06 NOTE — PLAN OF CARE
[facility]    HOME HEALTH ORDERS  FACE TO FACE ENCOUNTER    Patient Name: Albania Richter  YOB: 1965    PCP: John Acosta Jr, MD   PCP Address: [address] [facility] [address] / [place] [address]  PCP Phone Number: [phone]  PCP Fax: [phone]    Encounter Date: 02/06/2020    Admit to Home Health    Diagnoses:  Active Hospital Problems    Diagnosis  POA    *Acute Liver Injury [S36.119A]  Yes    Debility [R53.81]  Yes    Chronic hepatitis C without hepatic coma [B18.2]  Yes    Hypoalbuminemia [E88.09]  Yes    Normocytic anemia [D64.9]  Yes      Resolved Hospital Problems   No resolved problems to display.       Future Appointments   Date Time Provider Department Center   3/4/2020  8:00 AM Horacio Rowe MD [facility] HEPAT [facility]           I have seen and examined this patient face to face today. My clinical findings that support the need for the home health skilled services and home bound status are the following:  Weakness/numbness causing balance and gait disturbance due to Liver Disease making it taxing to leave home.    Allergies:Review of patient's allergies indicates:  No Known Allergies    Diet: low fat, low cholesterol diet    Activities: activity as tolerated    Nursing:   SN to complete comprehensive assessment including routine vital signs. Instruct on disease process and s/s of complications to report to MD. Review/verify medication list sent home with the patient at time of discharge  and instruct patient/caregiver as needed. Frequency may be adjusted depending on start of care date.    Notify MD if SBP > 160 or < 90; DBP > 90 or < 50; HR > 120 or < 50; Temp > 101; Other:        CONSULTS:    Physical Therapy to evaluate and treat. Evaluate for home safety and equipment needs; Establish/upgrade home exercise program. Perform / instruct on therapeutic exercises, gait training, transfer training, and Range of Motion.  Occupational Therapy to evaluate and  treat. Evaluate home environment for safety and equipment needs. Perform/Instruct on transfers, ADL training, ROM, and therapeutic exercises.    MISCELLANEOUS CARE:  N/A    WOUND CARE ORDERS  n/a      Medications: Review discharge medications with patient and family and provide education.      Current Discharge Medication List      START taking these medications    Details   hydrOXYzine HCl (ATARAX) 25 MG tablet Take 1 tablet (25 mg total) by mouth 3 (three) times daily as needed for Anxiety.         STOP taking these medications       acetaminophen (TYLENOL EXTRA STRENGTH) 500 MG tablet Comments:   Reason for Stopping:         ibuprofen (ADVIL,MOTRIN) 600 MG tablet Comments:   Reason for Stopping:               I certify that this patient is confined to her home and needs physical therapy and occupational therapy.

## 2020-02-06 NOTE — DISCHARGE SUMMARY
[facility] Hospital Medicine  Discharge Summary      Patient Name: Albania Richter  MRN: [id number]  Admission Date: 1/26/2020  Hospital Length of Stay: 11 days  Discharge Date and Time:  02/06/2020 1:15 PM  Attending Physician: Destiny Dalal MD   Discharging Provider: Abel Schmid MD  Primary Care Provider: John Acosta Jr, MD  Hospital Medicine Team: [facility] HOSP MED 4 Abel Schmid MD    HPI:   Albania Richter is a 54 year old female with history of fatty liver disease who presents from [facility] for acute liver injury and hepatology evaluation.    She initially presented to the OSH after 1 week of abdominal pain and increased fatigue. She denies fever or chills, nausea or vomiting. She denies confusion. Her only medications ibuprofen and extra strength tylenol (x 2 q4hrs) which she states she only took occasionally for headache. She denies smoking, alcohol, and illicit drug use. She does not have a family history of liver disease or autoimmune disease.     At the OSH, she was BP 90/60's and afebrile on presentation. She had no leukocytosis. Labs were significant for Tbili 4.5, AST/ALT 1662/1831, and alk phos 247. CT scan showed inflamed gallbladder and concerns for acalculous cholecystitis. Surgery was consulted and performed Lap cholecystomy the following day. Intraoperative cholangiogram was negative. HIDA later found to be negative. Subsequent labs on 1/25 were significant for HCV Ab positive. On 1/24, she was started on IV acetylcysteine at the recommendation of GI due to a history of increased Tylenol intake prior to arrival (taking 8-500mg Tylenol daily for 4 days prior to admission, apap level 2.4) which was noted to be complete per transfer note.  .    Transfer to [facility] was requested for further Hepatology evaluation.    * No surgery found *      Hospital Course:   Ms. Albania Richter was admitted to hospital medicine on 01/26/20 as a transfer for  hepatology evaluation for acute liver injury. Upon admission she was found to have AST/ALT of 1437 and 1401, with hyperbilirubinemia (24.8). Her Tylenol level was negative on admission, however NAC was continued due to reported history of prior Tylenol use. She was also started on Lactulose due to complaints of extreme fatigue and an elevated ammonia of 86 on admission. She was also initiated on Ceftriaxone for possible underlying infectious etiology; discontinued on 01/30 without further evidence of infection. She also received three days of Vitamin K supplementation. Hepatology was consulted on admission and subsequent workup was positive for HepC with moderate HCV viral load (>8 mil), however unclear if representative of acute infection. Subsequent workup was negative for HIV, Hep A and B, HSV and underlying Sumit's disease. Autoimmune workup thus far significant for positive anti-smooth muscle antibodies, however not with significantly high titer. On 01/29, NAC was discontinued as LFTs continued to trend down to less than 1K. On 01/30, she had an ultrasound guided liver biopsy to help with further clarification of underlying etiology; results on 02/04 were suggestive of steatohepatitis with possible advanced fibrosis, however pathologist noted that biopsy was sub-optimal overall and will likely need to be repeated. PT/OT evaluated patient on admission with recommendations for rehab; however were unable to obtain placement to rehab and SNF. Patient's liver enzyme abnormalities and generalized weakness continued to improve and she was discharged home with home health for PT/OT on 02/06. Outpatient follow-up with hepatology scheduled in 04/2020.      Vitals:    02/06/20 0406 02/06/20 0833 02/06/20 1119 02/06/20 1244   BP: (!) 118/55 98/63 (!) 107/56 (!) 101/59   BP Location: Right arm Right arm Right arm Right arm   Patient Position: Lying Lying Lying Lying   Pulse: 73 68 60 73   Resp: 19 18 18 17   Temp: 97.9 °F  (36.6 °C) 96.5 °F (35.8 °C) 98 °F (36.7 °C) 97.8 °F (36.6 °C)   TempSrc: Oral Oral Oral    SpO2: 96% 97% 97% 97%   Weight:       Height:         Physical Exam   Constitutional: She is oriented to person, place, and time. No distress.   HENT:   Head: Normocephalic and atraumatic.   Eyes: Conjunctivae are normal. Scleral icterus is present.   Cardiovascular: Intact distal pulses.   Pulmonary/Chest: Effort normal and breath sounds normal. No respiratory distress.   Abdominal: Soft. She exhibits no distension. There is no tenderness. There is no guarding.   Healing laproscopic incisions.   Musculoskeletal: She exhibits no tenderness.   Neurological: She is alert and oriented to person, place, and time.   Skin: Skin is warm and dry. She is not diaphoretic.   Jaundiced   Psychiatric: Her speech is normal. Thought content normal. She is slowed. She exhibits a depressed mood.   Nursing note and vitals reviewed.    Consults:   Consults (From admission, onward)        Status Ordering Provider     Inpatient consult to Hepatology  Once     Provider:  (Not yet assigned)    Completed EDSON MARTINEZ     Inpatient consult to Interventional Radiology  Once     Provider:  (Not yet assigned)    Completed SILVANA PAGAN     Inpatient consult to Registered Dietitian/Nutritionist  Once     Provider:  (Not yet assigned)    Completed MYRNA LICEA     Inpatient consult to SNF  Once     Provider:  (Not yet assigned)    Acknowledged MYRNA LICEA     Inpatient consult to SNF [facility]  Once     Provider:  (Not yet assigned)    Acknowledged MYRNA LICEA.        * Acute Liver Injury  This is a 54 year old female with PMH suggestive of non-alcoholic fatty liver disease who is status post admission on 01/26 as a transfer for acute liver injury (AST, ALT >1000) of undetermined etiology associated with almost two week duration of preceding generalized fatigue, nausea, and vomiting with remote prior history of Tylenol  usage following symptom onset. Initial presentation at OSH reportedly suggestive of possible cholecystitis, however status post lap.cholecystectomy and intra-operative cholangiogram on 01/23 and HIDA scan (reportedly normal) without improvement in liver function. Tylenol level on admission here was negative, but status post NAC until 01/29 with slight improvement, in addition to three days of Ceftriaxone and Vitamin K. She is status post hepatology evaluation on admission with work-up thus far significant for underlying Hepatitis C (unclear if acute or chronic infection) and positive anti-smooth muscle antibodies (however without significantly high titer). Liver biopsy results from 01/30 showed steatohepatitis and possible advanced fibrosis, however pathologist noted sample was suboptimal likely needing to be repeated. Etiology of presentation unclear; differential diagnoses include secondary to Tylenol toxicity versus autoimmune process versus Hepatitis C. Liver function abnormalities continuously improved with supportive management.     Plan:  -Status post multiple failed attempts at obtaining placement with rehab and SNF; will discharge home with home health for continued PT/OT.   -Outpatient follow-up with hepatology scheduled in 04/2020.   -Prescription for Hydroxyzine PRN provided for relief of anxiety and pruritis.   -Instructions to avoid all Tylenol based products.     Debility  -PT/OT consulted on admission.   -Recommendations for rehab placement, pt does not meet criteria. Will try for skilled nursing facility given patient's decreased functional status secondary to fatigue caused by acute liver injury    Plan:  -Discharge home with PT/OT via home health.       Normocytic anemia  -Iron studies on admission suggestive of possible anemia of chronic disease, however may be confounded by acute liver injury.       Chronic hepatitis C without hepatic coma  -See assessment for acute liver injury.         Final  Active Diagnoses:    Diagnosis Date Noted POA    PRINCIPAL PROBLEM:  Acute Liver Injury [S36.119A] 01/26/2020 Yes    Debility [R53.81] 01/31/2020 Yes    Chronic hepatitis C without hepatic coma [B18.2] 01/27/2020 Yes    Hypoalbuminemia [E88.09] 01/27/2020 Yes    Normocytic anemia [D64.9] 01/27/2020 Yes      Problems Resolved During this Admission:       Discharged Condition: good    Disposition: Home or Self Care    Follow Up:  Follow-up Information     John Acosta Jr, MD. Schedule an appointment as soon as possible for a visit in 1 day.    Specialty:  Internal Medicine  Why:  For post-hospitalization follow-up appointment.   Contact information:  [address]  [address]  [place] [address] [phone]                 Patient Instructions:      Diet Adult Regular     Activity as tolerated       Significant Diagnostic Studies:   Labs:   CMP   Recent Labs   Lab 02/05/20  0411 02/06/20  0501   * 138   K 3.8 3.9    107   CO2 22* 22*    99   BUN 8 10   CREATININE 1.0 1.1   CALCIUM 9.0 8.9   PROT 7.2 7.1   ALBUMIN 2.6* 2.6*   BILITOT 7.8* 6.3*   ALKPHOS 176* 179*   * 189*   * 287*   ANIONGAP 8 9   ESTGFRAFRICA >60.0 >60.0   EGFRNONAA >60.0 56.7*     Specimen (12h ago, onward)    None          Pending Diagnostic Studies:     None         Medications:  Reconciled Home Medications:      Medication List      START taking these medications    hydrOXYzine HCl 25 MG tablet  Commonly known as:  ATARAX  Take 1 tablet (25 mg total) by mouth 3 (three) times daily as needed for Anxiety.        STOP taking these medications    ibuprofen 600 MG tablet  Commonly known as:  ADVIL,MOTRIN     Tylenol Extra Strength 500 MG tablet  Generic drug:  acetaminophen            Indwelling Lines/Drains at time of discharge:   Lines/Drains/Airways     None                 Time spent on the discharge of patient: 35 minutes  Patient was seen and examined on the date of discharge and determined  to be suitable for discharge.         Abel Schmid MD  Department of Hospital Medicine  [facility]

## 2020-02-06 NOTE — PROGRESS NOTES
[facility]  Physical Medicine & Rehab  Progress Note    Patient Name: Albania Richter  MRN: [id number]  Admission Date: 1/26/2020  Length of Stay: 11 days  Attending Physician: Destiny Dalal MD    Subjective:     Principal Problem:Liver injury    Hospital Course:   1/29/20:  Evaluated by PT and OT.  Bed mobility SV-SBA.  Sit to stand and transfers CGA with RW.  Ambulated ~10 steps CGA with RW.  UBD Glenn.    2/3/20:  Participated with PT.  Bed mobility SBA.  Sit to stand Glenn with RW.  Ambulated ~16 ft x 3 CGA-Glenn with RW.  2/4/20:  Participated with OT.  Sit to stand SBA with RW.  Functional mobility CGA with RW.  Grooming SBA.  UBD Glenn and LBD modA.  2/5/20:  Participated with PT.  Bed mobility SV.  Sit to stand SV.  Ambulated ~300 ft SBA.  Ascended and descended 1 step x 2 CGA.    Interval History 2/6/2020:  Patient is seen for follow-up rehab evaluation and recommendations: Denies pain, feeling better today.  Progressing well with therapy.  Hopes to go home with family at discharge.     HPI, Past Medical, Family, and Social History remains the same as documented in the initial encounter.    Scheduled Medications:    enoxaparin  40 mg Subcutaneous Daily    polyethylene glycol  17 g Oral Daily     PRN Medications: calcium carbonate, hydrOXYzine, ketorolac, melatonin, ondansetron, pneumoc 13-rosemary conj-dip cr(PF), simethicone, sodium chloride 0.9%    Review of Systems   Constitutional: Positive for activity change and fatigue. Negative for chills.   HENT: Negative for trouble swallowing and voice change.    Eyes: Negative for pain and visual disturbance.   Respiratory: Negative for cough and shortness of breath.    Cardiovascular: Negative for chest pain and palpitations.   Gastrointestinal: Positive for nausea. Negative for abdominal distention and vomiting.   Genitourinary: Negative for difficulty urinating and flank pain.   Musculoskeletal: Positive for gait problem and myalgias. Negative  for arthralgias.   Skin: Positive for color change and wound.   Neurological: Positive for weakness. Negative for numbness and headaches.   Psychiatric/Behavioral: Negative for agitation. The patient is not nervous/anxious.      Objective:     Vital Signs (Most Recent):  Temp: 96.5 °F (35.8 °C) (02/06/20 0833)  Pulse: 68 (02/06/20 0833)  Resp: 18 (02/06/20 0833)  BP: 98/63 (02/06/20 0833)  SpO2: 97 % (02/06/20 0833)    Vital Signs (24h Range):  Temp:  [96.5 °F (35.8 °C)-99.6 °F (37.6 °C)] 96.5 °F (35.8 °C)  Pulse:  [65-83] 68  Resp:  [16-19] 18  SpO2:  [94 %-98 %] 97 %  BP: ()/(51-64) 98/63     Physical Exam   Constitutional: She is oriented to person, place, and time. She appears well-developed and well-nourished. She appears ill. No distress.   General weakness and deconditioning   HENT:   Head: Normocephalic and atraumatic.   Right Ear: External ear normal.   Left Ear: External ear normal.   Nose: Nose normal.   Eyes: Right eye exhibits no discharge. Left eye exhibits no discharge. Scleral icterus is present.   Neck: Normal range of motion.   Cardiovascular: Normal rate and intact distal pulses.   Pulmonary/Chest: Effort normal. No respiratory distress.   Abdominal: Soft. She exhibits no distension. There is no tenderness.   Musculoskeletal: She exhibits edema. She exhibits no tenderness.   Neurological: She is alert and oriented to person, place, and time. No sensory deficit. She exhibits normal muscle tone.   Skin: Skin is warm and dry. No rash noted.   Jaundice   Psychiatric: She has a normal mood and affect. She is slowed.   Vitals reviewed.    Diagnostic Results:   Labs: Reviewed  X-Ray: Reviewed  US: Reviewed  CT: Reviewed    Assessment/Plan:      * Acute Liver Injury  Chronic hepatitis C without hepatic coma  -  Presented for abdominal pain, N/V, and worsening fatigue  -  Initial presentation concerning for acalculous cholecystitis s/p lap cholecystectomy with intraop cholangiogram and HIDA scan  negative without improvement in liver function  -  HCV Ab positive  -  Hepatology consulted--> etiology unclear--> differential diagnoses include tylenol toxicity vs autoimmune process vs hepatitis C  -  Management per primary team    Debility  -  (I) with ADLs and mobility at baseline   -  Related to weakness, gait instability, impaired endurance, impaired balance, impaired self care skills, impaired functional mobilty, impaired cardiopulmonary response to activity  See hospital course for functional status.    Recommendations  -  Encourage mobility, OOB in chair, and early ambulation as appropriate  -  PT/OT evaluate and treat  -  Pain management  -  DVT prophylaxis if appropriate   -  Monitor for and prevent skin breakdown and pressure ulcers  · Early mobility, repositioning/weight shifting every 20-30 minutes when sitting, turn patient every 2 hours, proper mattress/overlay and chair cushioning, pressure relief/heel protector boots    Post-acute recommendation: unlikely to require post-acute placement, progressing well with therapy- will follow therapy progress for final recommendation     ZENOBIA Hui  Department of Physical Medicine & Rehab   [facility]

## 2020-02-07 NOTE — PLAN OF CARE
02/06/20 1623   Final Note   Assessment Type Final Discharge Note   Anticipated Discharge Disposition Home-Health  ([facility])   What phone number can be called within the next 1-3 days to see how you are doing after discharge? [phone]   Hospital Follow Up  Appt(s) scheduled? Yes   Discharge plans and expectations educations in teach back method with documentation complete? Yes     Future Appointments   Date Time Provider Department Center   3/4/2020  8:00 AM Horacio Rowe MD [facility] HEPAT [facility]

## 2020-02-08 NOTE — PHYSICIAN QUERY
PT Name: Albania Richter  MR #: [id number]    Physician Query Form - Pathology Findings Clarification     CDS: John EDGE,RN        Contact information:[phone]  This form is a permanent document in the medical record.     Query Date: February 7, 2020      By submitting this query, we are merely seeking further clarification of documentation.  Please utilize your independent clinical judgment when addressing the question(s) below.      The medical record contains the following:     Findings Supporting Clinical Information Location in Medical Record             Liver, random, biopsy:  - Acute and chronic hepatitis.                                        She is status post hepatology evaluation on admission with work-up thus far significant for underlying Hepatitis C (unclear if acute or chronic infection)               2/4/20 Final Pathologic Diagnosis                           2/6/20 Hospital Medicine Discharge Summary     Please document the clinical significance of the Pathologists findings of _______________________.    [ X  ] I agree with the Pathology Findings   [   ] I do not agree with the Pathology Findings   [   ] Other/Clarification of Findings:   [  ] Clinically Undetermined       Please document in your progress notes daily for the duration of treatment until resolved and include in your discharge summary.

## 2020-02-10 ENCOUNTER — PATIENT OUTREACH (OUTPATIENT)
Dept: ADMINISTRATIVE | Facility: CLINIC | Age: 55
End: 2020-02-10

## 2020-02-10 NOTE — PATIENT INSTRUCTIONS
Understanding a Bruised Liver    A bruise (contusion) is a type of injury. It occurs when small blood vessels break open and leak blood into nearby tissues. The liver is a large organ located in the upper right part of the belly (abdomen). It sits under the right ribs. It can become bruised after an injury to the area.  What causes a bruised liver?  Common causes of a bruised liver include:  · Car accidents   · Direct blows to your belly. This may happen while playing a sport or while in a physical fight.  · Falls that injure your belly  Symptoms of a bruised liver  You may feel pain and tenderness in the upper right part of your belly. You may also feel pain under your right ribs, in the right side of your chest, or in your right shoulder. In some cases, you may have bruised skin over the injured area.  Treatment for a bruised liver  Treatment for a bruised liver depends on how severe the injury is. Many cases can often be managed without surgery. But you may still need:  · Close monitoring in the hospital  · Bed rest and IV (intravenous) fluids  · Tests to check for blood loss and other injuries  · Blood transfusions  For a minor bruise  For a minor bruise with little blood loss, you may be discharged from the hospital within a few days. Home care may involve further rest. You may also need to stop some activities until your liver heals. You may also need follow-up care with your healthcare provider.  For a severe case  For severe blood loss or if other injuries are involved, you may need a procedure or surgery. These may be used to:  · Drain excess fluids or blood from your belly  · Find and stop the source of bleeding in your liver or belly  · Fix damage to your liver and other injuries as needed  Possible complications of a bruised liver  These can include:  · Severe blood loss, which can lead to shock  · Infection  · Problems with the pathways (bile ducts) that carry bile from the liver to nearby  organs  · Abdominal compartment syndrome. This occurs when pressure in the belly is higher than normal.  · Death  When to call 911  Call 911 right away if you have signs of shock. These include:  · Pale skin  · Rapid pulse  · Shallow breathing  · Dizziness  · Fainting  · Confusion   When to call your healthcare provider  Call your healthcare provider right away if you have any of these:  · Fever of 100.4°F (38°C) or higher, or as directed by your provider  · Symptoms that dont get better with treatment, or get worse  · Yellowish skin or eyes (jaundice)  · New symptoms    © 7054-2564 [employer]. [address] [place] [address], [place] [address]. All rights reserved. This information is not intended as a substitute for professional medical care. Always follow your healthcare professional's instructions.

## 2020-02-11 ENCOUNTER — TELEPHONE (OUTPATIENT)
Dept: HEPATOLOGY | Facility: CLINIC | Age: 55
End: 2020-02-11

## 2020-02-11 PROCEDURE — G0180 PR HOME HEALTH MD CERTIFICATION: ICD-10-PCS | Mod: ,,, | Performed by: HOSPITALIST

## 2020-02-11 PROCEDURE — G0180 MD CERTIFICATION HHA PATIENT: HCPCS | Mod: ,,, | Performed by: HOSPITALIST

## 2020-02-11 RX ORDER — HYDROXYZINE HYDROCHLORIDE 25 MG/1
25 TABLET, FILM COATED ORAL 3 TIMES DAILY PRN
Qty: 30 TABLET | Refills: 3 | Status: SHIPPED | OUTPATIENT
Start: 2020-02-11 | End: 2020-07-31

## 2020-02-11 NOTE — TELEPHONE ENCOUNTER
Received a call from Home Health nurse SEINA (female) stating that they are admitting patient to home health starting today. They are asking if her Hydroxyzine rx can be resend to [facility] pharmacy.     Fax them her weekly lab order so patient can get this done.     SENIA Home Health nurse  P# [phone]  F# [phone]

## 2020-03-03 ENCOUNTER — TELEPHONE (OUTPATIENT)
Dept: HEPATOLOGY | Facility: CLINIC | Age: 55
End: 2020-03-03

## 2020-03-03 NOTE — TELEPHONE ENCOUNTER
Attempted to contact pt by phone to confirm appointment in am. No answer. VM left asking that pt contact us if rescheduling is needed.

## 2020-03-04 ENCOUNTER — LAB VISIT (OUTPATIENT)
Dept: LAB | Facility: HOSPITAL | Age: 55
End: 2020-03-04
Attending: INTERNAL MEDICINE
Payer: COMMERCIAL

## 2020-03-04 ENCOUNTER — PROCEDURE VISIT (OUTPATIENT)
Dept: HEPATOLOGY | Facility: CLINIC | Age: 55
End: 2020-03-04
Payer: COMMERCIAL

## 2020-03-04 ENCOUNTER — OFFICE VISIT (OUTPATIENT)
Dept: HEPATOLOGY | Facility: CLINIC | Age: 55
End: 2020-03-04
Payer: COMMERCIAL

## 2020-03-04 ENCOUNTER — TELEPHONE (OUTPATIENT)
Dept: HEPATOLOGY | Facility: CLINIC | Age: 55
End: 2020-03-04

## 2020-03-04 VITALS
WEIGHT: 194.69 LBS | HEART RATE: 93 BPM | SYSTOLIC BLOOD PRESSURE: 133 MMHG | DIASTOLIC BLOOD PRESSURE: 82 MMHG | BODY MASS INDEX: 31.29 KG/M2 | HEIGHT: 66 IN | OXYGEN SATURATION: 96 %

## 2020-03-04 DIAGNOSIS — B18.2 CHRONIC HEPATITIS C WITHOUT HEPATIC COMA: Primary | ICD-10-CM

## 2020-03-04 DIAGNOSIS — K76.0 FATTY LIVER DISEASE, NONALCOHOLIC: ICD-10-CM

## 2020-03-04 DIAGNOSIS — K75.81 NASH (NONALCOHOLIC STEATOHEPATITIS): ICD-10-CM

## 2020-03-04 DIAGNOSIS — B18.2 CHRONIC HEPATITIS C WITHOUT HEPATIC COMA: ICD-10-CM

## 2020-03-04 DIAGNOSIS — K74.00 HEPATIC FIBROSIS: ICD-10-CM

## 2020-03-04 PROBLEM — R53.81 DEBILITY: Status: RESOLVED | Noted: 2020-01-31 | Resolved: 2020-03-04

## 2020-03-04 PROBLEM — S36.119A LIVER INJURY: Status: RESOLVED | Noted: 2020-01-26 | Resolved: 2020-03-04

## 2020-03-04 LAB
AFP SERPL-MCNC: 3.2 NG/ML (ref 0–8.4)
ALBUMIN SERPL BCP-MCNC: 3.7 G/DL (ref 3.5–5.2)
ALP SERPL-CCNC: 162 U/L (ref 55–135)
ALT SERPL W/O P-5'-P-CCNC: 74 U/L (ref 10–44)
ANION GAP SERPL CALC-SCNC: 8 MMOL/L (ref 8–16)
AST SERPL-CCNC: 72 U/L (ref 10–40)
BILIRUB SERPL-MCNC: 1.9 MG/DL (ref 0.1–1)
BUN SERPL-MCNC: 15 MG/DL (ref 6–20)
CALCIUM SERPL-MCNC: 10.2 MG/DL (ref 8.7–10.5)
CHLORIDE SERPL-SCNC: 100 MMOL/L (ref 95–110)
CO2 SERPL-SCNC: 27 MMOL/L (ref 23–29)
CREAT SERPL-MCNC: 1.4 MG/DL (ref 0.5–1.4)
ERYTHROCYTE [DISTWIDTH] IN BLOOD BY AUTOMATED COUNT: 12.8 % (ref 11.5–14.5)
EST. GFR  (AFRICAN AMERICAN): 48.8 ML/MIN/1.73 M^2
EST. GFR  (NON AFRICAN AMERICAN): 42.3 ML/MIN/1.73 M^2
GLUCOSE SERPL-MCNC: 420 MG/DL (ref 70–110)
HCT VFR BLD AUTO: 43 % (ref 37–48.5)
HEPATITIS A ANTIBODY, IGG: NEGATIVE
HGB BLD-MCNC: 14.3 G/DL (ref 12–16)
MCH RBC QN AUTO: 30.8 PG (ref 27–31)
MCHC RBC AUTO-ENTMCNC: 33.3 G/DL (ref 32–36)
MCV RBC AUTO: 93 FL (ref 82–98)
PLATELET # BLD AUTO: 272 K/UL (ref 150–350)
PMV BLD AUTO: 12.8 FL (ref 9.2–12.9)
POTASSIUM SERPL-SCNC: 4 MMOL/L (ref 3.5–5.1)
PROT SERPL-MCNC: 8.7 G/DL (ref 6–8.4)
RBC # BLD AUTO: 4.65 M/UL (ref 4–5.4)
SODIUM SERPL-SCNC: 135 MMOL/L (ref 136–145)
WBC # BLD AUTO: 5.74 K/UL (ref 3.9–12.7)

## 2020-03-04 PROCEDURE — 99243 OFF/OP CNSLTJ NEW/EST LOW 30: CPT | Mod: S$GLB,,, | Performed by: INTERNAL MEDICINE

## 2020-03-04 PROCEDURE — 91200 LIVER ELASTOGRAPHY: CPT | Mod: S$GLB,,, | Performed by: INTERNAL MEDICINE

## 2020-03-04 PROCEDURE — 87902 NFCT AGT GNTYP ALYS HEP C: CPT

## 2020-03-04 PROCEDURE — 99243 PR OFFICE CONSULTATION,LEVEL III: ICD-10-PCS | Mod: S$GLB,,, | Performed by: INTERNAL MEDICINE

## 2020-03-04 PROCEDURE — 3008F BODY MASS INDEX DOCD: CPT | Mod: CPTII,S$GLB,, | Performed by: INTERNAL MEDICINE

## 2020-03-04 PROCEDURE — 85027 COMPLETE CBC AUTOMATED: CPT

## 2020-03-04 PROCEDURE — 82105 ALPHA-FETOPROTEIN SERUM: CPT

## 2020-03-04 PROCEDURE — 91200 FIBROSCAN (VIBRATION CONTROLLED TRANSIENT ELASTOGRAPHY): ICD-10-PCS | Mod: S$GLB,,, | Performed by: INTERNAL MEDICINE

## 2020-03-04 PROCEDURE — 99999 PR PBB SHADOW E&M-EST. PATIENT-LVL III: ICD-10-PCS | Mod: PBBFAC,,, | Performed by: INTERNAL MEDICINE

## 2020-03-04 PROCEDURE — 3008F PR BODY MASS INDEX (BMI) DOCUMENTED: ICD-10-PCS | Mod: CPTII,S$GLB,, | Performed by: INTERNAL MEDICINE

## 2020-03-04 PROCEDURE — 80053 COMPREHEN METABOLIC PANEL: CPT

## 2020-03-04 PROCEDURE — 86790 VIRUS ANTIBODY NOS: CPT

## 2020-03-04 PROCEDURE — 99999 PR PBB SHADOW E&M-EST. PATIENT-LVL III: CPT | Mod: PBBFAC,,, | Performed by: INTERNAL MEDICINE

## 2020-03-04 RX ORDER — ONDANSETRON 4 MG/1
TABLET, FILM COATED ORAL
COMMUNITY
Start: 2020-02-20 | End: 2020-07-31

## 2020-03-04 NOTE — PROGRESS NOTES
Hepatology Consult Note    Referring provider: Dr. Nieves Mendoza    Chief complaint:   Chief Complaint   Patient presents with    Hepatitis C       HPI:  Albania Richter is a pleasant 54 y.o. femalewho was referred to Hepatology Clinic for consultation of had concerns including Hepatitis C..      Hospital discharge follow up  3/4/20: liver enzymes are trending down, asymptomatic. VCTE: F3 and grade 3 steatosis. Recent outpatient labs by PCP - high A1C: diabetes. Patient is not aware of diabetes. Patient to follow up with PCP for diabetes management, will repeat labs. RTC in 6 month to document the chronicity of hepatitis C and plan for treatment. She will need to be seen in Hep C clinic.    Background  Admitted to [facility] in 2020 with acute liver injury. Denied significant alcohol use, herbal supplements. Diagnosed with HCV Ab and high viral load. No obvious risk factors for Hep C from patient's history. Patient denied new sexual partners or needle sharing practices.  Liver biopsy done: acute and chronic hepatitis, also steatosis with ballooning and Allegra hyaline: suggestive of steatohepatitis, fibrosis was not determined due to inflammation.    Patient Active Problem List   Diagnosis    Acute Liver Injury    Chronic hepatitis C without hepatic coma    Hypoalbuminemia    Normocytic anemia    Debility       Past Medical History:   Diagnosis Date    Fatty liver disease, nonalcoholic     Hepatitis C 2020    VL PCR +    S/P laparoscopic cholecystectomy 2020       Past Surgical History:   Procedure Laterality Date     x3         History reviewed. No pertinent family history.    Social History     Socioeconomic History    Marital status:      Spouse name: Not on file    Number of children: Not on file    Years of education: Not on file    Highest education level: Not on file   Occupational History    Not on file   Social Needs    Financial resource strain: Not on file    Food  "insecurity:     Worry: Not on file     Inability: Not on file    Transportation needs:     Medical: Not on file     Non-medical: Not on file   Tobacco Use    Smoking status: Never Smoker    Smokeless tobacco: Never Used   Substance and Sexual Activity    Alcohol use: Not on file    Drug use: Not on file    Sexual activity: Not on file   Lifestyle    Physical activity:     Days per week: Not on file     Minutes per session: Not on file    Stress: Not on file   Relationships    Social connections:     Talks on phone: Not on file     Gets together: Not on file     Attends Holiness service: Not on file     Active member of club or organization: Not on file     Attends meetings of clubs or organizations: Not on file     Relationship status: Not on file   Other Topics Concern    Not on file   Social History Narrative    Not on file       Current Outpatient Medications   Medication Sig Dispense Refill    hydrOXYzine HCl (ATARAX) 25 MG tablet Take 1 tablet (25 mg total) by mouth 3 (three) times daily as needed for Anxiety. 30 tablet 3    ondansetron (ZOFRAN) 4 MG tablet        No current facility-administered medications for this visit.        Review of patient's allergies indicates:  No Known Allergies         Vitals:    03/04/20 0758   BP: 133/82   Pulse: 93   SpO2: 96%   Weight: 88.3 kg (194 lb 10.7 oz)   Height: 5' 6" (1.676 m)       Physical Exam    LABS: I personally reviewed pertinent laboratory findings.    Lab Results   Component Value Date     (H) 02/06/2020     (H) 02/06/2020    ALKPHOS 179 (H) 02/06/2020    BILITOT 6.3 (H) 02/06/2020       Lab Results   Component Value Date    WBC 5.74 03/04/2020    HGB 14.3 03/04/2020    HCT 43.0 03/04/2020    MCV 93 03/04/2020     03/04/2020       Lab Results   Component Value Date     02/06/2020    K 3.9 02/06/2020     02/06/2020    CO2 22 (L) 02/06/2020    BUN 10 02/06/2020    CREATININE 1.1 02/06/2020    CALCIUM 8.9 02/06/2020 "    ANIONGAP 9 02/06/2020    ESTGFRAFRICA >60.0 02/06/2020    EGFRNONAA 56.7 (A) 02/06/2020       Lab Results   Component Value Date    HEPAIGM Negative 01/27/2020    HEPBIGM Negative 01/27/2020    HEPBCAB Negative 01/27/2020    HEPCAB Positive (A) 01/27/2020       Lab Results   Component Value Date    SMOOTHMUSCAB Positive 1:40 (A) 01/27/2020       I personally reviewed all recent lab results.  I personally reviewed imaging studies.    Assessment:  54 y.o. female presenting with     1. Chronic hepatitis C without hepatic coma    2. Fatty liver disease, nonalcoholic    3. DAVIES (nonalcoholic steatohepatitis)    4. Hepatic fibrosis          Recommendation(s):  3/4/20: liver enzymes are trending down, asymptomatic. VCTE: F3 and grade 3 steatosis. Recent outpatient labs by PCP - high A1C: diabetes. Patient is not aware of diabetes. Patient to follow up with PCP for diabetes management, will repeat labs. RTC in 6 month to document the chronicity of hepatitis C and plan for treatment. She will need to be seen in Hep C clinic.    A total of 45 minutes were spent face-to-face with the patient during this encounter and over half of that time was spent on counseling and coordination of care.  We discussed in depth the nature of the patient's liver disease, the management plan in details. I also educated the patient about lifestyle modifications which may improve hepatic steatosis, overweight/obesity, insulin resistance and high blood pressure issues. I have provided the patient with an opportunity to ask questions and have all questions answered to his satisfaction.     I have sent communication to the referring physician and/or primary care provider.    Horacio Rowe MD  Staff Physician  Hepatology and Liver Transplant  [facility]  [facility] [place]

## 2020-03-04 NOTE — PROCEDURES
FibroScan (Vibration Controlled Transient Elastography)  Date/Time: 3/4/2020 8:45 AM  Performed by: Horacio Rowe MD  Authorized by: Horacio Rowe MD     Diagnosis:  HCV and NAFLD    Probe:  XL    Universal Protocol: Patient's identity, procedure and site were verified, confirmatory pause was performed.  Discussed procedure including risks and potential complications.  Questions answered.  Patient verbalizes understanding and wishes to proceed with VCTE.     Procedure: After providing explanations of the procedure, patient was placed in the supine position with right arm in maximum abduction to allow optimal exposure of right lateral abdomen.  Patient was briefly assessed, Testing was performed in the mid-axillary location, 50Hz Shear Wave pulses were applied and the resulting Shear Wave and Propagation Speed detected with a 3.5 MHz ultrasonic signal, using the FibroScan probe, Skin to liver capsule distance and liver parenchyma were accessed during the entire examination with the FibroScan probe, Patient was instructed to breathe normally and to abstain from sudden movements during the procedure, allowing for random measurements of liver stiffness. At least 10 Shear Waves were produced, Individual measurements of each Shear Wave were calculated.  Patient tolerated the procedure well with no complications.  Meets discharge criteria as was dismissed.  Rates pain 0 out of 10.  Patient will follow up with ordering provider to review results.      Findings  Median liver stiffness score:  12.6  CAP Reading: dB/m:  297    IQR/med %:  15  Interpretation  Fibrosis interpretation is based on medial liver stiffness - Kilopascal (kPa).    Fibrosis Stage:  F3  Steatosis interpretation is based on controlled attenuation parameter - (dB/m).    Steatosis Grade:  S3

## 2020-03-04 NOTE — LETTER
March 4, 2020      John Acosta Jr., MD  [address]  [address]  [facility] [facility] [address]           [facility]  [address]  [facility] [address]  Phone: [phone]  Fax: [phone]          Patient: Albania Richter   MR Number: [id number]   YOB: 1965   Date of Visit: 3/4/2020       Dear Dr. Nieves Mendoza:    Thank you for referring Albania Richter to me for evaluation. Attached you will find relevant portions of my assessment and plan of care.    If you have questions, please do not hesitate to call me. I look forward to following Albania Richter along with you.    Sincerely,    Horacio Rowe MD    Enclosure  CC:  No Recipients    If you would like to receive this communication electronically, please contact [email or web address] or [phone] to request more information on [email or web address] Link access.    For providers and/or their staff who would like to refer a patient to [facility], please contact us through our one-stop-shop provider referral line, [facility], at [phone].    If you feel you have received this communication in error or would no longer like to receive these types of communications, please e-mail [email or web address]

## 2020-03-04 NOTE — TELEPHONE ENCOUNTER
----- Message from Horacio Rowe MD sent at 3/4/2020 11:01 AM CST -----  Please inform patient that her glucose is very high 420 and needs to see her primary care physician as soon as possible for diabetes treatment. Her liver tests are trending down - getting better and improving.

## 2020-03-04 NOTE — TELEPHONE ENCOUNTER
MA called patient to inform her of her lab results she is unable to reached left her message to the women who answer her phone. She will have patient to give us a callback.

## 2020-03-04 NOTE — TELEPHONE ENCOUNTER
MA called patient inform her of her lab results below, she said that she just talked to her PCP and they send her RX. She understood her results and verbalized understanding.

## 2020-03-09 LAB
HCV GENTYP SERPL NAA+PROBE: ABNORMAL
HCV RNA SERPL NAA+PROBE-LOG IU: 4.45 LOG (10) IU/ML
HCV RNA SERPL QL NAA+PROBE: DETECTED
HCV RNA SPEC NAA+PROBE-ACNC: ABNORMAL IU/ML

## 2020-03-30 ENCOUNTER — EXTERNAL HOME HEALTH (OUTPATIENT)
Dept: HOME HEALTH SERVICES | Facility: HOSPITAL | Age: 55
End: 2020-03-30
Payer: COMMERCIAL

## 2020-05-15 ENCOUNTER — TELEPHONE (OUTPATIENT)
Dept: HEPATOLOGY | Facility: CLINIC | Age: 55
End: 2020-05-15

## 2020-07-08 ENCOUNTER — OFFICE VISIT (OUTPATIENT)
Dept: HEPATOLOGY | Facility: CLINIC | Age: 55
End: 2020-07-08
Payer: COMMERCIAL

## 2020-07-08 VITALS
HEIGHT: 66 IN | OXYGEN SATURATION: 98 % | HEART RATE: 77 BPM | BODY MASS INDEX: 31.85 KG/M2 | DIASTOLIC BLOOD PRESSURE: 74 MMHG | SYSTOLIC BLOOD PRESSURE: 113 MMHG | WEIGHT: 198.19 LBS | RESPIRATION RATE: 19 BRPM

## 2020-07-08 DIAGNOSIS — B18.2 CHRONIC HEPATITIS C WITHOUT HEPATIC COMA: Primary | ICD-10-CM

## 2020-07-08 PROCEDURE — 99999 PR PBB SHADOW E&M-EST. PATIENT-LVL III: ICD-10-PCS | Mod: PBBFAC,,, | Performed by: INTERNAL MEDICINE

## 2020-07-08 PROCEDURE — 99999 PR PBB SHADOW E&M-EST. PATIENT-LVL III: CPT | Mod: PBBFAC,,, | Performed by: INTERNAL MEDICINE

## 2020-07-08 PROCEDURE — 99213 PR OFFICE/OUTPT VISIT, EST, LEVL III, 20-29 MIN: ICD-10-PCS | Mod: S$GLB,,, | Performed by: INTERNAL MEDICINE

## 2020-07-08 PROCEDURE — 3008F PR BODY MASS INDEX (BMI) DOCUMENTED: ICD-10-PCS | Mod: CPTII,S$GLB,, | Performed by: INTERNAL MEDICINE

## 2020-07-08 PROCEDURE — 99213 OFFICE O/P EST LOW 20 MIN: CPT | Mod: S$GLB,,, | Performed by: INTERNAL MEDICINE

## 2020-07-08 PROCEDURE — 3008F BODY MASS INDEX DOCD: CPT | Mod: CPTII,S$GLB,, | Performed by: INTERNAL MEDICINE

## 2020-07-08 RX ORDER — EMPAGLIFLOZIN 10 MG/1
1 TABLET, FILM COATED ORAL
COMMUNITY
Start: 2020-06-25 | End: 2021-09-30 | Stop reason: DRUGHIGH

## 2020-07-08 NOTE — LETTER
July 8, 2020        John Acosta Jr., MD  [address]  [address]  [facility] [address]             [facility]  [address]  [facility] [address]  Phone: [phone]  Fax: [phone]   Patient: Albania Richter   MR Number: [id number]   YOB: 1965   Date of Visit: 7/8/2020       Dear Dr. Acosta:    Thank you for referring Albania Richter to me for evaluation. Attached you will find relevant portions of my assessment and plan of care.    If you have questions, please do not hesitate to call me. I look forward to following Albania Richter along with you.    Sincerely,      Horacio Rowe MD            CC  No Recipients    Enclosure

## 2020-07-08 NOTE — PROGRESS NOTES
Hepatology Consult Note    Referring provider: Dr. Nieves Mendoza    Chief complaint:   Chief Complaint   Patient presents with    Follow-up       HPI:  Albania Richter is a pleasant 54 y.o. female who was referred to Hepatology Clinic for consultation of had concerns including Follow-up..      Follow up  20: liver enzymes have normalized. Needs HCV Treatment. Will refer her to HCV clinic.    Hospital discharge follow up  3/4/20: liver enzymes are trending down, asymptomatic. VCTE: F3 and grade 3 steatosis. Recent outpatient labs by PCP - high A1C: diabetes. Patient is not aware of diabetes. Patient to follow up with PCP for diabetes management, will repeat labs. RTC in 6 month to document the chronicity of hepatitis C and plan for treatment. She will need to be seen in Hep C clinic.    Background  Admitted to [facility] in 2020 with acute liver injury. Denied significant alcohol use, herbal supplements. Diagnosed with HCV Ab and high viral load. No obvious risk factors for Hep C from patient's history. Patient denied new sexual partners or needle sharing practices.  Liver biopsy done: acute and chronic hepatitis, also steatosis with ballooning and Allegra hyaline: suggestive of steatohepatitis, fibrosis was not determined due to inflammation.    Patient Active Problem List   Diagnosis    Chronic hepatitis C without hepatic coma    Hypoalbuminemia    Normocytic anemia    Fatty liver disease, nonalcoholic    Hepatic fibrosis    DAVIES (nonalcoholic steatohepatitis)       Past Medical History:   Diagnosis Date    Fatty liver disease, nonalcoholic     Hepatitis C 2020    VL PCR +    S/P laparoscopic cholecystectomy 2020       Past Surgical History:   Procedure Laterality Date     x3         No family history on file.    Social History     Socioeconomic History    Marital status:      Spouse name: Not on file    Number of children: Not on file    Years of education: Not on file  "   Highest education level: Not on file   Occupational History    Not on file   Social Needs    Financial resource strain: Not on file    Food insecurity     Worry: Not on file     Inability: Not on file    Transportation needs     Medical: Not on file     Non-medical: Not on file   Tobacco Use    Smoking status: Never Smoker    Smokeless tobacco: Never Used   Substance and Sexual Activity    Alcohol use: Not on file    Drug use: Not on file    Sexual activity: Not on file   Lifestyle    Physical activity     Days per week: Not on file     Minutes per session: Not on file    Stress: Not on file   Relationships    Social connections     Talks on phone: Not on file     Gets together: Not on file     Attends Denominational service: Not on file     Active member of club or organization: Not on file     Attends meetings of clubs or organizations: Not on file     Relationship status: Not on file   Other Topics Concern    Not on file   Social History Narrative    Not on file       Current Outpatient Medications   Medication Sig Dispense Refill    empagliflozin (JARDIANCE) 10 mg tablet Take 1 tablet by mouth.      hydrOXYzine HCl (ATARAX) 25 MG tablet Take 1 tablet (25 mg total) by mouth 3 (three) times daily as needed for Anxiety. 30 tablet 3    ondansetron (ZOFRAN) 4 MG tablet        No current facility-administered medications for this visit.        Review of patient's allergies indicates:  No Known Allergies         Vitals:    07/08/20 1621   BP: 113/74   Pulse: 77   Resp: 19   SpO2: 98%   Weight: 89.9 kg (198 lb 3.1 oz)   Height: 5' 6" (1.676 m)       Physical Exam    LABS: I personally reviewed pertinent laboratory findings.    Lab Results   Component Value Date    ALT 74 (H) 03/04/2020    AST 72 (H) 03/04/2020    ALKPHOS 162 (H) 03/04/2020    BILITOT 1.9 (H) 03/04/2020       Lab Results   Component Value Date    WBC 5.74 03/04/2020    HGB 14.3 03/04/2020    HCT 43.0 03/04/2020    MCV 93 03/04/2020    PLT " 272 03/04/2020       Lab Results   Component Value Date     (L) 03/04/2020    K 4.0 03/04/2020     03/04/2020    CO2 27 03/04/2020    BUN 15 03/04/2020    CREATININE 1.4 03/04/2020    CALCIUM 10.2 03/04/2020    ANIONGAP 8 03/04/2020    ESTGFRAFRICA 48.8 (A) 03/04/2020    EGFRNONAA 42.3 (A) 03/04/2020       Lab Results   Component Value Date    HEPAIGM Negative 01/27/2020    HEPBIGM Negative 01/27/2020    HEPBCAB Negative 01/27/2020    HEPCAB Positive (A) 01/27/2020       Lab Results   Component Value Date    SMOOTHMUSCAB Positive 1:40 (A) 01/27/2020       I personally reviewed all recent lab results.  I personally reviewed imaging studies.    Assessment:  54 y.o. female presenting with     1. Chronic hepatitis C without hepatic coma      G1a HCV.  VCTE: F3    Recommendation(s):  - HCV clinic for treatment    A total of 15 minutes were spent face-to-face with the patient during this encounter and over half of that time was spent on counseling and coordination of care.  We discussed in depth the nature of the patient's liver disease, the management plan in details. I also educated the patient about lifestyle modifications which may improve hepatic steatosis, overweight/obesity, insulin resistance and high blood pressure issues. I have provided the patient with an opportunity to ask questions and have all questions answered to his satisfaction.     I have sent communication to the referring physician and/or primary care provider.    Horacio Rowe MD  Staff Physician  Hepatology and Liver Transplant  [facility]  [facility] [place]

## 2020-07-31 ENCOUNTER — LAB VISIT (OUTPATIENT)
Dept: LAB | Facility: HOSPITAL | Age: 55
End: 2020-07-31
Payer: COMMERCIAL

## 2020-07-31 ENCOUNTER — OFFICE VISIT (OUTPATIENT)
Dept: HEPATOLOGY | Facility: CLINIC | Age: 55
End: 2020-07-31
Payer: COMMERCIAL

## 2020-07-31 VITALS
SYSTOLIC BLOOD PRESSURE: 113 MMHG | HEART RATE: 70 BPM | HEIGHT: 66 IN | OXYGEN SATURATION: 95 % | DIASTOLIC BLOOD PRESSURE: 71 MMHG | BODY MASS INDEX: 31.71 KG/M2 | WEIGHT: 197.31 LBS

## 2020-07-31 DIAGNOSIS — B19.20 HEPATITIS C VIRUS INFECTION WITHOUT HEPATIC COMA, UNSPECIFIED CHRONICITY: Primary | ICD-10-CM

## 2020-07-31 DIAGNOSIS — B19.20 HEPATITIS C VIRUS INFECTION WITHOUT HEPATIC COMA, UNSPECIFIED CHRONICITY: ICD-10-CM

## 2020-07-31 PROCEDURE — 99214 PR OFFICE/OUTPT VISIT, EST, LEVL IV, 30-39 MIN: ICD-10-PCS | Mod: S$GLB,,, | Performed by: PHYSICIAN ASSISTANT

## 2020-07-31 PROCEDURE — 99999 PR PBB SHADOW E&M-EST. PATIENT-LVL III: ICD-10-PCS | Mod: PBBFAC,,, | Performed by: PHYSICIAN ASSISTANT

## 2020-07-31 PROCEDURE — 36415 COLL VENOUS BLD VENIPUNCTURE: CPT

## 2020-07-31 PROCEDURE — 99999 PR PBB SHADOW E&M-EST. PATIENT-LVL III: CPT | Mod: PBBFAC,,, | Performed by: PHYSICIAN ASSISTANT

## 2020-07-31 PROCEDURE — 3008F BODY MASS INDEX DOCD: CPT | Mod: CPTII,S$GLB,, | Performed by: PHYSICIAN ASSISTANT

## 2020-07-31 PROCEDURE — 3008F PR BODY MASS INDEX (BMI) DOCUMENTED: ICD-10-PCS | Mod: CPTII,S$GLB,, | Performed by: PHYSICIAN ASSISTANT

## 2020-07-31 PROCEDURE — 87522 HEPATITIS C REVRS TRNSCRPJ: CPT

## 2020-07-31 PROCEDURE — 99214 OFFICE O/P EST MOD 30 MIN: CPT | Mod: S$GLB,,, | Performed by: PHYSICIAN ASSISTANT

## 2020-07-31 RX ORDER — GLIMEPIRIDE 2 MG/1
2 TABLET ORAL
COMMUNITY
Start: 2020-07-14

## 2020-07-31 NOTE — PROGRESS NOTES
HEPATOLOGY CLINIC VISIT NOTE - HCV clinic    REFERRING PROVIDER: Horacio Rowe MD    CHIEF COMPLAINT: Hepatitis C     HISTORY: This is a 54 y.o. black female with chronic hepatitis C, here for further eval / mngmt.     Was admitted 2020 w/ acute liver injury (TBili 24, AST/ALT > 1400). Ultimately found to have HCV (RNA > 8 million) w/ no recent risk factors identified. No other etiologies for liver injury identified.     Has seen Dr Rowe twice since hospital discharge:  3/2020: HCV RNA down to 28,060; Liver panel improving w/ TBili 1.9, AST/ALT in 70s  2020: No updated HCV RNA. Liver panel improving w/ TBili 1.5, AST 31, ALT 27    Has been sent to me for HCV rx    HCV history:  Risks for HCV:    was drug user and had HCV    Prior tattoo placement    No recent risks  - Treatment naive  - Genotype 1a  - NS5A resistance not known  - HCV RNA > 28,000 IU/mL - 3/2020    Liver staging:  Liver Biopsy 2020 (while inpt w/ acute liver injury): acute/chronic hepatits, mild steatosis 30%, steatohepatitis, suspicious for advanced fibrosis but fibrosis assessment limited by acute process and sample  FibroScan 3/4/20: kPa 12.6, F3 (per NAFLD score parameters)    Advanced liver fibrosis:  Well compensated; No evidence of portal HTN  · No HE, jaundice, ascites  · Albumin > 4  · Platelets > 200    MELD - unable to calculate but likely low based on labs    Cirrhosis health maintenance:  - HCC screening - U/S 2020 w/ no liver lesions; AFP 3/2020: 3.2. needs u/s now  - Varices screening -  EGD not indicated  - HAV immunity - s/p 1st dose vaccine 20  - HBV immunity - s/p first 2 vaccine doses 2020 & 2020                Past Medical History:   Diagnosis Date    Fatty liver disease, nonalcoholic     Hepatitis C 2020    VL PCR +    S/P laparoscopic cholecystectomy 2020       Past Surgical History:   Procedure Laterality Date     x3         SOCIAL HISTORY:   Social History     Tobacco Use    Smoking Status Never Smoker   Smokeless Tobacco Never Used     Alcohol - no  Drugs - no      ROS:   No fever, chills, weight loss, (+) fatigue  No chest pain, palpitations, dyspnea, cough  No abdominal pain, nausea, vomiting, GERD  No skin rashes   No headaches  No lower extremity edema  No depression or anxiety      PHYSICAL EXAM:  Friendly Black or  female, in no acute distress; alert and oriented to person, place and time  VITALS: reviewed  HEENT: Sclerae anicteric.   LUNGS: Normal respiratory effort.   ABDOMEN: Flat, soft, nontender.  SKIN: Warm and dry. No jaundice, No obvious rashes.   NEURO/PSYCH: Normal gate. Memory intact. Thought and speech pattern appropriate. Behavior normal. No depression or anxiety noted.    RECENT LABS:  Lab Results   Component Value Date    WBC 5.74 03/04/2020    HGB 14.3 03/04/2020     03/04/2020     Lab Results   Component Value Date    INR 1.0 02/05/2020     Lab Results   Component Value Date    AST 72 (H) 03/04/2020    ALT 74 (H) 03/04/2020    BILITOT 1.9 (H) 03/04/2020    ALBUMIN 3.7 03/04/2020    ALKPHOS 162 (H) 03/04/2020    CREATININE 1.4 03/04/2020    BUN 15 03/04/2020     (L) 03/04/2020    K 4.0 03/04/2020    AFP 3.2 03/04/2020       Outside labs - Care Everywhere               RECENT IMAGING:  Results for orders placed during the hospital encounter of 01/26/20   US Liver with Doppler    Narrative EXAMINATION:  US LIVER WITH DOPPLER    CLINICAL HISTORY:  Acute liver failure;    TECHNIQUE:  Limited abdominal ultrasound was performed.    Liver Doppler exam was also performed.    COMPARISON:  None.    FINDINGS:  Liver: Enlarged, measuring 21.1 cm. Hepatic parenchyma demonstrates mild, diffuse hyperechogenicity suggesting hepatic steatosis.  No focal hepatic abnormalities.    Gallbladder: Surgically absent.    Biliary system: The common duct is not dilated, measuring 3.0 mm.  No intrahepatic ductal dilatation.    Spleen: Upper limit of normal in  size, measuring measuring 11.9 x 3.8 cm.    Pancreas: The visualized portions of pancreas appear normal.    Inferior vena cava: Normal in appearance.    Miscellaneous: Trace abdominal ascites.  Small right pleural effusion.    Vasculature:    *Main hepatic artery: Patent.  *Main portal vein: Patent with proper directional flow.  *Left portal vein:  Patent with proper directional flow.  *Right portal vein:  Patent with proper directional flow.  *SMV:  Patent with proper directional flow.  *Splenic vein: Patent with proper directional flow.  *IVC: Patent  *Left, middle, and right hepatic veins: Patent.  *Umbilical vein: Not patent.      Impression Satisfactory Doppler evaluation of the liver.    Hepatosplenomegaly and findings suggestive of hepatic steatosis.    Status post cholecystectomy.    Electronically signed by resident: Elan Robb  Date:    01/27/2020  Time:    08:16    Electronically signed by: Elan Anaya  Date:    01/27/2020  Time:    09:44           ASSESSMENT:  54 y.o. Black or  female with:  1. HEPATITIS C, GENOTYPE 1a - treatment naive  -- Acute flare 1/2020. Subsequent labs 3/2020 revealed noted drop in HCV RNA. Needs f/u labs now to see if she has cleared on her own. If not, will need HCV Rx.  -- Lacking Immunity to HAV & HBV - vaccine series underway    2. ADVANCED LIVER FIBROSIS / PER CIRRHOSIS  -- FibroScan 3/2020 - F3  -- MELD score - unable to calculate, likely low  -- HCC screening - recent AFP okay. Needs u/s now.      EDUCATION:  The natural history of Hepatitis C, including potential progression to cirrhosis was reviewed. We discussed the increased progression of liver disease secondary to alcohol use; patient was advised to avoid alcohol completely.     Transmission of Hepatitis C was reviewed, including possible sexual transmission. Sexual contacts should be screened. Risk of vertical transmission of Hepatitis C from mother to baby was reviewed. Children should be  screened. Patient should avoid sharing personal products such as razors, toothbrushes, etc.     Discussed goal of HCV eradication to prevent progression of liver disease.  Discussed use of Epclusa daily x 12 weeks w/ potential side effects of fatigue and headache.     -- Antacids, H2 Receptor Antagonist, PPI - Pt not taking  Patient instructed to contact me if experiencing acid related symptoms so further recommendations can be made regarding acid suppression therapy.      Herbal / alternative therapies must be discontinued  Discussed importance of medication adherence and risk of treatment failure / viral resistance if not adherent. Pt has verbalized understanding.      PLAN:  1. U/S abdomen. Needs HCC screening every 6 months indefinitely  2. HCV RNA today. If (+) will move forward w/ Epclusa x 12 weeks

## 2020-08-03 ENCOUNTER — TELEPHONE (OUTPATIENT)
Dept: HEPATOLOGY | Facility: CLINIC | Age: 55
End: 2020-08-03

## 2020-08-03 LAB
HCV RNA SERPL NAA+PROBE-LOG IU: <1.08 LOG (10) IU/ML
HCV RNA SERPL QL NAA+PROBE: NOT DETECTED IU/ML
HCV RNA SPEC NAA+PROBE-ACNC: <12 IU/ML

## 2020-08-03 NOTE — TELEPHONE ENCOUNTER
7/31/20 HCV negative    pls tell pt:  · Labs show there is NO hepatitis C virus in her blood. It appears her body got rid of it without treatment  · We will check this one more time in ~ 6 months  · Keep the ultrasound appointment as scheduled. She still needs monitoring of her liver with blood work and ultrasound every 6 months forever due to cirrhosis

## 2020-08-04 NOTE — TELEPHONE ENCOUNTER
Attempted to contact pt by phone. No answer. VM left. Awaiting call back. PA Scheuermann msg relayed via mail.

## 2020-08-07 ENCOUNTER — TELEPHONE (OUTPATIENT)
Dept: HEPATOLOGY | Facility: CLINIC | Age: 55
End: 2020-08-07

## 2020-08-07 NOTE — TELEPHONE ENCOUNTER
----- Message from Jennifer B. Scheuermann, PA sent at 8/7/2020  7:58 AM CDT -----  Can you call her and apologize. Her liver scarring level is pre cirrhosis. The stage right before cirrhosis. This is what we discussed when she was at her visit. Her liver is working normally. She does require liver monitoring every 6 months with blood work and ultrasound.     If she still has questions or wants me to call her please let me know.    thanks  ----- Message -----  From: Yin Tinajero RN  Sent: 8/6/2020   3:51 PM CDT  To: Jennifer B. Scheuermann, PA    Patient called and your msg from 7/31 relayed.  She had basic questions about cirrhosis and I answered them.  Cirrhosis brochure mailed to her for review.  She states that she still would like you to give her a call.  She has questions about her diagnosis.

## 2020-08-12 ENCOUNTER — HOSPITAL ENCOUNTER (OUTPATIENT)
Dept: RADIOLOGY | Facility: HOSPITAL | Age: 55
Discharge: HOME | End: 2020-08-12
Attending: PHYSICIAN ASSISTANT
Payer: COMMERCIAL

## 2020-08-12 DIAGNOSIS — B19.20 HEPATITIS C VIRUS INFECTION WITHOUT HEPATIC COMA, UNSPECIFIED CHRONICITY: ICD-10-CM

## 2020-08-12 PROCEDURE — 76705 ECHO EXAM OF ABDOMEN: CPT | Mod: TC

## 2020-08-13 ENCOUNTER — TELEPHONE (OUTPATIENT)
Dept: HEPATOLOGY | Facility: CLINIC | Age: 55
End: 2020-08-13

## 2020-08-13 DIAGNOSIS — K74.00 LIVER FIBROSIS: Primary | ICD-10-CM

## 2020-08-13 DIAGNOSIS — Z86.19 HISTORY OF HEPATITIS C: ICD-10-CM

## 2020-08-13 NOTE — TELEPHONE ENCOUNTER
Attempt made to reach patient.  Briefly spoke with her sister.  Msg from provider mailed to patient.  Testing scheduled 2/5/21 locally and f/u visit scheduled 2/12/21.

## 2020-08-13 NOTE — TELEPHONE ENCOUNTER
8/12/20 u/s reviewed - stable    Please notify patient:  Liver cancer screening looked fine. There are no tumors in the liver.   Next liver monitoring is due in 6 months.    Please schedule: CBC, CMP, INR, AFP, U/S, HCV RNA, VISIT in 2/2021  (if possible for labs to be done several days before visit that would be preferable. If not, it's okay)    Thanks

## 2021-02-05 ENCOUNTER — HOSPITAL ENCOUNTER (OUTPATIENT)
Dept: RADIOLOGY | Facility: HOSPITAL | Age: 56
Discharge: HOME | End: 2021-02-05
Attending: PHYSICIAN ASSISTANT
Payer: COMMERCIAL

## 2021-02-05 DIAGNOSIS — Z86.19 HISTORY OF HEPATITIS C: ICD-10-CM

## 2021-02-05 DIAGNOSIS — K74.00 LIVER FIBROSIS: ICD-10-CM

## 2021-02-05 PROCEDURE — 76705 ECHO EXAM OF ABDOMEN: CPT | Mod: TC

## 2021-02-12 ENCOUNTER — OFFICE VISIT (OUTPATIENT)
Dept: HEPATOLOGY | Facility: CLINIC | Age: 56
End: 2021-02-12
Payer: COMMERCIAL

## 2021-02-12 VITALS
HEIGHT: 66 IN | DIASTOLIC BLOOD PRESSURE: 66 MMHG | TEMPERATURE: 98 F | WEIGHT: 190 LBS | RESPIRATION RATE: 16 BRPM | SYSTOLIC BLOOD PRESSURE: 111 MMHG | BODY MASS INDEX: 30.53 KG/M2 | HEART RATE: 74 BPM

## 2021-02-12 DIAGNOSIS — K76.0 FATTY LIVER: ICD-10-CM

## 2021-02-12 DIAGNOSIS — Z86.19 HISTORY OF HEPATITIS C: ICD-10-CM

## 2021-02-12 DIAGNOSIS — K74.02 HEPATIC FIBROSIS, STAGE 3: Primary | ICD-10-CM

## 2021-02-12 DIAGNOSIS — K74.02 ADVANCED HEPATIC FIBROSIS: ICD-10-CM

## 2021-02-12 PROCEDURE — 3008F PR BODY MASS INDEX (BMI) DOCUMENTED: ICD-10-PCS | Mod: CPTII,S$GLB,, | Performed by: PHYSICIAN ASSISTANT

## 2021-02-12 PROCEDURE — 99999 PR PBB SHADOW E&M-EST. PATIENT-LVL IV: CPT | Mod: PBBFAC,,, | Performed by: PHYSICIAN ASSISTANT

## 2021-02-12 PROCEDURE — 99214 OFFICE O/P EST MOD 30 MIN: CPT | Mod: S$GLB,,, | Performed by: PHYSICIAN ASSISTANT

## 2021-02-12 PROCEDURE — 99999 PR PBB SHADOW E&M-EST. PATIENT-LVL IV: ICD-10-PCS | Mod: PBBFAC,,, | Performed by: PHYSICIAN ASSISTANT

## 2021-02-12 PROCEDURE — 1126F AMNT PAIN NOTED NONE PRSNT: CPT | Mod: S$GLB,,, | Performed by: PHYSICIAN ASSISTANT

## 2021-02-12 PROCEDURE — 1126F PR PAIN SEVERITY QUANTIFIED, NO PAIN PRESENT: ICD-10-PCS | Mod: S$GLB,,, | Performed by: PHYSICIAN ASSISTANT

## 2021-02-12 PROCEDURE — 3008F BODY MASS INDEX DOCD: CPT | Mod: CPTII,S$GLB,, | Performed by: PHYSICIAN ASSISTANT

## 2021-02-12 PROCEDURE — 99214 PR OFFICE/OUTPT VISIT, EST, LEVL IV, 30-39 MIN: ICD-10-PCS | Mod: S$GLB,,, | Performed by: PHYSICIAN ASSISTANT

## 2021-08-02 ENCOUNTER — TELEPHONE (OUTPATIENT)
Dept: HEPATOLOGY | Facility: CLINIC | Age: 56
End: 2021-08-02

## 2021-08-25 ENCOUNTER — HOSPITAL ENCOUNTER (OUTPATIENT)
Dept: RADIOLOGY | Facility: HOSPITAL | Age: 56
Discharge: HOME | End: 2021-08-25
Attending: PHYSICIAN ASSISTANT
Payer: COMMERCIAL

## 2021-08-25 ENCOUNTER — OFFICE VISIT (OUTPATIENT)
Dept: HEPATOLOGY | Facility: CLINIC | Age: 56
End: 2021-08-25
Payer: COMMERCIAL

## 2021-08-25 VITALS
TEMPERATURE: 98 F | RESPIRATION RATE: 18 BRPM | WEIGHT: 192 LBS | OXYGEN SATURATION: 97 % | BODY MASS INDEX: 30.86 KG/M2 | DIASTOLIC BLOOD PRESSURE: 72 MMHG | HEIGHT: 66 IN | SYSTOLIC BLOOD PRESSURE: 135 MMHG | HEART RATE: 53 BPM

## 2021-08-25 DIAGNOSIS — K76.9 LIVER LESION: ICD-10-CM

## 2021-08-25 DIAGNOSIS — K74.02 ADVANCED HEPATIC FIBROSIS: Primary | ICD-10-CM

## 2021-08-25 DIAGNOSIS — K74.02 HEPATIC FIBROSIS, STAGE 3: ICD-10-CM

## 2021-08-25 DIAGNOSIS — E11.22 TYPE 2 DIABETES MELLITUS WITH CHRONIC KIDNEY DISEASE, WITHOUT LONG-TERM CURRENT USE OF INSULIN, UNSPECIFIED CKD STAGE: ICD-10-CM

## 2021-08-25 DIAGNOSIS — K76.0 FATTY LIVER DISEASE, NONALCOHOLIC: ICD-10-CM

## 2021-08-25 DIAGNOSIS — Z86.19 HISTORY OF HEPATITIS C: ICD-10-CM

## 2021-08-25 PROCEDURE — 99214 PR OFFICE/OUTPT VISIT, EST, LEVL IV, 30-39 MIN: ICD-10-PCS | Mod: S$GLB,,, | Performed by: NURSE PRACTITIONER

## 2021-08-25 PROCEDURE — 1159F MED LIST DOCD IN RCRD: CPT | Mod: CPTII,S$GLB,, | Performed by: NURSE PRACTITIONER

## 2021-08-25 PROCEDURE — 3078F PR MOST RECENT DIASTOLIC BLOOD PRESSURE < 80 MM HG: ICD-10-PCS | Mod: CPTII,S$GLB,, | Performed by: NURSE PRACTITIONER

## 2021-08-25 PROCEDURE — 1160F RVW MEDS BY RX/DR IN RCRD: CPT | Mod: CPTII,S$GLB,, | Performed by: NURSE PRACTITIONER

## 2021-08-25 PROCEDURE — 99999 PR PBB SHADOW E&M-EST. PATIENT-LVL IV: CPT | Mod: PBBFAC,,, | Performed by: NURSE PRACTITIONER

## 2021-08-25 PROCEDURE — 99999 PR PBB SHADOW E&M-EST. PATIENT-LVL IV: ICD-10-PCS | Mod: PBBFAC,,, | Performed by: NURSE PRACTITIONER

## 2021-08-25 PROCEDURE — 76705 ECHO EXAM OF ABDOMEN: CPT | Mod: 26,,, | Performed by: INTERNAL MEDICINE

## 2021-08-25 PROCEDURE — 99214 OFFICE O/P EST MOD 30 MIN: CPT | Mod: S$GLB,,, | Performed by: NURSE PRACTITIONER

## 2021-08-25 PROCEDURE — 3008F PR BODY MASS INDEX (BMI) DOCUMENTED: ICD-10-PCS | Mod: CPTII,S$GLB,, | Performed by: NURSE PRACTITIONER

## 2021-08-25 PROCEDURE — 76705 US ABDOMEN LIMITED: ICD-10-PCS | Mod: 26,,, | Performed by: INTERNAL MEDICINE

## 2021-08-25 PROCEDURE — 1160F PR REVIEW ALL MEDS BY PRESCRIBER/CLIN PHARMACIST DOCUMENTED: ICD-10-PCS | Mod: CPTII,S$GLB,, | Performed by: NURSE PRACTITIONER

## 2021-08-25 PROCEDURE — 3075F PR MOST RECENT SYSTOLIC BLOOD PRESS GE 130-139MM HG: ICD-10-PCS | Mod: CPTII,S$GLB,, | Performed by: NURSE PRACTITIONER

## 2021-08-25 PROCEDURE — 3078F DIAST BP <80 MM HG: CPT | Mod: CPTII,S$GLB,, | Performed by: NURSE PRACTITIONER

## 2021-08-25 PROCEDURE — 3008F BODY MASS INDEX DOCD: CPT | Mod: CPTII,S$GLB,, | Performed by: NURSE PRACTITIONER

## 2021-08-25 PROCEDURE — 1126F AMNT PAIN NOTED NONE PRSNT: CPT | Mod: CPTII,S$GLB,, | Performed by: NURSE PRACTITIONER

## 2021-08-25 PROCEDURE — 3075F SYST BP GE 130 - 139MM HG: CPT | Mod: CPTII,S$GLB,, | Performed by: NURSE PRACTITIONER

## 2021-08-25 PROCEDURE — 1159F PR MEDICATION LIST DOCUMENTED IN MEDICAL RECORD: ICD-10-PCS | Mod: CPTII,S$GLB,, | Performed by: NURSE PRACTITIONER

## 2021-08-25 PROCEDURE — 1126F PR PAIN SEVERITY QUANTIFIED, NO PAIN PRESENT: ICD-10-PCS | Mod: CPTII,S$GLB,, | Performed by: NURSE PRACTITIONER

## 2021-08-25 PROCEDURE — 76705 ECHO EXAM OF ABDOMEN: CPT | Mod: TC

## 2021-08-25 RX ORDER — TIZANIDINE 2 MG/1
2 TABLET ORAL EVERY 8 HOURS PRN
COMMUNITY
Start: 2021-08-16

## 2021-09-22 ENCOUNTER — HOSPITAL ENCOUNTER (OUTPATIENT)
Dept: RADIOLOGY | Facility: HOSPITAL | Age: 56
Discharge: HOME | End: 2021-09-22
Attending: NURSE PRACTITIONER
Payer: COMMERCIAL

## 2021-09-22 ENCOUNTER — TELEPHONE (OUTPATIENT)
Dept: HEPATOLOGY | Facility: CLINIC | Age: 56
End: 2021-09-22

## 2021-09-22 DIAGNOSIS — K76.0 FATTY LIVER DISEASE, NONALCOHOLIC: ICD-10-CM

## 2021-09-22 DIAGNOSIS — K74.02 ADVANCED HEPATIC FIBROSIS: ICD-10-CM

## 2021-09-22 DIAGNOSIS — K76.9 LIVER LESION: ICD-10-CM

## 2021-09-22 DIAGNOSIS — Z86.19 HISTORY OF HEPATITIS C: ICD-10-CM

## 2021-09-22 PROCEDURE — 74183 MRI ABD W/O CNTR FLWD CNTR: CPT | Mod: TC

## 2021-09-22 PROCEDURE — A9585 GADOBUTROL INJECTION: HCPCS | Performed by: NURSE PRACTITIONER

## 2021-09-22 PROCEDURE — 25500020 PHARM REV CODE 255: Performed by: NURSE PRACTITIONER

## 2021-09-22 RX ORDER — GADOBUTROL 604.72 MG/ML
10 INJECTION INTRAVENOUS
Status: COMPLETED | OUTPATIENT
Start: 2021-09-22 | End: 2021-09-22

## 2021-09-22 RX ADMIN — GADOBUTROL 8 ML: 604.72 INJECTION INTRAVENOUS at 04:09

## 2021-09-29 ENCOUNTER — TELEPHONE (OUTPATIENT)
Dept: HEPATOLOGY | Facility: CLINIC | Age: 56
End: 2021-09-29

## 2021-09-30 ENCOUNTER — TELEPHONE (OUTPATIENT)
Dept: HEPATOLOGY | Facility: CLINIC | Age: 56
End: 2021-09-30

## 2021-09-30 ENCOUNTER — OFFICE VISIT (OUTPATIENT)
Dept: HEPATOLOGY | Facility: CLINIC | Age: 56
End: 2021-09-30
Payer: COMMERCIAL

## 2021-09-30 DIAGNOSIS — E11.22 TYPE 2 DIABETES MELLITUS WITH CHRONIC KIDNEY DISEASE, WITHOUT LONG-TERM CURRENT USE OF INSULIN, UNSPECIFIED CKD STAGE: ICD-10-CM

## 2021-09-30 DIAGNOSIS — Z86.19 HISTORY OF HEPATITIS C: ICD-10-CM

## 2021-09-30 DIAGNOSIS — K75.81 NASH (NONALCOHOLIC STEATOHEPATITIS): ICD-10-CM

## 2021-09-30 DIAGNOSIS — K76.0 FATTY LIVER DISEASE, NONALCOHOLIC: ICD-10-CM

## 2021-09-30 DIAGNOSIS — K74.02 ADVANCED HEPATIC FIBROSIS: Primary | ICD-10-CM

## 2021-09-30 DIAGNOSIS — D18.03 HEMANGIOMA OF LIVER: ICD-10-CM

## 2021-09-30 PROCEDURE — 1160F RVW MEDS BY RX/DR IN RCRD: CPT | Mod: CPTII,95,, | Performed by: NURSE PRACTITIONER

## 2021-09-30 PROCEDURE — 99213 PR OFFICE/OUTPT VISIT, EST, LEVL III, 20-29 MIN: ICD-10-PCS | Mod: 95,,, | Performed by: NURSE PRACTITIONER

## 2021-09-30 PROCEDURE — 99213 OFFICE O/P EST LOW 20 MIN: CPT | Mod: 95,,, | Performed by: NURSE PRACTITIONER

## 2021-09-30 PROCEDURE — 1159F MED LIST DOCD IN RCRD: CPT | Mod: CPTII,95,, | Performed by: NURSE PRACTITIONER

## 2021-09-30 PROCEDURE — 1160F PR REVIEW ALL MEDS BY PRESCRIBER/CLIN PHARMACIST DOCUMENTED: ICD-10-PCS | Mod: CPTII,95,, | Performed by: NURSE PRACTITIONER

## 2021-09-30 PROCEDURE — 1159F PR MEDICATION LIST DOCUMENTED IN MEDICAL RECORD: ICD-10-PCS | Mod: CPTII,95,, | Performed by: NURSE PRACTITIONER
